# Patient Record
Sex: MALE | Race: WHITE | NOT HISPANIC OR LATINO | Employment: OTHER | URBAN - METROPOLITAN AREA
[De-identification: names, ages, dates, MRNs, and addresses within clinical notes are randomized per-mention and may not be internally consistent; named-entity substitution may affect disease eponyms.]

---

## 2023-03-24 DIAGNOSIS — E11.9 TYPE 2 DIABETES MELLITUS WITHOUT COMPLICATION, WITHOUT LONG-TERM CURRENT USE OF INSULIN (MULTI): Primary | ICD-10-CM

## 2023-03-24 RX ORDER — METFORMIN HYDROCHLORIDE 500 MG/1
TABLET, EXTENDED RELEASE ORAL
Qty: 360 TABLET | Refills: 3 | Status: SHIPPED | OUTPATIENT
Start: 2023-03-24 | End: 2023-12-18

## 2023-03-29 DIAGNOSIS — N18.31 STAGE 3A CHRONIC KIDNEY DISEASE (MULTI): Primary | ICD-10-CM

## 2023-04-05 DIAGNOSIS — E11.69 TYPE 2 DIABETES MELLITUS WITH OTHER SPECIFIED COMPLICATION, WITHOUT LONG-TERM CURRENT USE OF INSULIN (MULTI): Primary | ICD-10-CM

## 2023-04-06 RX ORDER — DULAGLUTIDE 0.75 MG/.5ML
INJECTION, SOLUTION SUBCUTANEOUS
Qty: 6 ML | Refills: 0 | Status: SHIPPED | OUTPATIENT
Start: 2023-04-06 | End: 2023-04-14 | Stop reason: ALTCHOICE

## 2023-04-10 DIAGNOSIS — N17.9 AKI (ACUTE KIDNEY INJURY) (CMS-HCC): Primary | ICD-10-CM

## 2023-04-10 DIAGNOSIS — E11.69 TYPE 2 DIABETES MELLITUS WITH OTHER SPECIFIED COMPLICATION, WITHOUT LONG-TERM CURRENT USE OF INSULIN (MULTI): ICD-10-CM

## 2023-04-14 RX ORDER — DULAGLUTIDE 1.5 MG/.5ML
1.5 INJECTION, SOLUTION SUBCUTANEOUS
Qty: 4 EACH | Refills: 2 | Status: SHIPPED | OUTPATIENT
Start: 2023-04-14 | End: 2023-06-26 | Stop reason: ALTCHOICE

## 2023-05-02 DIAGNOSIS — E78.5 DYSLIPIDEMIA: Primary | ICD-10-CM

## 2023-05-02 RX ORDER — PRAVASTATIN SODIUM 40 MG/1
40 TABLET ORAL DAILY
Qty: 90 TABLET | Refills: 0 | Status: SHIPPED | OUTPATIENT
Start: 2023-05-02 | End: 2023-07-26

## 2023-05-02 RX ORDER — LISINOPRIL 40 MG/1
40 TABLET ORAL DAILY
Qty: 90 TABLET | Refills: 0 | Status: SHIPPED | OUTPATIENT
Start: 2023-05-02 | End: 2023-11-06

## 2023-06-06 ENCOUNTER — OFFICE VISIT (OUTPATIENT)
Dept: PRIMARY CARE | Facility: CLINIC | Age: 73
End: 2023-06-06
Payer: MEDICARE

## 2023-06-06 VITALS
SYSTOLIC BLOOD PRESSURE: 164 MMHG | HEART RATE: 59 BPM | WEIGHT: 248 LBS | DIASTOLIC BLOOD PRESSURE: 71 MMHG | BODY MASS INDEX: 36.62 KG/M2

## 2023-06-06 DIAGNOSIS — I10 BENIGN ESSENTIAL HYPERTENSION: ICD-10-CM

## 2023-06-06 DIAGNOSIS — G62.89 OTHER POLYNEUROPATHY: ICD-10-CM

## 2023-06-06 DIAGNOSIS — E66.01 OBESITY, MORBID (MULTI): ICD-10-CM

## 2023-06-06 DIAGNOSIS — E78.1 HYPERTRIGLYCERIDEMIA: ICD-10-CM

## 2023-06-06 DIAGNOSIS — N18.30 CKD STAGE 3 SECONDARY TO DIABETES (MULTI): ICD-10-CM

## 2023-06-06 DIAGNOSIS — J42 CHRONIC BRONCHITIS, UNSPECIFIED CHRONIC BRONCHITIS TYPE (MULTI): Primary | ICD-10-CM

## 2023-06-06 DIAGNOSIS — J44.9 COPD, MILD (MULTI): ICD-10-CM

## 2023-06-06 DIAGNOSIS — E11.22 CKD STAGE 3 SECONDARY TO DIABETES (MULTI): ICD-10-CM

## 2023-06-06 DIAGNOSIS — E11.9 TYPE 2 DIABETES MELLITUS WITHOUT COMPLICATION, WITHOUT LONG-TERM CURRENT USE OF INSULIN (MULTI): ICD-10-CM

## 2023-06-06 PROBLEM — N28.1 RENAL CYST: Status: ACTIVE | Noted: 2023-06-06

## 2023-06-06 PROBLEM — H74.8X3 STIFF MIDDLE EAR TRANSMISSION OF BOTH EARS, TYPE A-S: Status: ACTIVE | Noted: 2023-06-06

## 2023-06-06 PROBLEM — K76.0 FATTY LIVER: Status: ACTIVE | Noted: 2023-06-06

## 2023-06-06 PROBLEM — G62.9 PERIPHERAL NEUROPATHY: Status: ACTIVE | Noted: 2023-06-06

## 2023-06-06 PROBLEM — N40.0 ENLARGED PROSTATE WITHOUT LOWER URINARY TRACT SYMPTOMS (LUTS): Status: ACTIVE | Noted: 2023-06-06

## 2023-06-06 PROBLEM — H40.013 OPEN ANGLE WITH BORDERLINE FINDINGS, LOW RISK, BILATERAL: Status: ACTIVE | Noted: 2023-06-06

## 2023-06-06 PROBLEM — N52.9 ED (ERECTILE DYSFUNCTION): Status: ACTIVE | Noted: 2023-06-06

## 2023-06-06 PROBLEM — E66.9 OBESITY: Status: ACTIVE | Noted: 2023-06-06

## 2023-06-06 PROBLEM — J30.9 ALLERGIC RHINITIS: Status: ACTIVE | Noted: 2023-06-06

## 2023-06-06 PROBLEM — I25.10 CORONARY ARTERY DISEASE: Status: ACTIVE | Noted: 2023-06-06

## 2023-06-06 PROBLEM — K21.9 GERD (GASTROESOPHAGEAL REFLUX DISEASE): Status: ACTIVE | Noted: 2023-06-06

## 2023-06-06 PROBLEM — E78.49 FAMILIAL COMBINED HYPERLIPIDEMIA: Status: ACTIVE | Noted: 2023-06-06

## 2023-06-06 PROBLEM — N35.919 URETHRAL MEATAL STENOSIS: Status: ACTIVE | Noted: 2023-06-06

## 2023-06-06 PROCEDURE — 1160F RVW MEDS BY RX/DR IN RCRD: CPT | Performed by: INTERNAL MEDICINE

## 2023-06-06 PROCEDURE — 3066F NEPHROPATHY DOC TX: CPT | Performed by: INTERNAL MEDICINE

## 2023-06-06 PROCEDURE — 3078F DIAST BP <80 MM HG: CPT | Performed by: INTERNAL MEDICINE

## 2023-06-06 PROCEDURE — 3077F SYST BP >= 140 MM HG: CPT | Performed by: INTERNAL MEDICINE

## 2023-06-06 PROCEDURE — 1159F MED LIST DOCD IN RCRD: CPT | Performed by: INTERNAL MEDICINE

## 2023-06-06 PROCEDURE — 1036F TOBACCO NON-USER: CPT | Performed by: INTERNAL MEDICINE

## 2023-06-06 PROCEDURE — 4010F ACE/ARB THERAPY RXD/TAKEN: CPT | Performed by: INTERNAL MEDICINE

## 2023-06-06 PROCEDURE — 99214 OFFICE O/P EST MOD 30 MIN: CPT | Performed by: INTERNAL MEDICINE

## 2023-06-06 RX ORDER — EZETIMIBE 10 MG/1
1 TABLET ORAL DAILY
COMMUNITY
Start: 2019-11-07 | End: 2023-07-26

## 2023-06-06 RX ORDER — MULTIVITAMIN/IRON/FOLIC ACID 18MG-0.4MG
1 TABLET ORAL DAILY
COMMUNITY

## 2023-06-06 RX ORDER — ALBUTEROL SULFATE 90 UG/1
1 AEROSOL, METERED RESPIRATORY (INHALATION) EVERY 4 HOURS PRN
COMMUNITY
Start: 2021-09-13 | End: 2023-08-23 | Stop reason: ALTCHOICE

## 2023-06-06 RX ORDER — AMLODIPINE BESYLATE 2.5 MG/1
1 TABLET ORAL DAILY
COMMUNITY
Start: 2022-07-20 | End: 2023-08-23 | Stop reason: ALTCHOICE

## 2023-06-06 RX ORDER — ASPIRIN 81 MG/1
1 TABLET ORAL DAILY
COMMUNITY
Start: 2019-05-03 | End: 2023-10-06 | Stop reason: ALTCHOICE

## 2023-06-06 RX ORDER — DULOXETIN HYDROCHLORIDE 60 MG/1
60 CAPSULE, DELAYED RELEASE ORAL
COMMUNITY
End: 2024-01-12

## 2023-06-06 RX ORDER — GLIMEPIRIDE 2 MG/1
1 TABLET ORAL 2 TIMES DAILY
COMMUNITY
Start: 2016-11-15 | End: 2023-07-26

## 2023-06-06 RX ORDER — TADALAFIL 5 MG/1
5 TABLET ORAL
COMMUNITY
End: 2023-08-23 | Stop reason: ALTCHOICE

## 2023-06-06 RX ORDER — FENOFIBRATE 160 MG/1
1 TABLET ORAL DAILY
COMMUNITY
Start: 2020-03-24 | End: 2023-12-04 | Stop reason: SDUPTHER

## 2023-06-06 RX ORDER — FLUTICASONE PROPIONATE 50 MCG
1 SPRAY, SUSPENSION (ML) NASAL DAILY
COMMUNITY
Start: 2021-09-13 | End: 2024-01-08 | Stop reason: WASHOUT

## 2023-06-06 RX ORDER — ATENOLOL 50 MG/1
1 TABLET ORAL DAILY
COMMUNITY
Start: 2017-08-09 | End: 2023-08-29

## 2023-06-06 RX ORDER — FLUTICASONE FUROATE AND VILANTEROL TRIFENATATE 100; 25 UG/1; UG/1
1 POWDER RESPIRATORY (INHALATION) DAILY
COMMUNITY
Start: 2022-02-04 | End: 2023-09-27

## 2023-06-06 NOTE — ASSESSMENT & PLAN NOTE
Possibly related to the diabetes however will rule out carpal tunnel syndrome with EMG.  We will call with results of testing

## 2023-06-06 NOTE — ASSESSMENT & PLAN NOTE
Previously well controlled.  Check A1c.  Adjust medications pending results.  Advised weight reduction and low carbohydrate diet.  Follow-up 4 months for reevaluation

## 2023-06-06 NOTE — ASSESSMENT & PLAN NOTE
Elevated today but previously well controlled.  We will recheck next visit.  Advised low-salt diet and regular exercise as well as weight reduction

## 2023-06-06 NOTE — PROGRESS NOTES
Subjective   Patient ID: Derrek Messina is a 73 y.o. male who presents for Follow-up.    HPI     Patient is a 72-year-old male with past medical history of obstructive sleep apnea diabetes mellitus type 2 hypertension dyslipidemia and asymptomatic coronary artery disease who presents for follow up    Patient with obstructive sleep apnea following with ENT.    Diabetes mellitus type 2. A1c better controlled. He is compliant with medications and takes his metformin and Trulicity as well as glimepiride. He denies increased thirst or increased urination.   No numbness or tingling of the extremities. A1c last < 6.5    Hypertension currently elevated.  His previous blood pressures have been better controlled.  He states has been taking his medications as prescribed.  No headaches changes in vision orthopnea    Dyslipidemia with elevated triglycerides. Due for LDL recheck and reevaluation of the triglyceride levels.    Asym CAD. Following with cards.     COPD. Not smoking. Occasional SOB on exertion but overall stable. No recent exacerbation He denies any wheezing. Most recent PFTs showing mild to moderate obstruction patient is currently taking Breo and albuterol.     CKD 3 with proteinuria. Pt on lisinopril 40mg.     B/L renal cysts with hx of nephrolithiasis. Following with nephro.  Recent ultrasound of the kidneys shows stability of the cysts    Patient is complaining of bilateral numbness and tingling of the fingers.  He states he was previously diagnosed with diabetic neuropathy.  It is not affecting his feet.  He is never had an EMG    Review of Systems  Constitutional: No fever or chills  Cardiovascular: no chest pain, no palpitations and no syncope.   Respiratory: no cough, no shortness of breath during exertion and no shortness of breath at rest.   Gastrointestinal: no abdominal pain, no nausea and no vomiting.  Neuro: No Headache, no dizziness    Objective   /71   Pulse 59   Wt 112 kg (248 lb)   BMI  36.62 kg/m²     Physical Exam  Constitutional: Alert and in no acute distress. Well developed, well nourished  Head and Face: Head and face: Normal.    Cardiovascular: Heart rate and rhythm were normal, normal S1 and S2. No peripheral edema.   Pulmonary: No respiratory distress. Clear bilateral breath sounds.  Musculoskeletal: Gait and station: Normal. Muscle strength/tone: Normal.   Skin: Normal skin color and pigmentation, normal skin turgor, and no rash.    Psychiatric: Judgment and insight: Intact. Mood and affect: Normal.        Lab Results   Component Value Date    WBC 7.1 04/19/2022    HGB 13.1 (L) 04/19/2022    HCT 42.1 04/19/2022     04/19/2022    CHOL 153 04/19/2022    TRIG 404 (H) 04/19/2022    HDL 39.4 (A) 04/19/2022    LDLDIRECT 44 09/08/2020    ALT 24 04/19/2022    AST 21 04/19/2022     04/19/2022    K 4.9 04/19/2022     04/19/2022    CREATININE 1.28 04/19/2022    BUN 19 04/19/2022    CO2 25 04/19/2022    TSH 1.72 06/10/2021    PSA 0.46 05/03/2019    HGBA1C 6.5 (A) 09/06/2022       US renal complete  Narrative: Interpreted By:  ENMA QUINONEZ MD  MRN: [id number]  Patient Name: JD SINHA     STUDY:  US RENAL BILAT;  5/1/2023 1:33 pm     INDICATION:  RHODA.     COMPARISON:  October 30, 2015 CT scan abdomen and pelvis, December 28, 2021 renal  ultrasound     ACCESSION NUMBER(S):  [id number]     ORDERING CLINICIAN:  BEVERLEY BOWLING     TECHNIQUE:  Multiple images of the kidneys were obtained  .     FINDINGS:  RIGHT KIDNEY:  The right kidney measures 11.3 cm in length. The renal cortical  echogenicity and thickness are within normal limits. No evident  hydronephrosis. Redemonstrated cysts measuring up to 7 cm mid  inferior aspect. Hyperechoic foci mid inferior aspect measuring 10 mm  and adjacent mid 6 mm hyperechoic focus potentially nonobstructing  renal calculi.     LEFT KIDNEY:  The left kidney measures 12 cm in length. The renal cortical  echogenicity and thickness are within normal  limits. Again seen is a  cortical cyst measuring up to 5 cm compared with 4 cm. No evident  calculi, or hydronephrosis.     Redemonstrated hyperechoic liver most likely fatty infiltration,  poorly evaluated.     BLADDER:  Not assessed     Impression: Bilateral cortical cyst appears similar.     Potential nonobstructing calculi right kidney.     No separate acute detected abnormality.     Hyperechoic liver favoring fatty infiltration.            Assessment/Plan   Problem List Items Addressed This Visit          Nervous    Peripheral neuropathy     Possibly related to the diabetes however will rule out carpal tunnel syndrome with EMG.  We will call with results of testing         Relevant Orders    EMG & nerve conduction       Respiratory    Chronic bronchitis, unspecified chronic bronchitis type (CMS/HCC) - Primary     Continue Breo daily and continue albuterol as needed.  No recent exacerbations         Relevant Orders    Renal function panel    Urinalysis with Reflex Microscopic    Lipid Panel    Hemoglobin A1C    Albumin , Urine Random    CBC    COPD, mild (CMS/HCC)       Circulatory    Benign essential hypertension     Elevated today but previously well controlled.  We will recheck next visit.  Advised low-salt diet and regular exercise as well as weight reduction            Endocrine/Metabolic    CKD stage 3 secondary to diabetes (CMS/HCC)     We will continue to monitor.  Check renal function as well as urine albumin.  Patient does not wish to start an SGLT2.  Advise follow-up with nephrology         Relevant Orders    Renal function panel    Urinalysis with Reflex Microscopic    Lipid Panel    Hemoglobin A1C    Albumin , Urine Random    CBC    Obesity    Diabetes mellitus (CMS/HCC)     Previously well controlled.  Check A1c.  Adjust medications pending results.  Advised weight reduction and low carbohydrate diet.  Follow-up 4 months for reevaluation         Relevant Orders    Renal function panel    Urinalysis  with Reflex Microscopic    Lipid Panel    Hemoglobin A1C    Albumin , Urine Random    CBC       Other    Hypertriglyceridemia     Needs better control the setting of asymptomatic coronary artery disease.  Patient follows with cardiology.  Continue omega-3's.  Continue fenofibrate and Zetia.  Continue statin.  Advise low cholesterol diet

## 2023-06-06 NOTE — ASSESSMENT & PLAN NOTE
We will continue to monitor.  Check renal function as well as urine albumin.  Patient does not wish to start an SGLT2.  Advise follow-up with nephrology

## 2023-06-06 NOTE — ASSESSMENT & PLAN NOTE
Needs better control the setting of asymptomatic coronary artery disease.  Patient follows with cardiology.  Continue omega-3's.  Continue fenofibrate and Zetia.  Continue statin.  Advise low cholesterol diet

## 2023-06-15 DIAGNOSIS — G62.89 OTHER POLYNEUROPATHY: ICD-10-CM

## 2023-06-16 ENCOUNTER — TELEPHONE (OUTPATIENT)
Dept: PRIMARY CARE | Facility: CLINIC | Age: 73
End: 2023-06-16

## 2023-06-20 LAB
ALBUMIN (G/DL) IN SER/PLAS: 4.3 G/DL (ref 3.4–5)
ALBUMIN (MG/L) IN URINE: 535.8 MG/L
ALBUMIN/CREATININE (UG/MG) IN URINE: 461.9 UG/MG CRT (ref 0–30)
ANION GAP IN SER/PLAS: 16 MMOL/L (ref 10–20)
APPEARANCE, URINE: CLEAR
BACTERIA, URINE: ABNORMAL /HPF
BILIRUBIN, URINE: NEGATIVE
BLOOD, URINE: NEGATIVE
CALCIUM (MG/DL) IN SER/PLAS: 9.7 MG/DL (ref 8.6–10.6)
CARBON DIOXIDE, TOTAL (MMOL/L) IN SER/PLAS: 24 MMOL/L (ref 21–32)
CHLORIDE (MMOL/L) IN SER/PLAS: 104 MMOL/L (ref 98–107)
CHOLESTEROL (MG/DL) IN SER/PLAS: 135 MG/DL (ref 0–199)
CHOLESTEROL IN HDL (MG/DL) IN SER/PLAS: 35.1 MG/DL
CHOLESTEROL/HDL RATIO: 3.8
COLOR, URINE: YELLOW
CREATININE (MG/DL) IN SER/PLAS: 2.1 MG/DL (ref 0.5–1.3)
CREATININE (MG/DL) IN URINE: 116 MG/DL (ref 20–370)
ERYTHROCYTE DISTRIBUTION WIDTH (RATIO) BY AUTOMATED COUNT: 14.1 % (ref 11.5–14.5)
ERYTHROCYTE MEAN CORPUSCULAR HEMOGLOBIN CONCENTRATION (G/DL) BY AUTOMATED: 31.5 G/DL (ref 32–36)
ERYTHROCYTE MEAN CORPUSCULAR VOLUME (FL) BY AUTOMATED COUNT: 93 FL (ref 80–100)
ERYTHROCYTES (10*6/UL) IN BLOOD BY AUTOMATED COUNT: 5.21 X10E12/L (ref 4.5–5.9)
ESTIMATED AVERAGE GLUCOSE FOR HBA1C: 192 MG/DL
GFR MALE: 33 ML/MIN/1.73M2
GLUCOSE (MG/DL) IN SER/PLAS: 125 MG/DL (ref 74–99)
GLUCOSE, URINE: NEGATIVE MG/DL
HEMATOCRIT (%) IN BLOOD BY AUTOMATED COUNT: 48.2 % (ref 41–52)
HEMOGLOBIN (G/DL) IN BLOOD: 15.2 G/DL (ref 13.5–17.5)
HEMOGLOBIN A1C/HEMOGLOBIN TOTAL IN BLOOD: 8.3 %
KETONES, URINE: NEGATIVE MG/DL
LDL: ABNORMAL MG/DL (ref 0–99)
LEUKOCYTE ESTERASE, URINE: NEGATIVE
LEUKOCYTES (10*3/UL) IN BLOOD BY AUTOMATED COUNT: 7.6 X10E9/L (ref 4.4–11.3)
NITRITE, URINE: NEGATIVE
NRBC (PER 100 WBCS) BY AUTOMATED COUNT: 0 /100 WBC (ref 0–0)
PH, URINE: 5 (ref 5–8)
PHOSPHATE (MG/DL) IN SER/PLAS: 3.5 MG/DL (ref 2.5–4.9)
PLATELETS (10*3/UL) IN BLOOD AUTOMATED COUNT: 203 X10E9/L (ref 150–450)
POTASSIUM (MMOL/L) IN SER/PLAS: 5.7 MMOL/L (ref 3.5–5.3)
PROTEIN, URINE: ABNORMAL MG/DL
RBC, URINE: 3 /HPF (ref 0–5)
SODIUM (MMOL/L) IN SER/PLAS: 138 MMOL/L (ref 136–145)
SPECIFIC GRAVITY, URINE: 1.01 (ref 1–1.03)
SQUAMOUS EPITHELIAL CELLS, URINE: <1 /HPF
TRIGLYCERIDE (MG/DL) IN SER/PLAS: 532 MG/DL (ref 0–149)
UREA NITROGEN (MG/DL) IN SER/PLAS: 29 MG/DL (ref 6–23)
UROBILINOGEN, URINE: <2 MG/DL (ref 0–1.9)
VLDL: ABNORMAL MG/DL (ref 0–40)
WBC, URINE: 3 /HPF (ref 0–5)

## 2023-06-20 PROCEDURE — 80061 LIPID PANEL: CPT

## 2023-06-20 PROCEDURE — 85027 COMPLETE CBC AUTOMATED: CPT

## 2023-06-20 PROCEDURE — 81001 URINALYSIS AUTO W/SCOPE: CPT

## 2023-06-20 PROCEDURE — 82570 ASSAY OF URINE CREATININE: CPT

## 2023-06-20 PROCEDURE — 82043 UR ALBUMIN QUANTITATIVE: CPT

## 2023-06-20 PROCEDURE — 80069 RENAL FUNCTION PANEL: CPT

## 2023-06-20 PROCEDURE — 83036 HEMOGLOBIN GLYCOSYLATED A1C: CPT

## 2023-06-26 DIAGNOSIS — E11.69 TYPE 2 DIABETES MELLITUS WITH OTHER SPECIFIED COMPLICATION, WITHOUT LONG-TERM CURRENT USE OF INSULIN (MULTI): ICD-10-CM

## 2023-06-26 DIAGNOSIS — E11.9 TYPE 2 DIABETES MELLITUS WITHOUT COMPLICATION, WITHOUT LONG-TERM CURRENT USE OF INSULIN (MULTI): ICD-10-CM

## 2023-07-06 ENCOUNTER — TELEPHONE (OUTPATIENT)
Dept: PRIMARY CARE | Facility: CLINIC | Age: 73
End: 2023-07-06
Payer: MEDICARE

## 2023-07-06 DIAGNOSIS — E11.9 TYPE 2 DIABETES MELLITUS WITHOUT COMPLICATION, WITHOUT LONG-TERM CURRENT USE OF INSULIN (MULTI): Primary | ICD-10-CM

## 2023-07-08 DIAGNOSIS — E78.5 DYSLIPIDEMIA: ICD-10-CM

## 2023-07-08 DIAGNOSIS — E87.5 HYPERKALEMIA: Primary | ICD-10-CM

## 2023-07-10 RX ORDER — LISINOPRIL 40 MG/1
40 TABLET ORAL DAILY
Qty: 90 TABLET | Refills: 0 | OUTPATIENT
Start: 2023-07-10

## 2023-07-26 DIAGNOSIS — E78.5 DYSLIPIDEMIA: ICD-10-CM

## 2023-07-26 DIAGNOSIS — E11.9 TYPE 2 DIABETES MELLITUS WITHOUT COMPLICATION, WITHOUT LONG-TERM CURRENT USE OF INSULIN (MULTI): Primary | ICD-10-CM

## 2023-07-26 RX ORDER — PRAVASTATIN SODIUM 40 MG/1
40 TABLET ORAL DAILY
Qty: 90 TABLET | Refills: 3 | Status: SHIPPED | OUTPATIENT
Start: 2023-07-26

## 2023-07-26 RX ORDER — GLIMEPIRIDE 2 MG/1
2 TABLET ORAL 2 TIMES DAILY
Qty: 200 TABLET | Refills: 2 | Status: SHIPPED | OUTPATIENT
Start: 2023-07-26

## 2023-07-26 RX ORDER — EZETIMIBE 10 MG/1
10 TABLET ORAL DAILY
Qty: 90 TABLET | Refills: 3 | Status: SHIPPED | OUTPATIENT
Start: 2023-07-26 | End: 2024-04-17

## 2023-08-22 ENCOUNTER — CLINICAL SUPPORT (OUTPATIENT)
Dept: PRIMARY CARE | Facility: CLINIC | Age: 73
End: 2023-08-22
Payer: MEDICARE

## 2023-08-22 DIAGNOSIS — E78.5 DYSLIPIDEMIA: ICD-10-CM

## 2023-08-22 DIAGNOSIS — E87.5 HYPERKALEMIA: ICD-10-CM

## 2023-08-22 DIAGNOSIS — E11.22 CKD STAGE 3 SECONDARY TO DIABETES (MULTI): Primary | ICD-10-CM

## 2023-08-22 DIAGNOSIS — N18.30 CKD STAGE 3 SECONDARY TO DIABETES (MULTI): Primary | ICD-10-CM

## 2023-08-22 LAB
ALBUMIN (G/DL) IN SER/PLAS: 4.2 G/DL (ref 3.4–5)
ANION GAP IN SER/PLAS: 17 MMOL/L (ref 10–20)
CALCIUM (MG/DL) IN SER/PLAS: 9.7 MG/DL (ref 8.6–10.6)
CARBON DIOXIDE, TOTAL (MMOL/L) IN SER/PLAS: 21 MMOL/L (ref 21–32)
CHLORIDE (MMOL/L) IN SER/PLAS: 107 MMOL/L (ref 98–107)
CREATININE (MG/DL) IN SER/PLAS: 2.16 MG/DL (ref 0.5–1.3)
GFR MALE: 31 ML/MIN/1.73M2
GLUCOSE (MG/DL) IN SER/PLAS: 212 MG/DL (ref 74–99)
PHOSPHATE (MG/DL) IN SER/PLAS: 2.9 MG/DL (ref 2.5–4.9)
POTASSIUM (MMOL/L) IN SER/PLAS: 5.8 MMOL/L (ref 3.5–5.3)
SODIUM (MMOL/L) IN SER/PLAS: 139 MMOL/L (ref 136–145)
UREA NITROGEN (MG/DL) IN SER/PLAS: 37 MG/DL (ref 6–23)

## 2023-08-22 PROCEDURE — 80069 RENAL FUNCTION PANEL: CPT

## 2023-08-26 PROBLEM — R11.10 REGURGITATION OF FOOD: Status: ACTIVE | Noted: 2023-08-26

## 2023-08-26 PROBLEM — R06.09 DYSPNEA ON EXERTION: Status: ACTIVE | Noted: 2023-08-26

## 2023-08-26 PROBLEM — Z86.79 HISTORY OF HYPERTENSION: Status: ACTIVE | Noted: 2023-08-26

## 2023-08-26 PROBLEM — H25.813 COMBINED FORM OF SENILE CATARACT OF BOTH EYES: Status: ACTIVE | Noted: 2023-08-26

## 2023-08-26 PROBLEM — R20.2 PARESTHESIA: Status: ACTIVE | Noted: 2023-08-26

## 2023-08-26 PROBLEM — Z86.2 HISTORY OF IMMUNE DISORDER: Status: ACTIVE | Noted: 2023-08-26

## 2023-08-26 PROBLEM — L30.4 INTERTRIGO: Status: ACTIVE | Noted: 2023-08-26

## 2023-08-26 PROBLEM — R19.8 ALTERED BOWEL FUNCTION: Status: ACTIVE | Noted: 2023-08-26

## 2023-08-26 PROBLEM — R68.82 REDUCED LIBIDO: Status: ACTIVE | Noted: 2023-08-26

## 2023-08-26 PROBLEM — R53.81 PHYSICAL DECONDITIONING: Status: ACTIVE | Noted: 2023-08-26

## 2023-08-26 PROBLEM — M54.9 BACK PAIN: Status: ACTIVE | Noted: 2023-08-26

## 2023-08-26 PROBLEM — E11.9 ABNORMAL METABOLIC STATE DUE TO DIABETES MELLITUS (MULTI): Status: ACTIVE | Noted: 2023-08-26

## 2023-08-26 PROBLEM — H40.1390 PIGMENTARY GLAUCOMA: Status: ACTIVE | Noted: 2023-08-26

## 2023-08-26 PROBLEM — H90.3 SENSORINEURAL HEARING LOSS, BILATERAL: Status: ACTIVE | Noted: 2017-08-18

## 2023-08-26 PROBLEM — N17.9 ACUTE KIDNEY FAILURE (CMS-HCC): Status: ACTIVE | Noted: 2023-05-01

## 2023-08-26 PROBLEM — L73.9 FOLLICULITIS: Status: ACTIVE | Noted: 2023-08-26

## 2023-08-26 PROBLEM — L98.9 SKIN LESION: Status: ACTIVE | Noted: 2023-08-26

## 2023-08-26 PROBLEM — R13.10 DYSPHAGIA: Status: ACTIVE | Noted: 2023-08-26

## 2023-08-26 PROBLEM — R05.9 COUGH: Status: ACTIVE | Noted: 2023-08-26

## 2023-08-26 PROBLEM — H52.7 UNSPECIFIED DISORDER OF REFRACTION: Status: ACTIVE | Noted: 2023-08-26

## 2023-08-26 PROBLEM — H93.13 TINNITUS OF BOTH EARS: Status: ACTIVE | Noted: 2017-09-17

## 2023-08-26 PROBLEM — L29.9 PRURITUS: Status: ACTIVE | Noted: 2023-08-26

## 2023-08-26 PROBLEM — M43.10 SPONDYLOLISTHESIS: Status: ACTIVE | Noted: 2023-08-26

## 2023-08-29 DIAGNOSIS — I10 BENIGN ESSENTIAL HYPERTENSION: ICD-10-CM

## 2023-08-29 RX ORDER — ATENOLOL 50 MG/1
50 TABLET ORAL DAILY
Qty: 100 TABLET | Refills: 0 | Status: SHIPPED | OUTPATIENT
Start: 2023-08-29 | End: 2023-12-04

## 2023-09-26 DIAGNOSIS — J44.9 COPD, MILD (MULTI): ICD-10-CM

## 2023-09-27 ENCOUNTER — PATIENT MESSAGE (OUTPATIENT)
Dept: PRIMARY CARE | Facility: CLINIC | Age: 73
End: 2023-09-27
Payer: MEDICARE

## 2023-09-27 DIAGNOSIS — J44.9 COPD, MILD (MULTI): Primary | ICD-10-CM

## 2023-09-27 RX ORDER — FLUTICASONE FUROATE AND VILANTEROL TRIFENATATE 100; 25 UG/1; UG/1
POWDER RESPIRATORY (INHALATION)
Qty: 180 EACH | Refills: 0 | Status: SHIPPED | OUTPATIENT
Start: 2023-09-27 | End: 2023-10-06 | Stop reason: SDUPTHER

## 2023-10-05 ENCOUNTER — OFFICE VISIT (OUTPATIENT)
Dept: PRIMARY CARE | Facility: CLINIC | Age: 73
End: 2023-10-05
Payer: MEDICARE

## 2023-10-05 VITALS
DIASTOLIC BLOOD PRESSURE: 68 MMHG | BODY MASS INDEX: 36.48 KG/M2 | SYSTOLIC BLOOD PRESSURE: 122 MMHG | HEART RATE: 73 BPM | WEIGHT: 247 LBS

## 2023-10-05 DIAGNOSIS — Z23 NEEDS FLU SHOT: ICD-10-CM

## 2023-10-05 DIAGNOSIS — Z13.6 ENCOUNTER FOR ABDOMINAL AORTIC ANEURYSM (AAA) SCREENING: ICD-10-CM

## 2023-10-05 DIAGNOSIS — E11.22 CKD STAGE 3 SECONDARY TO DIABETES (MULTI): ICD-10-CM

## 2023-10-05 DIAGNOSIS — R05.3 CHRONIC COUGH: ICD-10-CM

## 2023-10-05 DIAGNOSIS — E11.9 TYPE 2 DIABETES MELLITUS WITHOUT COMPLICATION, WITHOUT LONG-TERM CURRENT USE OF INSULIN (MULTI): ICD-10-CM

## 2023-10-05 DIAGNOSIS — N18.4 CHRONIC RENAL DISEASE, STAGE IV (MULTI): Primary | ICD-10-CM

## 2023-10-05 DIAGNOSIS — I10 BENIGN ESSENTIAL HYPERTENSION: ICD-10-CM

## 2023-10-05 DIAGNOSIS — R93.1 AGATSTON CORONARY ARTERY CALCIUM SCORE GREATER THAN 400: ICD-10-CM

## 2023-10-05 DIAGNOSIS — N18.30 CKD STAGE 3 SECONDARY TO DIABETES (MULTI): ICD-10-CM

## 2023-10-05 PROBLEM — J30.9 ALLERGIC RHINITIS: Status: RESOLVED | Noted: 2023-06-06 | Resolved: 2023-10-05

## 2023-10-05 PROBLEM — H74.8X3 STIFF MIDDLE EAR TRANSMISSION OF BOTH EARS, TYPE A-S: Status: RESOLVED | Noted: 2023-06-06 | Resolved: 2023-10-05

## 2023-10-05 PROBLEM — E78.1 HYPERTRIGLYCERIDEMIA: Status: RESOLVED | Noted: 2023-06-06 | Resolved: 2023-10-05

## 2023-10-05 PROBLEM — Z86.79 HISTORY OF HYPERTENSION: Status: RESOLVED | Noted: 2023-08-26 | Resolved: 2023-10-05

## 2023-10-05 PROBLEM — R06.02 EXERTIONAL SHORTNESS OF BREATH: Status: ACTIVE | Noted: 2023-08-26

## 2023-10-05 PROBLEM — N20.0 KIDNEY STONE: Status: ACTIVE | Noted: 2023-06-06

## 2023-10-05 PROBLEM — R19.4 CHANGE IN BOWEL HABITS: Status: ACTIVE | Noted: 2023-08-26

## 2023-10-05 LAB
ALBUMIN SERPL BCP-MCNC: 4.3 G/DL (ref 3.4–5)
ANION GAP SERPL CALC-SCNC: 16 MMOL/L (ref 10–20)
BUN SERPL-MCNC: 37 MG/DL (ref 6–23)
CALCIUM SERPL-MCNC: 9.7 MG/DL (ref 8.6–10.6)
CHLORIDE SERPL-SCNC: 109 MMOL/L (ref 98–107)
CO2 SERPL-SCNC: 21 MMOL/L (ref 21–32)
CREAT SERPL-MCNC: 2.09 MG/DL (ref 0.5–1.3)
EST. AVERAGE GLUCOSE BLD GHB EST-MCNC: 183 MG/DL
GFR SERPL CREATININE-BSD FRML MDRD: 33 ML/MIN/1.73M*2
GLUCOSE SERPL-MCNC: 197 MG/DL (ref 74–99)
HBA1C MFR BLD: 8 %
PHOSPHATE SERPL-MCNC: 3 MG/DL (ref 2.5–4.9)
POTASSIUM SERPL-SCNC: 5.8 MMOL/L (ref 3.5–5.3)
SODIUM SERPL-SCNC: 140 MMOL/L (ref 136–145)

## 2023-10-05 PROCEDURE — 83036 HEMOGLOBIN GLYCOSYLATED A1C: CPT

## 2023-10-05 PROCEDURE — 4010F ACE/ARB THERAPY RXD/TAKEN: CPT | Performed by: INTERNAL MEDICINE

## 2023-10-05 PROCEDURE — 80069 RENAL FUNCTION PANEL: CPT

## 2023-10-05 PROCEDURE — 1159F MED LIST DOCD IN RCRD: CPT | Performed by: INTERNAL MEDICINE

## 2023-10-05 PROCEDURE — 3078F DIAST BP <80 MM HG: CPT | Performed by: INTERNAL MEDICINE

## 2023-10-05 PROCEDURE — G0008 ADMIN INFLUENZA VIRUS VAC: HCPCS | Performed by: INTERNAL MEDICINE

## 2023-10-05 PROCEDURE — 99214 OFFICE O/P EST MOD 30 MIN: CPT | Performed by: INTERNAL MEDICINE

## 2023-10-05 PROCEDURE — 90662 IIV NO PRSV INCREASED AG IM: CPT | Performed by: INTERNAL MEDICINE

## 2023-10-05 PROCEDURE — 3066F NEPHROPATHY DOC TX: CPT | Performed by: INTERNAL MEDICINE

## 2023-10-05 PROCEDURE — 1160F RVW MEDS BY RX/DR IN RCRD: CPT | Performed by: INTERNAL MEDICINE

## 2023-10-05 PROCEDURE — 36415 COLL VENOUS BLD VENIPUNCTURE: CPT

## 2023-10-05 PROCEDURE — 3052F HG A1C>EQUAL 8.0%<EQUAL 9.0%: CPT | Performed by: INTERNAL MEDICINE

## 2023-10-05 PROCEDURE — 1036F TOBACCO NON-USER: CPT | Performed by: INTERNAL MEDICINE

## 2023-10-05 PROCEDURE — 3074F SYST BP LT 130 MM HG: CPT | Performed by: INTERNAL MEDICINE

## 2023-10-05 RX ORDER — MONTELUKAST SODIUM 10 MG/1
10 TABLET ORAL NIGHTLY
Qty: 30 TABLET | Refills: 0 | Status: SHIPPED | OUTPATIENT
Start: 2023-10-05 | End: 2023-11-06

## 2023-10-05 NOTE — ASSESSMENT & PLAN NOTE
Avoid ibuprofen.  Would benefit from medication such as an SGLT2 however there appears to be a cost issue related to the medication and he has Medicare gap.  Will refer to the pharmacist to discuss whether this medication can be affordable.  In the meantime continue ACE inhibitor 40 mg.

## 2023-10-05 NOTE — PROGRESS NOTES
Subjective   Patient ID: Derrek Messina is a 73 y.o. male who presents for Follow-up.    HPI     Patient is a 72-year-old male with past medical history of obstructive sleep apnea diabetes mellitus type 2 hypertension dyslipidemia and asymptomatic coronary artery disease who presents for follow up    Patient with obstructive sleep apnea following with ENT.    Diabetes mellitus type 2. [id number] last visit . He is compliant with medications and takes his metformin and Trulicity as well as glimepiride. He denies increased thirst or increased urination.   No numbness or tingling of the extremities.     Hypertension.  Currently well controlled on current medications denies headache changes in vision orthopnea.  Reports compliance with his medications.    Dyslipidemia with elevated triglycerides. Due for LDL recheck and reevaluation of the triglyceride levels.    Asym CAD. Following with cards.     COPD. Not smoking. Occasional SOB on exertion but overall stable. No recent exacerbation He denies any wheezing. Most recent PFTs showing mild to moderate obstruction patient is currently taking Breo and albuterol.     CKD 3 with proteinuria. Pt on lisinopril 40mg.     B/L renal cysts with hx of nephrolithiasis. Following with nephro.  Recent ultrasound of the kidneys shows stability of the cysts      Review of Systems  Constitutional: No fever or chills  Cardiovascular: no chest pain, no palpitations and no syncope.   Respiratory: no cough, no shortness of breath during exertion and no shortness of breath at rest.   Gastrointestinal: no abdominal pain, no nausea and no vomiting.  Neuro: No Headache, no dizziness    Objective   /68   Pulse 73   Wt 112 kg (247 lb)   BMI 36.48 kg/m²     Physical Exam  Constitutional: Alert and in no acute distress. Well developed, well nourished  Head and Face: Head and face: Normal.    Cardiovascular: Heart rate and rhythm were normal, normal S1 and S2. No peripheral edema.    Pulmonary: No respiratory distress. Clear bilateral breath sounds.  Musculoskeletal: Gait and station: Normal. Muscle strength/tone: Normal.   Skin: Normal skin color and pigmentation, normal skin turgor, and no rash.    Psychiatric: Judgment and insight: Intact. Mood and affect: Normal.        Lab Results   Component Value Date    WBC 7.6 06/20/2023    HGB 15.2 06/20/2023    HCT 48.2 06/20/2023     06/20/2023    CHOL 135 06/20/2023    TRIG 532 (H) 06/20/2023    HDL 35.1 (A) 06/20/2023    LDLDIRECT 47 (L) 02/25/2023    ALT 21 02/25/2023    AST 20 02/25/2023     08/22/2023    K 5.8 (H) 08/22/2023     08/22/2023    CREATININE 2.16 (H) 08/22/2023    BUN 37 (H) 08/22/2023    CO2 21 08/22/2023    TSH 1.72 06/10/2021    PSA 0.7 02/25/2023    HGBA1C 8.3 (A) 06/20/2023    ALBUR 259 (H) 02/25/2023       US renal complete  Narrative: Interpreted By:  ENMA QUINONEZ MD  MRN: [id number]  Patient Name: JD SINHA     STUDY:  US RENAL BILAT;  5/1/2023 1:33 pm     INDICATION:  RHODA.     COMPARISON:  October 30, 2015 CT scan abdomen and pelvis, December 28, 2021 renal  ultrasound     ACCESSION NUMBER(S):  [id number]     ORDERING CLINICIAN:  BEVERLEY BOWLING     TECHNIQUE:  Multiple images of the kidneys were obtained  .     FINDINGS:  RIGHT KIDNEY:  The right kidney measures 11.3 cm in length. The renal cortical  echogenicity and thickness are within normal limits. No evident  hydronephrosis. Redemonstrated cysts measuring up to 7 cm mid  inferior aspect. Hyperechoic foci mid inferior aspect measuring 10 mm  and adjacent mid 6 mm hyperechoic focus potentially nonobstructing  renal calculi.     LEFT KIDNEY:  The left kidney measures 12 cm in length. The renal cortical  echogenicity and thickness are within normal limits. Again seen is a  cortical cyst measuring up to 5 cm compared with 4 cm. No evident  calculi, or hydronephrosis.     Redemonstrated hyperechoic liver most likely fatty infiltration,  poorly  evaluated.     BLADDER:  Not assessed     Impression: Bilateral cortical cyst appears similar.     Potential nonobstructing calculi right kidney.     No separate acute detected abnormality.     Hyperechoic liver favoring fatty infiltration.            Assessment/Plan   Problem List Items Addressed This Visit       Chronic renal disease, stage IV (CMS/HCC) - Primary

## 2023-10-05 NOTE — ASSESSMENT & PLAN NOTE
Last visit A1c was elevated at 8.8.  Due for recheck.  Continue Trulicity and other medications.  Can consider increasing the Trulicity to 4.5 if no improvement in symptoms.  Advised low carbohydrate diet and weight reduction.

## 2023-10-06 ENCOUNTER — TELEMEDICINE (OUTPATIENT)
Dept: PHARMACY | Facility: HOSPITAL | Age: 73
End: 2023-10-06
Payer: MEDICARE

## 2023-10-06 ENCOUNTER — PHARMACY VISIT (OUTPATIENT)
Dept: PHARMACY | Facility: CLINIC | Age: 73
End: 2023-10-06
Payer: COMMERCIAL

## 2023-10-06 DIAGNOSIS — E11.22 CKD STAGE 3 SECONDARY TO DIABETES (MULTI): ICD-10-CM

## 2023-10-06 DIAGNOSIS — N18.30 CKD STAGE 3 SECONDARY TO DIABETES (MULTI): ICD-10-CM

## 2023-10-06 DIAGNOSIS — N18.4 CHRONIC RENAL DISEASE, STAGE IV (MULTI): ICD-10-CM

## 2023-10-06 DIAGNOSIS — E11.9 TYPE 2 DIABETES MELLITUS WITHOUT COMPLICATION, WITHOUT LONG-TERM CURRENT USE OF INSULIN (MULTI): Primary | ICD-10-CM

## 2023-10-06 DIAGNOSIS — J44.9 COPD, MILD (MULTI): ICD-10-CM

## 2023-10-06 RX ORDER — DULAGLUTIDE 4.5 MG/.5ML
4.5 INJECTION, SOLUTION SUBCUTANEOUS
Qty: 6 ML | Refills: 1 | Status: SHIPPED | OUTPATIENT
Start: 2023-10-06 | End: 2024-04-09 | Stop reason: ALTCHOICE

## 2023-10-06 RX ORDER — FLUTICASONE FUROATE AND VILANTEROL 100; 25 UG/1; UG/1
1 POWDER RESPIRATORY (INHALATION) DAILY
Qty: 180 EACH | Refills: 1 | Status: SHIPPED | OUTPATIENT
Start: 2023-10-06 | End: 2024-01-08 | Stop reason: ALTCHOICE

## 2023-10-06 ASSESSMENT — ENCOUNTER SYMPTOMS: POLYPHAGIA: 1

## 2023-10-06 NOTE — ASSESSMENT & PLAN NOTE
Stable, little shortness of breath  Received influenza vaccine at office on 10/5/23  Received PPSV23 and PCV13 in the past  Continue: Breo 100-25 mcg/actuation inhaler.

## 2023-10-06 NOTE — PATIENT INSTRUCTIONS
Thank you for speaking with me today, Javier. We will get documents together to submit an application for the  patient assistance program to bring your out-of-pocket costs for Trulicity, Breo, and Jardiance down to $0. With the increased dose of Trulicity and addition of Jardiance, hopefully we can go down on glimepiride and eventually discontinue it. Feel free to call me at [phone] with any questions or concerns. You can leave a message with your question or issue as it is my direct number and I will get back to you ASAP.

## 2023-10-06 NOTE — PROGRESS NOTES
Subjective   Patient ID: Derrek Messina is a 73 y.o. male who presents for Diabetes.    Referring Provider: Jeremías Encinas,      Diabetes  He presents for his initial diabetic visit. He has type 2 diabetes mellitus. There are no hypoglycemic associated symptoms. Associated symptoms include polyphagia.     Breakfast: Multigrain bread or multiwheat English muffin or brown rice a 2-3 times a week; oranges  Dinner: pasta, red meat occasionally, chicken, peas, carrots  Drinks: Water, Diet Pepsi, 2% milk    Javier was using a glucometer to check his blood sugars, but is in the process of changing to a Dexcom. He has not checked his blood sugar in a few weeks. Fasting blood sugar was 138 mg/dL a few weeks ago.     Patient Assistance Screening (VAF)    Patient verbally reports monthly or yearly income which is less than 400% federal poverty level    Application for program has been submitted for the following medications: Jardiance 25 mg, Trulicity 4.5 mg, Breo 100-25 mcg/dose inhaler    Patient has been informed that program team will be reaching out to them to discuss necessary documentation, instructed to answer phone/return voicemail.     Patient aware this process may take up to 6 weeks.     If approved medication must be filled through [facility] pharmacy and may be picked up or mailed to patient.      Objective     There were no vitals taken for this visit.     Labs  Lab Results   Component Value Date    BILITOT 0.3 02/25/2023    CALCIUM 9.7 10/05/2023    CO2 21 10/05/2023     (H) 10/05/2023    CREATININE 2.09 (H) 10/05/2023    GLUCOSE 197 (H) 10/05/2023    ALKPHOS 45 02/25/2023    K 5.8 (H) 10/05/2023    PROT 7.6 02/25/2023     10/05/2023    AST 20 02/25/2023    ALT 21 02/25/2023    BUN 37 (H) 10/05/2023    ANIONGAP 16 10/05/2023    PHOS 3.0 10/05/2023    ALBUMIN 4.3 10/05/2023    GFRMALE 31 (A) 08/22/2023     Lab Results   Component Value Date    TRIG 532 (H) 06/20/2023    CHOL 135 06/20/2023     LDLCALC  02/25/2023     Unable to report calculated LDL due to increased triglycerides. Direct    HDL 35.1 (A) 06/20/2023     Lab Results   Component Value Date    HGBA1C 8.0 (H) 10/05/2023       Current Outpatient Medications on File Prior to Visit   Medication Sig Dispense Refill    atenolol (Tenormin) 50 mg tablet Take 1 tablet (50 mg) by mouth once daily. 100 tablet 0    Breo Ellipta 100-25 mcg/dose inhaler USE 1 INHALATION BY MOUTH  ONCE DAILY AT THE SAME TIME EACH DAY . MAX ONCE IN 24  HOURS. RINSE MOUTH WITH  WATER AND SPIT AFTER USE. 180 each 0    dulaglutide 3 mg/0.5 mL pen injector Inject 3 mg under the skin 1 (one) time per week. 6 mL 1    DULoxetine (Cymbalta) 60 mg DR capsule Take 1 capsule (60 mg) by mouth once daily.      ezetimibe (Zetia) 10 mg tablet TAKE 1 TABLET BY MOUTH  DAILY 90 tablet 3    fenofibrate (Triglide) 160 mg tablet Take 1 tablet (160 mg) by mouth once daily.      fluticasone (Flonase) 50 mcg/actuation nasal spray Administer 1 spray into each nostril once daily.      glimepiride (Amaryl) 2 mg tablet TAKE 1 TABLET BY MOUTH  TWICE DAILY 200 tablet 2    lisinopril 40 mg tablet Take 1 tablet (40 mg) by mouth once daily. 90 tablet 0    metFORMIN XR (Glucophage-XR) 500 mg 24 hr tablet TAKE 2 TABLETS BY MOUTH  TWICE DAILY 360 tablet 3    montelukast (Singulair) 10 mg tablet Take 1 tablet (10 mg) by mouth once daily at bedtime. 30 tablet 0    multivitamin-iron-folic acid (Centrum Complete)  mg-mcg tablet tablet Take 1 tablet by mouth once daily.      pravastatin (Pravachol) 40 mg tablet TAKE 1 TABLET BY MOUTH ONCE  DAILY 90 tablet 3    [DISCONTINUED] aspirin 81 mg EC tablet Take 1 tablet (81 mg) by mouth once daily.       No current facility-administered medications on file prior to visit.        Assessment/Plan   Problem List Items Addressed This Visit             ICD-10-CM    CKD stage 3 secondary to diabetes (CMS/HCC) E11.22, N18.30    COPD, mild (CMS/HCC) J44.9     Stable, little  shortness of breath  Received influenza vaccine at office on 10/5/23  Received PPSV23 and PCV13 in the past  Continue: Breo 100-25 mcg/actuation inhaler.          Diabetes mellitus (CMS/[facility]) - Primary E11.9     Patient's diabetes needs improvement with most recent A1c of 8% (Goal < 7%) on 10/5/23.   Initiate: Jardiance 25 mg once daily if/when  Patient Assistance is approved.  Continue: Trulicity 3mg weekly injection (2 doses remaining)  Increase Trulicity to 4.5 mg once weekly if/when  Patient Assistance is approved.  Continue: metformin  mg 2 tablets twice daily  Continue: glimepiride 2 mg twice daily  Hopefully we can decrease and/or glimepiride in the future  Compliance at present is estimated to be fair.   Education Provided to Patient: Jardiance MOA and potential adverse effects.   Follow-up: After  PAP is determined  PCP Follow-Up: 1/8/24         Chronic renal disease, stage IV (CMS/[facility]) N18.4       Linh Carolina, PharmD    Continue all meds under the continuation of care with the referring provider and clinical pharmacy team.    Verbal consent to manage patient's drug therapy was obtained from the patient. They were informed they may decline to participate or withdraw from participation in pharmacy services at any time.

## 2023-10-06 NOTE — ASSESSMENT & PLAN NOTE
Patient's diabetes needs improvement with most recent A1c of 8% (Goal < 7%) on 10/5/23.   Initiate: Jardiance 25 mg once daily if/when  Patient Assistance is approved.  Continue: Trulicity 3mg weekly injection (2 doses remaining)  Increase Trulicity to 4.5 mg once weekly if/when  Patient Assistance is approved.  Continue: metformin  mg 2 tablets twice daily  Continue: glimepiride 2 mg twice daily  Hopefully we can decrease and/or glimepiride in the future  Compliance at present is estimated to be fair.   Education Provided to Patient: Jardiance MOA and potential adverse effects.   Follow-up: After  PAP is determined  PCP Follow-Up: 1/8/24

## 2023-10-07 DIAGNOSIS — R05.3 CHRONIC COUGH: ICD-10-CM

## 2023-10-09 RX ORDER — MONTELUKAST SODIUM 10 MG/1
10 TABLET ORAL NIGHTLY
Qty: 30 TABLET | Refills: 11 | OUTPATIENT
Start: 2023-10-09

## 2023-10-16 ENCOUNTER — PHARMACY VISIT (OUTPATIENT)
Dept: PHARMACY | Facility: CLINIC | Age: 73
End: 2023-10-16
Payer: COMMERCIAL

## 2023-10-16 ENCOUNTER — TELEPHONE (OUTPATIENT)
Dept: PHARMACY | Facility: HOSPITAL | Age: 73
End: 2023-10-16
Payer: MEDICARE

## 2023-10-16 PROCEDURE — RXMED WILLOW AMBULATORY MEDICATION CHARGE

## 2023-10-16 NOTE — TELEPHONE ENCOUNTER
Patient was approved for  Patient Assistance Program today for Jardiance 25 mg, Trulicity 4.5 mg, and Breo Ellipta. [facility] should be reaching out today or tomorrow to coordinate delivery. Javier has enough medication to last through next week. Removed/discontinued Trulicity 3 mg from med list. We will follow up on 11/15/23 for diabetes phone call.

## 2023-10-27 ENCOUNTER — APPOINTMENT (OUTPATIENT)
Dept: OPHTHALMOLOGY | Facility: CLINIC | Age: 73
End: 2023-10-27
Payer: MEDICARE

## 2023-10-30 ENCOUNTER — CLINICAL SUPPORT (OUTPATIENT)
Dept: VASCULAR MEDICINE | Facility: CLINIC | Age: 73
End: 2023-10-30
Payer: MEDICARE

## 2023-10-30 DIAGNOSIS — Z13.6 ENCOUNTER FOR ABDOMINAL AORTIC ANEURYSM (AAA) SCREENING: ICD-10-CM

## 2023-10-30 PROCEDURE — 76706 US ABDL AORTA SCREEN AAA: CPT

## 2023-10-30 PROCEDURE — 76706 US ABDL AORTA SCREEN AAA: CPT | Performed by: INTERNAL MEDICINE

## 2023-11-02 DIAGNOSIS — R05.3 CHRONIC COUGH: ICD-10-CM

## 2023-11-02 DIAGNOSIS — E78.5 DYSLIPIDEMIA: ICD-10-CM

## 2023-11-06 RX ORDER — LISINOPRIL 40 MG/1
40 TABLET ORAL DAILY
Qty: 100 TABLET | Refills: 0 | Status: SHIPPED | OUTPATIENT
Start: 2023-11-06 | End: 2024-01-15

## 2023-11-06 RX ORDER — MONTELUKAST SODIUM 10 MG/1
10 TABLET ORAL NIGHTLY
Qty: 100 TABLET | Refills: 0 | Status: SHIPPED | OUTPATIENT
Start: 2023-11-06 | End: 2024-01-15

## 2023-11-15 ENCOUNTER — TELEMEDICINE (OUTPATIENT)
Dept: PHARMACY | Facility: HOSPITAL | Age: 73
End: 2023-11-15
Payer: MEDICARE

## 2023-11-15 DIAGNOSIS — E11.22 CKD STAGE 3 SECONDARY TO DIABETES (MULTI): ICD-10-CM

## 2023-11-15 DIAGNOSIS — N18.4 CHRONIC RENAL DISEASE, STAGE IV (MULTI): ICD-10-CM

## 2023-11-15 DIAGNOSIS — E11.9 TYPE 2 DIABETES MELLITUS WITHOUT COMPLICATION, WITHOUT LONG-TERM CURRENT USE OF INSULIN (MULTI): ICD-10-CM

## 2023-11-15 DIAGNOSIS — N18.30 CKD STAGE 3 SECONDARY TO DIABETES (MULTI): ICD-10-CM

## 2023-11-15 DIAGNOSIS — J44.9 COPD, MILD (MULTI): ICD-10-CM

## 2023-11-15 ASSESSMENT — ENCOUNTER SYMPTOMS: POLYPHAGIA: 1

## 2023-11-15 NOTE — ASSESSMENT & PLAN NOTE
Stable, little shortness of breath even after exertion (raking yard).  Continue: Breo 100-25 mcg/actuation inhaler.

## 2023-11-15 NOTE — ASSESSMENT & PLAN NOTE
Patient's diabetes needs improvement with most recent A1c of 8% (Goal < 7%) on 10/5/23.   Continue: Jardiance 25 mg once daily  Continue: Trulicity 4.5 mg weekly injection  Continue: metformin  mg 2 tablets twice daily  Decrease: glimepiride 2 mg from twice daily to once daily (taking in the morning)  Compliance at present is estimated to be fair. Encouraged patient to use treadmill at home.  Education Provided to Patient: Hypoglycemia s/sx and management  Follow-up: 12/13/23 at 11 am  PCP Follow-Up: 1/8/24

## 2023-11-15 NOTE — PROGRESS NOTES
Subjective   Patient ID: Derrek Messina is a 73 y.o. male who presents for Diabetes.    Referring Provider: Jeremías Encinas DO     Diabetes  He presents for his follow-up diabetic visit. He has type 2 diabetes mellitus. There are no hypoglycemic associated symptoms. Associated symptoms include polyphagia. Current diabetic treatment includes oral agent (triple therapy). He rarely participates in exercise.     Breakfast: Multigrain bread or multiwheat English muffin or brown rice a 2-3 times a week; oranges  Dinner: pasta, red meat occasionally, chicken, peas, carrots  Drinks: Water, Diet Pepsi, 2% milk, black coffee    Javier was using a glucometer to check his blood sugars, but is in the process of changing to a Dexcom (should have it running within a week). He has not checked his blood sugar often in the past few weeks. Fasting blood sugar was in the 130s a few weeks ago.    Objective     There were no vitals taken for this visit.     Labs  Lab Results   Component Value Date    BILITOT 0.3 02/25/2023    CALCIUM 9.7 10/05/2023    CO2 21 10/05/2023     (H) 10/05/2023    CREATININE 2.09 (H) 10/05/2023    GLUCOSE 197 (H) 10/05/2023    ALKPHOS 45 02/25/2023    K 5.8 (H) 10/05/2023    PROT 7.6 02/25/2023     10/05/2023    AST 20 02/25/2023    ALT 21 02/25/2023    BUN 37 (H) 10/05/2023    ANIONGAP 16 10/05/2023    PHOS 3.0 10/05/2023    ALBUMIN 4.3 10/05/2023    GFRMALE 31 (A) 08/22/2023     Lab Results   Component Value Date    TRIG 532 (H) 06/20/2023    CHOL 135 06/20/2023    LDLCALC  02/25/2023     Unable to report calculated LDL due to increased triglycerides. Direct    HDL 35.1 (A) 06/20/2023     Lab Results   Component Value Date    HGBA1C 8.0 (H) 10/05/2023       Current Outpatient Medications on File Prior to Visit   Medication Sig Dispense Refill    glimepiride (Amaryl) 2 mg tablet TAKE 1 TABLET BY MOUTH  TWICE DAILY 200 tablet 2    atenolol (Tenormin) 50 mg tablet Take 1 tablet (50 mg) by mouth once  daily. 100 tablet 0    dulaglutide (Trulicity) 4.5 mg/0.5 mL pen injector Inject 4.5 mg under the skin 1 (one) time per week. 6 mL 1    DULoxetine (Cymbalta) 60 mg DR capsule Take 1 capsule (60 mg) by mouth once daily.      empagliflozin (Jardiance) 25 mg Take 1 tablet (25 mg) by mouth once daily. 90 tablet 1    ezetimibe (Zetia) 10 mg tablet TAKE 1 TABLET BY MOUTH  DAILY 90 tablet 3    fenofibrate (Triglide) 160 mg tablet Take 1 tablet (160 mg) by mouth once daily.      fluticasone (Flonase) 50 mcg/actuation nasal spray Administer 1 spray into each nostril once daily.      fluticasone furoate-vilanteroL (Breo Ellipta) 100-25 mcg/dose inhaler Inhale 1 puff once daily. Rinse mouth with water after use 180 each 1    lisinopril 40 mg tablet Take 1 tablet (40 mg) by mouth once daily. 100 tablet 0    metFORMIN XR (Glucophage-XR) 500 mg 24 hr tablet TAKE 2 TABLETS BY MOUTH  TWICE DAILY 360 tablet 3    montelukast (Singulair) 10 mg tablet Take 1 tablet (10 mg) by mouth once daily at bedtime. 100 tablet 0    multivitamin-iron-folic acid (Centrum Complete)  mg-mcg tablet tablet Take 1 tablet by mouth once daily.      pravastatin (Pravachol) 40 mg tablet TAKE 1 TABLET BY MOUTH ONCE  DAILY 90 tablet 3     No current facility-administered medications on file prior to visit.        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  Continue: Breo 100-25 mcg/actuation inhaler.          Relevant Orders    Follow Up In [facility] - Pharmacy    Diabetes mellitus (CMS/HCC) E11.9     Patient's diabetes needs improvement with most recent A1c of 8% (Goal < 7%) on 10/5/23.   Continue: Jardiance 25 mg once daily  Continue: Trulicity 4.5 mg weekly injection  Continue: metformin  mg 2 tablets twice daily  Decrease:  glimepiride 2 mg from twice daily to once daily (taking in the morning)  Compliance at present is estimated to be fair. Encouraged patient to use treadmill at home.  Education Provided to Patient: Hypoglycemia s/sx and management  Follow-up: 12/13/23 at 11 am  PCP Follow-Up: 1/8/24         Relevant Orders    Follow Up In [facility] - Pharmacy    Chronic renal disease, stage IV (CMS/[facility]) N18.4     Linh Carolina, PharmD    Continue all meds under the continuation of care with the referring provider and clinical pharmacy team.    Verbal consent to manage patient's drug therapy was obtained from the patient. They were informed they may decline to participate or withdraw from participation in pharmacy services at any time.

## 2023-11-27 ENCOUNTER — PHARMACY VISIT (OUTPATIENT)
Dept: PHARMACY | Facility: CLINIC | Age: 73
End: 2023-11-27
Payer: COMMERCIAL

## 2023-11-27 PROCEDURE — RXMED WILLOW AMBULATORY MEDICATION CHARGE

## 2023-12-01 DIAGNOSIS — I10 BENIGN ESSENTIAL HYPERTENSION: ICD-10-CM

## 2023-12-04 DIAGNOSIS — E78.49 FAMILIAL COMBINED HYPERLIPIDEMIA: Primary | ICD-10-CM

## 2023-12-04 RX ORDER — FENOFIBRATE 160 MG/1
160 TABLET ORAL DAILY
Qty: 90 TABLET | Refills: 3 | Status: SHIPPED | OUTPATIENT
Start: 2023-12-04 | End: 2024-12-03

## 2023-12-04 RX ORDER — ATENOLOL 50 MG/1
50 TABLET ORAL DAILY
Qty: 100 TABLET | Refills: 0 | Status: SHIPPED | OUTPATIENT
Start: 2023-12-04 | End: 2024-02-13

## 2023-12-04 NOTE — TELEPHONE ENCOUNTER
Aware that GFR hovering just above 30.  If consistently dips below 30 will likely need to stop fenofibrate due to contraindication.  Benefits at this point probably outweigh risks due to his significant triglyceride elevation and inability or unwillingness to take fish oil at the moment.  
LOV note 9/2023 reviewed. Annual visit scheduled. Script cued and forwarded to Dr. Willams to send  
143.9

## 2023-12-13 ENCOUNTER — TELEMEDICINE (OUTPATIENT)
Dept: PHARMACY | Facility: HOSPITAL | Age: 73
End: 2023-12-13
Payer: MEDICARE

## 2023-12-13 DIAGNOSIS — E11.9 TYPE 2 DIABETES MELLITUS WITHOUT COMPLICATION, WITHOUT LONG-TERM CURRENT USE OF INSULIN (MULTI): Primary | ICD-10-CM

## 2023-12-13 DIAGNOSIS — J44.9 COPD, MILD (MULTI): ICD-10-CM

## 2023-12-13 ASSESSMENT — ENCOUNTER SYMPTOMS: POLYPHAGIA: 1

## 2023-12-13 NOTE — PROGRESS NOTES
Subjective   Patient ID: Derrek Messina is a 73 y.o. male who presents for Diabetes.    Referring Provider: Jeremías Encinas DO     Diabetes  He presents for his follow-up diabetic visit. He has type 2 diabetes mellitus. There are no hypoglycemic associated symptoms. Associated symptoms include polyphagia. Current diabetic treatment includes oral agent (triple therapy). He rarely participates in exercise.     Breakfast: Multigrain bread or multiwheat English muffin or brown rice a 2-3 times a week; oranges  Dinner: pasta, red meat occasionally, chicken, peas, carrots  Drinks: Water, Diet Pepsi, 2% milk, black coffee    Physical activity includes yardwork. He has been on the treadmill once since last visit and plans to get back into it.    Javier is using a glucometer to check his blood sugars occasionally. Fasting blood sugars have been 135-145 mg/dL. He has been busy and not set up the Dexcom yet.     Objective     There were no vitals taken for this visit.     Labs  Lab Results   Component Value Date    BILITOT 0.3 02/25/2023    CALCIUM 9.7 10/05/2023    CO2 21 10/05/2023     (H) 10/05/2023    CREATININE 2.09 (H) 10/05/2023    GLUCOSE 197 (H) 10/05/2023    ALKPHOS 45 02/25/2023    K 5.8 (H) 10/05/2023    PROT 7.6 02/25/2023     10/05/2023    AST 20 02/25/2023    ALT 21 02/25/2023    BUN 37 (H) 10/05/2023    ANIONGAP 16 10/05/2023    PHOS 3.0 10/05/2023    ALBUMIN 4.3 10/05/2023    GFRMALE 31 (A) 08/22/2023     Lab Results   Component Value Date    TRIG 532 (H) 06/20/2023    CHOL 135 06/20/2023    LDLCALC  02/25/2023     Unable to report calculated LDL due to increased triglycerides. Direct    HDL 35.1 (A) 06/20/2023     Lab Results   Component Value Date    HGBA1C 8.0 (H) 10/05/2023       Current Outpatient Medications on File Prior to Visit   Medication Sig Dispense Refill    atenolol (Tenormin) 50 mg tablet Take 1 tablet (50 mg) by mouth once daily. 100 tablet 0    dulaglutide (Trulicity) 4.5 mg/0.5 mL  pen injector Inject 4.5 mg under the skin 1 (one) time per week. 6 mL 1    DULoxetine (Cymbalta) 60 mg DR capsule Take 1 capsule (60 mg) by mouth once daily.      empagliflozin (Jardiance) 25 mg Take 1 tablet (25 mg) by mouth once daily. 90 tablet 1    ezetimibe (Zetia) 10 mg tablet TAKE 1 TABLET BY MOUTH  DAILY 90 tablet 3    fenofibrate (Triglide) 160 mg tablet Take 1 tablet (160 mg) by mouth once daily. 90 tablet 3    fluticasone (Flonase) 50 mcg/actuation nasal spray Administer 1 spray into each nostril once daily.      fluticasone furoate-vilanteroL (Breo Ellipta) 100-25 mcg/dose inhaler Inhale 1 puff once daily. Rinse mouth with water after use 180 each 1    glimepiride (Amaryl) 2 mg tablet TAKE 1 TABLET BY MOUTH  TWICE DAILY 200 tablet 2    lisinopril 40 mg tablet Take 1 tablet (40 mg) by mouth once daily. 100 tablet 0    metFORMIN XR (Glucophage-XR) 500 mg 24 hr tablet TAKE 2 TABLETS BY MOUTH  TWICE DAILY 360 tablet 3    montelukast (Singulair) 10 mg tablet Take 1 tablet (10 mg) by mouth once daily at bedtime. 100 tablet 0    multivitamin-iron-folic acid (Centrum Complete)  mg-mcg tablet tablet Take 1 tablet by mouth once daily.      pravastatin (Pravachol) 40 mg tablet TAKE 1 TABLET BY MOUTH ONCE  DAILY 90 tablet 3     No current facility-administered medications on file prior to visit.        Assessment/Plan   Problem List Items Addressed This Visit             ICD-10-CM    COPD, mild (CMS/[facility]) J44.9    Diabetes mellitus (CMS/HCC) E11.9     Patient's diabetes needs improvement with most recent A1c of 8% (Goal < 7%) on 10/5/23. A1C due at next PCP visit.  Continue: Jardiance 25 mg once daily  Continue: Trulicity 4.5 mg weekly injection  Continue: metformin  mg 2 tablets twice daily  Continue: glimepiride 2 mg once daily in the morning  Compliance at present is estimated to be fair.   Follow-up: 1/24/24 at 11 am  PCP Follow-Up: 1/8/24          Linh Carolina, PharmD    Continue all meds under the  continuation of care with the referring provider and clinical pharmacy team.    Verbal consent to manage patient's drug therapy was obtained from the patient. They were informed they may decline to participate or withdraw from participation in pharmacy services at any time.

## 2023-12-13 NOTE — ASSESSMENT & PLAN NOTE
Patient's diabetes needs improvement with most recent A1c of 8% (Goal < 7%) on 10/5/23. A1C due at next PCP visit.  Continue: Jardiance 25 mg once daily  Continue: Trulicity 4.5 mg weekly injection  Continue: metformin  mg 2 tablets twice daily  Continue: glimepiride 2 mg once daily in the morning  Compliance at present is estimated to be fair.   Follow-up: 1/24/24 at 11 am  PCP Follow-Up: 1/8/24

## 2023-12-17 DIAGNOSIS — E11.9 TYPE 2 DIABETES MELLITUS WITHOUT COMPLICATION, WITHOUT LONG-TERM CURRENT USE OF INSULIN (MULTI): ICD-10-CM

## 2023-12-18 RX ORDER — METFORMIN HYDROCHLORIDE 500 MG/1
1000 TABLET, EXTENDED RELEASE ORAL 2 TIMES DAILY
Qty: 400 TABLET | Refills: 0 | Status: SHIPPED | OUTPATIENT
Start: 2023-12-18 | End: 2024-05-14

## 2024-01-03 PROCEDURE — RXMED WILLOW AMBULATORY MEDICATION CHARGE

## 2024-01-08 ENCOUNTER — OFFICE VISIT (OUTPATIENT)
Dept: PRIMARY CARE | Facility: CLINIC | Age: 74
End: 2024-01-08
Payer: MEDICARE

## 2024-01-08 VITALS
HEART RATE: 71 BPM | BODY MASS INDEX: 35.74 KG/M2 | DIASTOLIC BLOOD PRESSURE: 53 MMHG | SYSTOLIC BLOOD PRESSURE: 98 MMHG | OXYGEN SATURATION: 98 % | WEIGHT: 242 LBS

## 2024-01-08 DIAGNOSIS — E11.22 CKD STAGE 3 SECONDARY TO DIABETES (MULTI): ICD-10-CM

## 2024-01-08 DIAGNOSIS — R05.3 CHRONIC COUGH: ICD-10-CM

## 2024-01-08 DIAGNOSIS — Z13.6 ENCOUNTER FOR ABDOMINAL AORTIC ANEURYSM (AAA) SCREENING: ICD-10-CM

## 2024-01-08 DIAGNOSIS — R93.1 AGATSTON CORONARY ARTERY CALCIUM SCORE GREATER THAN 400: ICD-10-CM

## 2024-01-08 DIAGNOSIS — J42 CHRONIC BRONCHITIS, UNSPECIFIED CHRONIC BRONCHITIS TYPE (MULTI): ICD-10-CM

## 2024-01-08 DIAGNOSIS — N18.4 CHRONIC RENAL DISEASE, STAGE IV (MULTI): ICD-10-CM

## 2024-01-08 DIAGNOSIS — J44.9 CHRONIC OBSTRUCTIVE PULMONARY DISEASE, UNSPECIFIED COPD TYPE (MULTI): ICD-10-CM

## 2024-01-08 DIAGNOSIS — Z23 NEEDS FLU SHOT: ICD-10-CM

## 2024-01-08 DIAGNOSIS — I10 BENIGN ESSENTIAL HYPERTENSION: ICD-10-CM

## 2024-01-08 DIAGNOSIS — N18.30 CKD STAGE 3 SECONDARY TO DIABETES (MULTI): ICD-10-CM

## 2024-01-08 DIAGNOSIS — E66.01 MORBID (SEVERE) OBESITY DUE TO EXCESS CALORIES (MULTI): ICD-10-CM

## 2024-01-08 DIAGNOSIS — E11.9 TYPE 2 DIABETES MELLITUS WITHOUT COMPLICATION, WITHOUT LONG-TERM CURRENT USE OF INSULIN (MULTI): ICD-10-CM

## 2024-01-08 DIAGNOSIS — J44.9 COPD, MILD (MULTI): Primary | ICD-10-CM

## 2024-01-08 PROBLEM — R11.10 REGURGITATION OF FOOD: Status: RESOLVED | Noted: 2023-08-26 | Resolved: 2024-01-08

## 2024-01-08 PROBLEM — L30.4 INTERTRIGO: Status: RESOLVED | Noted: 2023-08-26 | Resolved: 2024-01-08

## 2024-01-08 PROBLEM — R13.10 DYSPHAGIA: Status: RESOLVED | Noted: 2023-08-26 | Resolved: 2024-01-08

## 2024-01-08 PROBLEM — R06.02 EXERTIONAL SHORTNESS OF BREATH: Status: RESOLVED | Noted: 2023-08-26 | Resolved: 2024-01-08

## 2024-01-08 PROBLEM — N35.919 URETHRAL MEATAL STENOSIS: Status: RESOLVED | Noted: 2023-06-06 | Resolved: 2024-01-08

## 2024-01-08 PROBLEM — R53.81 PHYSICAL DECONDITIONING: Status: RESOLVED | Noted: 2023-08-26 | Resolved: 2024-01-08

## 2024-01-08 PROBLEM — L29.9 PRURITUS: Status: RESOLVED | Noted: 2023-08-26 | Resolved: 2024-01-08

## 2024-01-08 PROBLEM — Z86.2 HISTORY OF IMMUNE DISORDER: Status: RESOLVED | Noted: 2023-08-26 | Resolved: 2024-01-08

## 2024-01-08 PROBLEM — L73.9 FOLLICULITIS: Status: RESOLVED | Noted: 2023-08-26 | Resolved: 2024-01-08

## 2024-01-08 PROBLEM — N17.9 ACUTE KIDNEY FAILURE (CMS-HCC): Status: RESOLVED | Noted: 2023-05-01 | Resolved: 2024-01-08

## 2024-01-08 PROBLEM — L98.9 SKIN LESION: Status: RESOLVED | Noted: 2023-08-26 | Resolved: 2024-01-08

## 2024-01-08 PROBLEM — H93.13 TINNITUS OF BOTH EARS: Status: RESOLVED | Noted: 2017-09-17 | Resolved: 2024-01-08

## 2024-01-08 PROBLEM — R19.4 CHANGE IN BOWEL HABITS: Status: RESOLVED | Noted: 2023-08-26 | Resolved: 2024-01-08

## 2024-01-08 LAB
CREAT UR-MCNC: 78.4 MG/DL (ref 20–370)
EST. AVERAGE GLUCOSE BLD GHB EST-MCNC: 183 MG/DL
HBA1C MFR BLD: 8 %
MICROALBUMIN UR-MCNC: 168.4 MG/L
MICROALBUMIN/CREAT UR: 214.8 UG/MG CREAT

## 2024-01-08 PROCEDURE — 83036 HEMOGLOBIN GLYCOSYLATED A1C: CPT

## 2024-01-08 PROCEDURE — 80069 RENAL FUNCTION PANEL: CPT

## 2024-01-08 PROCEDURE — 36415 COLL VENOUS BLD VENIPUNCTURE: CPT

## 2024-01-08 PROCEDURE — 1159F MED LIST DOCD IN RCRD: CPT | Performed by: INTERNAL MEDICINE

## 2024-01-08 PROCEDURE — 1036F TOBACCO NON-USER: CPT | Performed by: INTERNAL MEDICINE

## 2024-01-08 PROCEDURE — RXMED WILLOW AMBULATORY MEDICATION CHARGE

## 2024-01-08 PROCEDURE — 3066F NEPHROPATHY DOC TX: CPT | Performed by: INTERNAL MEDICINE

## 2024-01-08 PROCEDURE — 4010F ACE/ARB THERAPY RXD/TAKEN: CPT | Performed by: INTERNAL MEDICINE

## 2024-01-08 PROCEDURE — 99214 OFFICE O/P EST MOD 30 MIN: CPT | Performed by: INTERNAL MEDICINE

## 2024-01-08 PROCEDURE — 82570 ASSAY OF URINE CREATININE: CPT

## 2024-01-08 PROCEDURE — 82043 UR ALBUMIN QUANTITATIVE: CPT

## 2024-01-08 PROCEDURE — 3074F SYST BP LT 130 MM HG: CPT | Performed by: INTERNAL MEDICINE

## 2024-01-08 PROCEDURE — 3078F DIAST BP <80 MM HG: CPT | Performed by: INTERNAL MEDICINE

## 2024-01-08 PROCEDURE — 1160F RVW MEDS BY RX/DR IN RCRD: CPT | Performed by: INTERNAL MEDICINE

## 2024-01-08 RX ORDER — FLUTICASONE FUROATE, UMECLIDINIUM BROMIDE AND VILANTEROL TRIFENATATE 200; 62.5; 25 UG/1; UG/1; UG/1
1 POWDER RESPIRATORY (INHALATION)
Qty: 60 EACH | Refills: 1 | Status: SHIPPED | OUTPATIENT
Start: 2024-01-08 | End: 2024-01-08 | Stop reason: SDUPTHER

## 2024-01-08 RX ORDER — FLUTICASONE FUROATE, UMECLIDINIUM BROMIDE AND VILANTEROL TRIFENATATE 200; 62.5; 25 UG/1; UG/1; UG/1
1 POWDER RESPIRATORY (INHALATION)
Qty: 60 EACH | Refills: 2 | Status: SHIPPED | OUTPATIENT
Start: 2024-01-08 | End: 2024-04-03 | Stop reason: SDUPTHER

## 2024-01-08 NOTE — ASSESSMENT & PLAN NOTE
Patient's diabetes needs improvement with most recent A1c of 8% (Goal < 7%) on 10/5/23.  Continue: Jardiance 25 mg once daily  Continue: Trulicity 4.5 mg weekly injection  Continue: metformin  mg 2 tablets twice daily  Continue: glimepiride 2 mg once daily in the morning  Compliance at present is estimated to be fair.     May need to stop metformin should his GFR dropped below 30.  Adjust medications pending results.  Pneumonia vaccine up-to-date.  Advise annual eye exams.  Check urine albumin.  Continue high-dose ACE inhibitor.  Continue SGLT2 although from a diabetic standpoint less likely to provide some benefit

## 2024-01-08 NOTE — PROGRESS NOTES
Subjective   Patient ID: Derrek Messina is a 73 y.o. male who presents for Follow-up.    HPI     Patient is a 72-year-old male with past medical history of obstructive sleep apnea diabetes mellitus type 2 hypertension dyslipidemia and asymptomatic coronary artery disease who presents for follow up    Patient with obstructive sleep apnea following with ENT.    Diabetes mellitus type 2. [id number] last visit . He is compliant with medications and takes his metformin and Trulicity as well as glimepiride. He denies increased thirst or increased urination.   No numbness or tingling of the extremities.   Patient on metformin and may need to stop the medication should his GFR dropped below 30    Hypertension.  Currently well controlled on current medications denies headache changes in vision orthopnea.  Reports compliance with his medications.    Dyslipidemia with elevated triglycerides. Due for LDL recheck and reevaluation of the triglyceride levels.    Asym CAD. Following with cards.     COPD. Not smoking. Occasional SOB on exertion but overall stable. No recent exacerbation He denies any wheezing. Most recent PFTs showing mild to moderate obstruction patient is currently taking Breo and albuterol.     CKD 3 with proteinuria. Pt on lisinopril 40mg.     B/L renal cysts with hx of nephrolithiasis. Following with nephro.  Recent ultrasound of the kidneys shows stability of the cysts      Review of Systems  Constitutional: No fever or chills  Cardiovascular: no chest pain, no palpitations and no syncope.   Respiratory: no cough, no shortness of breath during exertion and no shortness of breath at rest.   Gastrointestinal: no abdominal pain, no nausea and no vomiting.  Neuro: No Headache, no dizziness    Objective   BP 98/53   Pulse 71   Wt 110 kg (242 lb)   SpO2 98%   BMI 35.74 kg/m²     Physical Exam  Constitutional: Alert and in no acute distress. Well developed, well nourished  Head and Face: Head and face:  Normal.    Cardiovascular: Heart rate and rhythm were normal, normal S1 and S2. No peripheral edema.   Pulmonary: No respiratory distress. Clear bilateral breath sounds.  Musculoskeletal: Gait and station: Normal. Muscle strength/tone: Normal.   Skin: Normal skin color and pigmentation, normal skin turgor, and no rash.    Psychiatric: Judgment and insight: Intact. Mood and affect: Normal.        Lab Results   Component Value Date    WBC 7.6 06/20/2023    HGB 15.2 06/20/2023    HCT 48.2 06/20/2023     06/20/2023    CHOL 135 06/20/2023    TRIG 532 (H) 06/20/2023    HDL 35.1 (A) 06/20/2023    LDLDIRECT 47 (L) 02/25/2023    ALT 21 02/25/2023    AST 20 02/25/2023     10/05/2023    K 5.8 (H) 10/05/2023     (H) 10/05/2023    CREATININE 2.09 (H) 10/05/2023    BUN 37 (H) 10/05/2023    CO2 21 10/05/2023    TSH 1.72 06/10/2021    PSA 0.7 02/25/2023    HGBA1C 8.0 (H) 10/05/2023    ALBUR 259 (H) 02/25/2023       Vascular US abdominal aorta anuerysm AAA screening             [place] [address]             Phone [phone]       Vascular Lab Report     VASC US ABDOMINAL AORTA ANEURYSM AAA SCREENING    Patient Name:      JD SINHA   Reading Physician: [id number] Lisa Stauffer MD  Study Date:        10/30/2023          Ordering Provider: [id number] BEVERLEY BOWLING  MRN/PID:           [id number]            Fellow:  Accession#:        [id number]        Technologist:      Chelsea Hankins RVT  Date of Birth/Age: 1950 / 73 years Technologist 2:  Gender:            M                   Encounter#:        [id number]  Admission Status:  Outpatient          Location           [facility]                                         Performed:       Diagnosis/ICD: Encounter for screening for cardiovascular disorders (AAA)-Z13.6  CPT Codes:     51864 Ultrasound, abdominal aorta, real time with image                  documentation, screening study for (AAA)       CONCLUSIONS:  Aorta/Common Iliac Arteries/IVC: The abdominal aorta demonstrates no evidence of aneurysm. Atheroscleroic plaque versus chronic dissection is noted at the distal aorta.     Imaging & Doppler Findings:     AORTA    AP    Lateral    PSV  Distal 1.80 cm 1.80 cm 142.0 cm/s       [id number] Lisa Stauffer MD  Electronically signed by [id number] Lisa Stauffer MD on 10/31/2023 at 8:48:09 AM       ** Final **            Assessment/Plan   Problem List Items Addressed This Visit       Chronic bronchitis, unspecified chronic bronchitis type (CMS/HCC)    Relevant Orders    Complete Pulmonary Function Test Pre/Post Bronchodialator (Spirometry Pre/Post/DLCO/Lung Volumes)    Follow Up In [facility] - PCP - Medicare Annual    CKD stage 3 secondary to diabetes (CMS/[facility])     Continue ACE inhibitor and SGLT2.  Avoid ibuprofen check renal function         Relevant Orders    Referral to Nephrology    Follow Up In [facility] - PCP - Medicare Annual    Morbid (severe) obesity due to excess calories (CMS/[facility])     Encouraged weight reduction and regular exercise.         Relevant Orders    Follow Up In [facility] - PCP - Medicare Annual    Agatston coronary artery calcium score greater than 400     Continue following with cardiology for primary prevention.  Continue pravastatin Zetia and fenofibrate         Relevant Orders    Follow Up In [facility] - PCP - Medicare Annual    Benign essential hypertension     Controlled on current medications.  No medication adjustments         Relevant Orders    Follow Up In [facility] - PCP - Medicare Annual    COPD, mild (CMS/[facility]) - Primary     Switch Breo to Trelegy if covered.  Check pulmonary function test         Relevant Orders    Follow Up In [facility] - PCP - Medicare Annual    Diabetes mellitus (CMS/[facility])     Patient's diabetes needs improvement with most recent A1c of 8% (Goal <  7%) on 10/5/23.  Continue: Jardiance 25 mg once daily  Continue: Trulicity 4.5 mg weekly injection  Continue: metformin  mg 2 tablets twice daily  Continue: glimepiride 2 mg once daily in the morning  Compliance at present is estimated to be fair.     May need to stop metformin should his GFR dropped below 30.  Adjust medications pending results.  Pneumonia vaccine up-to-date.  Advise annual eye exams.  Check urine albumin.  Continue high-dose ACE inhibitor.  Continue SGLT2 although from a diabetic standpoint less likely to provide some benefit         Relevant Orders    Renal function panel    Hemoglobin A1C    Albumin , Urine Random    Follow Up In [facility] - PCP - Medicare Annual    Cough    Relevant Orders    Follow Up In [facility] - PCP - Medicare Annual    RESOLVED: Chronic renal disease, stage IV (CMS/HCC)    Relevant Orders    Follow Up In [facility] - PCP - Medicare Annual     Other Visit Diagnoses       Needs flu shot        Relevant Orders    Follow Up In [facility] - PCP - Medicare Annual    Encounter for abdominal aortic aneurysm (AAA) screening        Relevant Orders    Follow Up In [facility] - PCP - Medicare Annual    Chronic obstructive pulmonary disease, unspecified COPD type (CMS/HCC)        Relevant Orders    Follow Up In [facility] - PCP - Medicare Annual

## 2024-01-08 NOTE — ASSESSMENT & PLAN NOTE
Continue following with cardiology for primary prevention.  Continue pravastatin Zetia and fenofibrate

## 2024-01-09 ENCOUNTER — PHARMACY VISIT (OUTPATIENT)
Dept: PHARMACY | Facility: CLINIC | Age: 74
End: 2024-01-09
Payer: COMMERCIAL

## 2024-01-09 LAB
ALBUMIN SERPL BCP-MCNC: 4.4 G/DL (ref 3.4–5)
ANION GAP SERPL CALC-SCNC: 17 MMOL/L (ref 10–20)
BUN SERPL-MCNC: 42 MG/DL (ref 6–23)
CALCIUM SERPL-MCNC: 9.7 MG/DL (ref 8.6–10.6)
CHLORIDE SERPL-SCNC: 102 MMOL/L (ref 98–107)
CO2 SERPL-SCNC: 24 MMOL/L (ref 21–32)
CREAT SERPL-MCNC: 2.19 MG/DL (ref 0.5–1.3)
EGFRCR SERPLBLD CKD-EPI 2021: 31 ML/MIN/1.73M*2
GLUCOSE SERPL-MCNC: 230 MG/DL (ref 74–99)
PHOSPHATE SERPL-MCNC: 3.5 MG/DL (ref 2.5–4.9)
POTASSIUM SERPL-SCNC: 5.1 MMOL/L (ref 3.5–5.3)
SODIUM SERPL-SCNC: 138 MMOL/L (ref 136–145)

## 2024-01-09 PROCEDURE — RXMED WILLOW AMBULATORY MEDICATION CHARGE

## 2024-01-11 ENCOUNTER — PHARMACY VISIT (OUTPATIENT)
Dept: PHARMACY | Facility: CLINIC | Age: 74
End: 2024-01-11
Payer: COMMERCIAL

## 2024-01-11 DIAGNOSIS — G62.89 OTHER POLYNEUROPATHY: ICD-10-CM

## 2024-01-12 RX ORDER — DULOXETIN HYDROCHLORIDE 60 MG/1
60 CAPSULE, DELAYED RELEASE ORAL DAILY
Qty: 100 CAPSULE | Refills: 1 | Status: SHIPPED | OUTPATIENT
Start: 2024-01-12

## 2024-01-14 DIAGNOSIS — E78.5 DYSLIPIDEMIA: ICD-10-CM

## 2024-01-14 DIAGNOSIS — R05.3 CHRONIC COUGH: ICD-10-CM

## 2024-01-15 RX ORDER — MONTELUKAST SODIUM 10 MG/1
10 TABLET ORAL NIGHTLY
Qty: 100 TABLET | Refills: 3 | Status: SHIPPED | OUTPATIENT
Start: 2024-01-15

## 2024-01-15 RX ORDER — LISINOPRIL 40 MG/1
40 TABLET ORAL DAILY
Qty: 100 TABLET | Refills: 3 | Status: SHIPPED | OUTPATIENT
Start: 2024-01-15

## 2024-01-24 ENCOUNTER — TELEMEDICINE (OUTPATIENT)
Dept: PHARMACY | Facility: HOSPITAL | Age: 74
End: 2024-01-24
Payer: COMMERCIAL

## 2024-01-24 DIAGNOSIS — E11.9 TYPE 2 DIABETES MELLITUS WITHOUT COMPLICATION, WITHOUT LONG-TERM CURRENT USE OF INSULIN (MULTI): ICD-10-CM

## 2024-01-24 DIAGNOSIS — J44.9 COPD, MILD (MULTI): ICD-10-CM

## 2024-01-24 NOTE — ASSESSMENT & PLAN NOTE
Patient's diabetes needs improvement with most recent A1c of 8% (Goal < 7%) on 1/8/24.   Decrease metformin  mg 2 tablets twice daily to 1 tablet twice daily due to renal function.  Increase glimepiride 2 mg from once daily back up to twice daily.  Compliance at present is estimated to be fair.   Follow-up: 2/13/24 at 10:30 am  PCP Follow-Up: 4/11/24

## 2024-01-24 NOTE — PROGRESS NOTES
Subjective   Patient ID: Derrek Messina is a 73 y.o. male who presents for Diabetes.    Referring Provider: Jeremías Encinas DO     Diabetes  He presents for his follow-up diabetic visit. He has type 2 diabetes mellitus. There are no hypoglycemic associated symptoms. Current diabetic treatment includes oral agent (triple therapy). He is compliant with treatment all of the time. He is following a generally healthy diet. He participates in exercise three times a week. An ACE inhibitor/angiotensin II receptor blocker is being taken. Eye exam is current.     Breakfast: Multigrain bread or multiwheat English muffin or brown rice a 2-3 times a week; oranges  Dinner: pasta, red meat occasionally, chicken, peas, carrots  Drinks: Water, Diet Pepsi, 2% milk, black coffee    Physical activity includes yardwork. He has been on the treadmill 15-20 minutes at least 3 times a week.    Javier is using a glucometer to check his blood sugars occasionally. Has not checked blood sugars lately. He has been busy and not set up the Dexcom yet.     Current diabetes medications:  Jardiance 25 mg daily  Trulicity 4.5 mg weekly injection  Metformin  mg 2 tablets twice daily  Glimepiride 2 mg once daily in the morning    Objective     There were no vitals taken for this visit.     Labs  Lab Results   Component Value Date    BILITOT 0.3 02/25/2023    CALCIUM 9.7 01/08/2024    CO2 24 01/08/2024     01/08/2024    CREATININE 2.19 (H) 01/08/2024    GLUCOSE 230 (H) 01/08/2024    ALKPHOS 45 02/25/2023    K 5.1 01/08/2024    PROT 7.6 02/25/2023     01/08/2024    AST 20 02/25/2023    ALT 21 02/25/2023    BUN 42 (H) 01/08/2024    ANIONGAP 17 01/08/2024    PHOS 3.5 01/08/2024    ALBUMIN 4.4 01/08/2024    GFRMALE 31 (A) 08/22/2023     Lab Results   Component Value Date    TRIG 532 (H) 06/20/2023    CHOL 135 06/20/2023    LDLCALC  02/25/2023     Unable to report calculated LDL due to increased triglycerides. Direct    HDL 35.1 (A) 06/20/2023      Lab Results   Component Value Date    HGBA1C 8.0 (H) 01/08/2024       Current Outpatient Medications on File Prior to Visit   Medication Sig Dispense Refill    atenolol (Tenormin) 50 mg tablet Take 1 tablet (50 mg) by mouth once daily. 100 tablet 0    dulaglutide (Trulicity) 4.5 mg/0.5 mL pen injector Inject 4.5 mg under the skin 1 (one) time per week. 6 mL 1    DULoxetine (Cymbalta) 60 mg DR capsule Take 1 capsule (60 mg) by mouth once daily. 100 capsule 1    empagliflozin (Jardiance) 25 mg Take 1 tablet (25 mg) by mouth once daily. 90 tablet 1    ezetimibe (Zetia) 10 mg tablet TAKE 1 TABLET BY MOUTH  DAILY 90 tablet 3    fenofibrate (Triglide) 160 mg tablet Take 1 tablet (160 mg) by mouth once daily. 90 tablet 3    fluticasone-umeclidin-vilanter (Trelegy Ellipta) 200-62.5-25 mcg blister with device Inhale 1 puff once daily in the morning. Take before meals. 60 each 2    glimepiride (Amaryl) 2 mg tablet TAKE 1 TABLET BY MOUTH  TWICE DAILY 200 tablet 2    lisinopril 40 mg tablet Take 1 tablet (40 mg) by mouth once daily. 100 tablet 3    metFORMIN  mg 24 hr tablet Take 2 tablets (1,000 mg) by mouth 2 times a day. 400 tablet 0    montelukast (Singulair) 10 mg tablet Take 1 tablet (10 mg) by mouth once daily at bedtime. 100 tablet 3    multivitamin-iron-folic acid (Centrum Complete)  mg-mcg tablet tablet Take 1 tablet by mouth once daily.      pravastatin (Pravachol) 40 mg tablet TAKE 1 TABLET BY MOUTH ONCE  DAILY 90 tablet 3     No current facility-administered medications on file prior to visit.        Assessment/Plan   Problem List Items Addressed This Visit             ICD-10-CM    COPD, mild (CMS/[facility]) J44.9     Stable, little shortness of breath even after exertion (raking yard).  No change in symptoms since changing from Breo to Trelegy Ellipta         Relevant Orders    Follow Up In [facility] - Pharmacy    Diabetes mellitus (CMS/HCC) E11.9     Patient's diabetes needs improvement with  most recent A1c of 8% (Goal < 7%) on 1/8/24.   Decrease metformin  mg 2 tablets twice daily to 1 tablet twice daily due to renal function.  Increase glimepiride 2 mg from once daily back up to twice daily.  Compliance at present is estimated to be fair.   Follow-up: 2/13/24 at 10:30 am  PCP Follow-Up: 4/11/24         Relevant Orders    Follow Up In [facility] - Pharmacy     Linh Carolina PharmD    Continue all meds under the continuation of care with the referring provider and clinical pharmacy team.    Verbal consent to manage patient's drug therapy was obtained from the patient. They were informed they may decline to participate or withdraw from participation in pharmacy services at any time.

## 2024-01-24 NOTE — ASSESSMENT & PLAN NOTE
Stable, little shortness of breath even after exertion (raking yard).  No change in symptoms since changing from Breo to Trelegy Ellipta

## 2024-02-03 PROCEDURE — RXMED WILLOW AMBULATORY MEDICATION CHARGE

## 2024-02-07 ENCOUNTER — PHARMACY VISIT (OUTPATIENT)
Dept: PHARMACY | Facility: CLINIC | Age: 74
End: 2024-02-07
Payer: COMMERCIAL

## 2024-02-09 DIAGNOSIS — I10 BENIGN ESSENTIAL HYPERTENSION: ICD-10-CM

## 2024-02-13 ENCOUNTER — TELEMEDICINE (OUTPATIENT)
Dept: PHARMACY | Facility: HOSPITAL | Age: 74
End: 2024-02-13
Payer: COMMERCIAL

## 2024-02-13 DIAGNOSIS — E11.9 TYPE 2 DIABETES MELLITUS WITHOUT COMPLICATION, WITHOUT LONG-TERM CURRENT USE OF INSULIN (MULTI): ICD-10-CM

## 2024-02-13 DIAGNOSIS — J44.9 COPD, MILD (MULTI): ICD-10-CM

## 2024-02-13 RX ORDER — ATENOLOL 50 MG/1
50 TABLET ORAL DAILY
Qty: 100 TABLET | Refills: 0 | Status: SHIPPED | OUTPATIENT
Start: 2024-02-13 | End: 2024-04-22

## 2024-02-13 NOTE — ASSESSMENT & PLAN NOTE
Patient's diabetes needs improvement with most recent A1c of 8% (Goal < 7%) on 1/8/24.   No blood glucose readings available to assess effect of medication changes made last visit.   Compliance at present is estimated to be fair.   Follow-up: 3/12/24 at 10:30 am  PCP Follow-Up: 2/28/24

## 2024-02-13 NOTE — Clinical Note
Patient is transitioning care to you from Dr. Encinas. I would like to continue meeting with the patient virtually if it's okay with you.

## 2024-02-13 NOTE — PROGRESS NOTES
Subjective   Patient ID: Derrek Messina is a 73 y.o. male who presents for Diabetes and COPD.    Referring Provider: Jeremías Encinas DO/Jonathan Johnson MD     Diabetes  He presents for his follow-up diabetic visit. He has type 2 diabetes mellitus. There are no hypoglycemic associated symptoms. Current diabetic treatment includes oral agent (triple therapy). He is compliant with treatment all of the time. He is following a generally healthy diet. He participates in exercise three times a week. An ACE inhibitor/angiotensin II receptor blocker is being taken. Eye exam is current.     Breakfast: Multigrain bread or multiwheat English muffin or brown rice a 2-3 times a week; oranges  Dinner: pasta, red meat occasionally, chicken, peas, carrots  Drinks: Water, Diet Pepsi, 2% milk, black coffee    Physical activity includes yardwork. He has been on the treadmill 15-20 minutes at least 3 times a week.    Javier is using a glucometer to check his blood sugars occasionally. Has not checked blood sugars lately. Working on getting the Dexcom set up.    Current diabetes medications:  Jardiance 25 mg daily  Trulicity 4.5 mg weekly injection  Metformin  mg 1 tablet twice daily  Glimepiride 2 mg twice daily    Objective     There were no vitals taken for this visit.     Labs  Lab Results   Component Value Date    BILITOT 0.3 02/25/2023    CALCIUM 9.7 01/08/2024    CO2 24 01/08/2024     01/08/2024    CREATININE 2.19 (H) 01/08/2024    GLUCOSE 230 (H) 01/08/2024    ALKPHOS 45 02/25/2023    K 5.1 01/08/2024    PROT 7.6 02/25/2023     01/08/2024    AST 20 02/25/2023    ALT 21 02/25/2023    BUN 42 (H) 01/08/2024    ANIONGAP 17 01/08/2024    PHOS 3.5 01/08/2024    ALBUMIN 4.4 01/08/2024    GFRMALE 31 (A) 08/22/2023     Lab Results   Component Value Date    TRIG 532 (H) 06/20/2023    CHOL 135 06/20/2023    LDLCALC  02/25/2023     Unable to report calculated LDL due to increased triglycerides. Direct    HDL 35.1 (A)  06/20/2023     Lab Results   Component Value Date    HGBA1C 8.0 (H) 01/08/2024       Current Outpatient Medications on File Prior to Visit   Medication Sig Dispense Refill    metFORMIN  mg 24 hr tablet Take 2 tablets (1,000 mg) by mouth 2 times a day. (Patient taking differently: Take 1 tablet (500 mg) by mouth 2 times a day.) 400 tablet 0    atenolol (Tenormin) 50 mg tablet Take 1 tablet (50 mg) by mouth once daily. 100 tablet 0    dulaglutide (Trulicity) 4.5 mg/0.5 mL pen injector Inject 4.5 mg under the skin 1 (one) time per week. 6 mL 1    DULoxetine (Cymbalta) 60 mg DR capsule Take 1 capsule (60 mg) by mouth once daily. 100 capsule 1    empagliflozin (Jardiance) 25 mg Take 1 tablet (25 mg) by mouth once daily. 90 tablet 1    ezetimibe (Zetia) 10 mg tablet TAKE 1 TABLET BY MOUTH  DAILY 90 tablet 3    fenofibrate (Triglide) 160 mg tablet Take 1 tablet (160 mg) by mouth once daily. 90 tablet 3    fluticasone-umeclidin-vilanter (Trelegy Ellipta) 200-62.5-25 mcg blister with device Inhale 1 puff once daily in the morning. Take before meals. 60 each 2    glimepiride (Amaryl) 2 mg tablet TAKE 1 TABLET BY MOUTH  TWICE DAILY 200 tablet 2    lisinopril 40 mg tablet Take 1 tablet (40 mg) by mouth once daily. 100 tablet 3    montelukast (Singulair) 10 mg tablet Take 1 tablet (10 mg) by mouth once daily at bedtime. 100 tablet 3    multivitamin-iron-folic acid (Centrum Complete)  mg-mcg tablet tablet Take 1 tablet by mouth once daily.      pravastatin (Pravachol) 40 mg tablet TAKE 1 TABLET BY MOUTH ONCE  DAILY 90 tablet 3    [DISCONTINUED] atenolol (Tenormin) 50 mg tablet Take 1 tablet (50 mg) by mouth once daily. 100 tablet 0     No current facility-administered medications on file prior to visit.        Assessment/Plan   Problem List Items Addressed This Visit             ICD-10-CM    COPD, mild (CMS/HCC) J44.9     Stable. A little hoarse since starting Trelegy, but not bothersome.         Relevant Orders     Follow Up In [facility] - Pharmacy    Diabetes mellitus (CMS/[facility]) E11.9     Patient's diabetes needs improvement with most recent A1c of 8% (Goal < 7%) on 1/8/24.   No blood glucose readings available to assess effect of medication changes made last visit.   Compliance at present is estimated to be fair.   Follow-up: 3/12/24 at 10:30 am  PCP Follow-Up: 2/28/24         Relevant Orders    Follow Up In [facility] - Pharmacy     Linh Carolina PharmD    Continue all meds under the continuation of care with the referring provider and clinical pharmacy team.    Verbal consent to manage patient's drug therapy was obtained from the patient. They were informed they may decline to participate or withdraw from participation in pharmacy services at any time.

## 2024-02-14 ENCOUNTER — APPOINTMENT (OUTPATIENT)
Dept: RESPIRATORY THERAPY | Facility: HOSPITAL | Age: 74
End: 2024-02-14

## 2024-02-22 ENCOUNTER — OFFICE VISIT (OUTPATIENT)
Dept: OPHTHALMOLOGY | Facility: CLINIC | Age: 74
End: 2024-02-22
Payer: MEDICARE

## 2024-02-22 DIAGNOSIS — H40.013 OPEN ANGLE WITH BORDERLINE FINDINGS, LOW RISK, BILATERAL: Primary | ICD-10-CM

## 2024-02-22 DIAGNOSIS — E11.9 TYPE 2 DIABETES MELLITUS WITHOUT COMPLICATION, WITHOUT LONG-TERM CURRENT USE OF INSULIN (MULTI): ICD-10-CM

## 2024-02-22 DIAGNOSIS — H52.7 UNSPECIFIED DISORDER OF REFRACTION: ICD-10-CM

## 2024-02-22 DIAGNOSIS — H25.813 COMBINED FORM OF SENILE CATARACT OF BOTH EYES: ICD-10-CM

## 2024-02-22 PROCEDURE — 1126F AMNT PAIN NOTED NONE PRSNT: CPT | Performed by: OPHTHALMOLOGY

## 2024-02-22 PROCEDURE — 3052F HG A1C>EQUAL 8.0%<EQUAL 9.0%: CPT | Performed by: OPHTHALMOLOGY

## 2024-02-22 PROCEDURE — 92015 DETERMINE REFRACTIVE STATE: CPT | Performed by: OPHTHALMOLOGY

## 2024-02-22 PROCEDURE — 1036F TOBACCO NON-USER: CPT | Performed by: OPHTHALMOLOGY

## 2024-02-22 PROCEDURE — 3060F POS MICROALBUMINURIA REV: CPT | Performed by: OPHTHALMOLOGY

## 2024-02-22 PROCEDURE — 1160F RVW MEDS BY RX/DR IN RCRD: CPT | Performed by: OPHTHALMOLOGY

## 2024-02-22 PROCEDURE — 1159F MED LIST DOCD IN RCRD: CPT | Performed by: OPHTHALMOLOGY

## 2024-02-22 PROCEDURE — 1123F ACP DISCUSS/DSCN MKR DOCD: CPT | Performed by: OPHTHALMOLOGY

## 2024-02-22 PROCEDURE — 99214 OFFICE O/P EST MOD 30 MIN: CPT | Performed by: OPHTHALMOLOGY

## 2024-02-22 PROCEDURE — 92133 CPTRZD OPH DX IMG PST SGM ON: CPT | Performed by: OPHTHALMOLOGY

## 2024-02-22 PROCEDURE — 4010F ACE/ARB THERAPY RXD/TAKEN: CPT | Performed by: OPHTHALMOLOGY

## 2024-02-22 ASSESSMENT — ENCOUNTER SYMPTOMS
MUSCULOSKELETAL NEGATIVE: 0
GASTROINTESTINAL NEGATIVE: 0
HEMATOLOGIC/LYMPHATIC NEGATIVE: 0
CARDIOVASCULAR NEGATIVE: 0
OCCASIONAL FEELINGS OF UNSTEADINESS: 0
RESPIRATORY NEGATIVE: 0
DEPRESSION: 0
PSYCHIATRIC NEGATIVE: 0
ALLERGIC/IMMUNOLOGIC NEGATIVE: 0
NEUROLOGICAL NEGATIVE: 0
LOSS OF SENSATION IN FEET: 0
EYES NEGATIVE: 0
CONSTITUTIONAL NEGATIVE: 0
ENDOCRINE NEGATIVE: 0

## 2024-02-22 ASSESSMENT — TONOMETRY
OD_IOP_MMHG: 13
OS_IOP_MMHG: 14
IOP_METHOD: GOLDMANN APPLANATION

## 2024-02-22 ASSESSMENT — VISUAL ACUITY
CORRECTION_TYPE: GLASSES
OD_PH_CC+: -1
METHOD: SNELLEN - LINEAR
OS_CC: 20/20
OS_CC+: -2
OD_PH_CC: 20/40
OD_CC: 20/100

## 2024-02-22 ASSESSMENT — REFRACTION_WEARINGRX
OD_CYLINDER: -2.00
OD_SPHERE: +1.50
OS_AXIS: 107
OS_SPHERE: +0.75
OD_AXIS: 85
OS_ADD: 3.00
OD_ADD: 3.00
OS_CYLINDER: -2.50
SPECS_TYPE: PAL

## 2024-02-22 ASSESSMENT — PATIENT HEALTH QUESTIONNAIRE - PHQ9
SUM OF ALL RESPONSES TO PHQ9 QUESTIONS 1 AND 2: 0
1. LITTLE INTEREST OR PLEASURE IN DOING THINGS: NOT AT ALL
2. FEELING DOWN, DEPRESSED OR HOPELESS: NOT AT ALL

## 2024-02-22 ASSESSMENT — REFRACTION_MANIFEST
OS_AXIS: 115
OS_SPHERE: +0.25
METHOD_AUTOREFRACTION: 1
OD_AXIS: 090
OD_CYLINDER: -3.00
OD_SPHERE: +0.75
OS_CYLINDER: -2.25

## 2024-02-22 ASSESSMENT — PAIN SCALES - GENERAL: PAINLEVEL: 0-NO PAIN

## 2024-02-22 ASSESSMENT — PACHYMETRY
OS_CT(UM): 669
OD_CT(UM): 666

## 2024-02-22 ASSESSMENT — CUP TO DISC RATIO
OS_RATIO: 0.5
OD_RATIO: 0.5

## 2024-02-22 ASSESSMENT — SLIT LAMP EXAM - LIDS
COMMENTS: NORMAL
COMMENTS: NORMAL

## 2024-02-22 ASSESSMENT — EXTERNAL EXAM - RIGHT EYE: OD_EXAM: NORMAL

## 2024-02-22 ASSESSMENT — EXTERNAL EXAM - LEFT EYE: OS_EXAM: NORMAL

## 2024-02-22 NOTE — ASSESSMENT & PLAN NOTE
Relatively stable OCT with time, good IOP and very thick corneas. Still suspect low risk overall, will continue with annual OCT nerve.

## 2024-02-22 NOTE — PATIENT INSTRUCTIONS
Thank you so much for choosing me to provide your care today!    If you were dilated your vision may remain blurry   or light sensitive for several hours.    The nature of eye and vision problems can require frequent follow up, please make every effort to adhere to any future appointments.    If you have any issues, questions, or concerns,   please do not hesitate to reach out.    If you receive a survey in regards to your care today, please mention any exceptional care my office staff and/or technicians provided.    You can reach our office at this number:  [phone]

## 2024-02-22 NOTE — LETTER
March 5, 2024    Jeremías Encinas DO  [address]  [address]  [facility] [address]    Patient: Derrek Messina   YOB: 1950   Date of Visit: 2/22/2024       Dear Dr. Jeremías Encinas, :    Thank you for referring Derrek Messina to me for evaluation. Here is my assessment and plan of care:    Assessment/Plan:  Derrek was seen today for annual exam.  Diagnoses and all orders for this visit:  Open angle with borderline findings, low risk, bilateral (Primary)  -     OCT, Optic Nerve - OU - Both Eyes  -     OCT, Optic Nerve - OU - Both Eyes; Future  Type 2 diabetes mellitus without complication, without long-term current use of insulin (CMS/[facility])  Combined form of senile cataract of both eyes  Unspecified disorder of refraction    Right eye:     Left eye:    [x] No diabetes mellitus (DM) retinopathy [x] No diabetes mellitus (DM) retinopathy    [] Mild non-proliferative retinopathy [] Mild non-proliferative retinopathy    [] Moderate non-proliferative retinopathy [] Moderate non-proliferative retinopathy    [] Severe non-proliferative retinopathy [] Severe non-proliferative retinopathy    [] Proliferative retinopathy   [] Proliferative retinopathy    [] Diabetic macular edema   [] Diabetic macular edema    Urged patient to continue to work on best blood sugar and blood pressure control  Advised patient to call if any new vision changes noted    Will plan to repeat evaluation in:   [] 3 months [] 6 months [] 9 months [x] 12 months [] Other    Below you can find relevant pieces of the exam. If you have questions, please do not hesitate to call me. I look forward to following Derrek along with you.         Sincerely,        Magdi Call MD        CC:   No Recipients         External Exam         Right Left    External Normal Normal              Slit Lamp Exam         Right Left    Lids/Lashes Normal Normal    Conjunctiva/Sclera White and quiet White and quiet    Cornea Clear Clear    Anterior  "Chamber Deep and quiet Deep and quiet    Iris Round and reactive Round and reactive    Lens 1+ Nuclear sclerosis, 1+ Posterior subcapsular cataract 1+ Nuclear sclerosis, 1+ Posterior subcapsular cataract              Fundus Exam         Right Left    Vitreous Normal Normal    Disc Normal Normal    C/D Ratio 0.5 0.5    Macula Normal Normal    Vessels Normal Normal    Periphery Normal Normal                   <div id=\"[email or web address]\"></div>     "

## 2024-02-22 NOTE — PROGRESS NOTES
Assessment/Plan   Problem List Items Addressed This Visit          Eye/Vision problems    Diabetes mellitus (CMS/HCC)     Advised no signs of diabetic changes on exam. Recommend to continue best sugar control and on need for annual checkup. Understands role of good BP control and weight loss if applicable. Discussed some components of selected studies like WESDR, DCCT, and UKPDS to describe the likely course of diabetes and effects on the eyes.           Open angle with borderline findings, low risk, bilateral - Primary     Relatively stable OCT with time, good IOP and very thick corneas. Still suspect low risk overall, will continue with annual OCT nerve.          Relevant Orders    OCT, Optic Nerve - OU - Both Eyes (Completed)    OCT, Optic Nerve - OU - Both Eyes    Unspecified disorder of refraction     Discussed glasses prescription from refraction. Will provide if patient interested in keeping for records or to fill as a new set of glasses.            Combined form of senile cataract of both eyes     Non significant cataract noted on exam. Will plan to continue to monitor with serial exam.               Provided reassurance regarding above diagnoses and care received in the office visit today. Discussed outcomes and options along with the importance of treatment compliance. Understands the importance of any follow up visits. Patient instructed to call/communicate with our office if any new issues, questions, or concerns.     Will plan to see back in 1 year full with OCT nerve or sooner PRN

## 2024-02-22 NOTE — LETTER
February 22, 2024    Jonathan Johnson MD  [address]  [facility] [[email or web address] [address]  [facility] [address]    Patient: Derrek Messina   YOB: 1950   Date of Visit: 2/22/2024       Dear Dr. Jonathan Johnson MD:    Thank you for referring Derrek Messina to me for evaluation. Here is my assessment and plan of care:    Assessment/Plan:  Derrek was seen today for annual exam.  Diagnoses and all orders for this visit:  Open angle with borderline findings, low risk, bilateral (Primary)  -     OCT, Optic Nerve - OU - Both Eyes  -     OCT, Optic Nerve - OU - Both Eyes; Future  Type 2 diabetes mellitus without complication, without long-term current use of insulin (CMS/[facility])  Combined form of senile cataract of both eyes  Unspecified disorder of refraction    Right eye:     Left eye:    [x] No diabetes mellitus (DM) retinopathy [x] No diabetes mellitus (DM) retinopathy    [] Mild non-proliferative retinopathy [] Mild non-proliferative retinopathy    [] Moderate non-proliferative retinopathy [] Moderate non-proliferative retinopathy    [] Severe non-proliferative retinopathy [] Severe non-proliferative retinopathy    [] Proliferative retinopathy   [] Proliferative retinopathy    [] Diabetic macular edema   [] Diabetic macular edema    Urged patient to continue to work on best blood sugar and blood pressure control  Advised patient to call if any new vision changes noted    Will plan to repeat evaluation in:   [] 3 months [] 6 months [] 9 months [x] 12 months [] Other    Below you can find relevant pieces of the exam. If you have questions, please do not hesitate to call me. I look forward to following Derrek along with you.         Sincerely,        Magdi Call MD        CC:   No Recipients         External Exam         Right Left    External Normal Normal              Slit Lamp Exam         Right Left    Lids/Lashes Normal Normal    Conjunctiva/Sclera White and quiet White and quiet     "Cornea Clear Clear    Anterior Chamber Deep and quiet Deep and quiet    Iris Round and reactive Round and reactive    Lens 1+ Nuclear sclerosis, 1+ Posterior subcapsular cataract 1+ Nuclear sclerosis, 1+ Posterior subcapsular cataract              Fundus Exam         Right Left    Vitreous Normal Normal    Disc Normal Normal    C/D Ratio 0.5 0.5    Macula Normal Normal    Vessels Normal Normal    Periphery Normal Normal                   <div id=\"MAIN_EXAM_REVIEWED\"></div>     "

## 2024-02-28 ENCOUNTER — APPOINTMENT (OUTPATIENT)
Dept: PRIMARY CARE | Facility: CLINIC | Age: 74
End: 2024-02-28
Payer: COMMERCIAL

## 2024-02-29 ENCOUNTER — TELEPHONE (OUTPATIENT)
Dept: OPHTHALMOLOGY | Facility: CLINIC | Age: 74
End: 2024-02-29
Payer: COMMERCIAL

## 2024-02-29 NOTE — TELEPHONE ENCOUNTER
Pt would like to know if you can send his exam results to Dr. Jeremías Encinas. He said he is no longer seeing the doctor he had you send original exam to.

## 2024-03-04 ENCOUNTER — HOSPITAL ENCOUNTER (OUTPATIENT)
Dept: RESPIRATORY THERAPY | Facility: HOSPITAL | Age: 74
Setting detail: THERAPIES SERIES
Discharge: HOME | End: 2024-03-04
Payer: COMMERCIAL

## 2024-03-04 DIAGNOSIS — J42 CHRONIC BRONCHITIS, UNSPECIFIED CHRONIC BRONCHITIS TYPE (MULTI): ICD-10-CM

## 2024-03-04 PROCEDURE — 94726 PLETHYSMOGRAPHY LUNG VOLUMES: CPT

## 2024-03-04 PROCEDURE — 94640 AIRWAY INHALATION TREATMENT: CPT

## 2024-03-04 PROCEDURE — 2500000002 HC RX 250 W HCPCS SELF ADMINISTERED DRUGS (ALT 637 FOR MEDICARE OP, ALT 636 FOR OP/ED)

## 2024-03-04 PROCEDURE — RXMED WILLOW AMBULATORY MEDICATION CHARGE

## 2024-03-04 RX ORDER — ALBUTEROL SULFATE 0.83 MG/ML
SOLUTION RESPIRATORY (INHALATION)
Status: COMPLETED
Start: 2024-03-04 | End: 2024-03-04

## 2024-03-04 RX ADMIN — ALBUTEROL SULFATE: 2.5 SOLUTION RESPIRATORY (INHALATION) at 11:30

## 2024-03-06 ENCOUNTER — PHARMACY VISIT (OUTPATIENT)
Dept: PHARMACY | Facility: CLINIC | Age: 74
End: 2024-03-06
Payer: COMMERCIAL

## 2024-03-12 ENCOUNTER — TELEMEDICINE (OUTPATIENT)
Dept: PHARMACY | Facility: HOSPITAL | Age: 74
End: 2024-03-12
Payer: COMMERCIAL

## 2024-03-12 DIAGNOSIS — E11.9 TYPE 2 DIABETES MELLITUS WITHOUT COMPLICATION, WITHOUT LONG-TERM CURRENT USE OF INSULIN (MULTI): ICD-10-CM

## 2024-03-12 NOTE — PROGRESS NOTES
Subjective   Patient ID: Derrek Messina is a 73 y.o. male who presents for Diabetes.    Referring Provider: Jeremías Encinas DO    Diabetes  He presents for his follow-up diabetic visit. He has type 2 diabetes mellitus. There are no hypoglycemic associated symptoms. Current diabetic treatment includes oral agent (triple therapy). He is compliant with treatment all of the time. He is following a generally healthy diet. He participates in exercise three times a week. An ACE inhibitor/angiotensin II receptor blocker is being taken. Eye exam is current.     Breakfast: Multigrain bread or multiwheat English muffin or brown rice a 2-3 times a week; oranges  Dinner: pasta, red meat occasionally, chicken, peas, carrots  Drinks: Water, Diet Pepsi, 2% milk, black coffee    Physical activity includes yardwork. He has been on the treadmill 15-20 minutes at least 3 times a week.    Dexcom working now.   Blood sugar while on the phone: 189-199 mg/dL  14-day Average 279  GMI 10%    Current diabetes medications:  Jardiance 25 mg daily  Trulicity 4.5 mg weekly injection  Metformin  mg 1 tablet twice daily  Glimepiride 2 mg twice daily    Objective     There were no vitals taken for this visit.     Labs  Lab Results   Component Value Date    BILITOT 0.3 02/25/2023    CALCIUM 9.7 01/08/2024    CO2 24 01/08/2024     01/08/2024    CREATININE 2.19 (H) 01/08/2024    GLUCOSE 230 (H) 01/08/2024    ALKPHOS 45 02/25/2023    K 5.1 01/08/2024    PROT 7.6 02/25/2023     01/08/2024    AST 20 02/25/2023    ALT 21 02/25/2023    BUN 42 (H) 01/08/2024    ANIONGAP 17 01/08/2024    PHOS 3.5 01/08/2024    ALBUMIN 4.4 01/08/2024    GFRMALE 31 (A) 08/22/2023     Lab Results   Component Value Date    TRIG 532 (H) 06/20/2023    CHOL 135 06/20/2023    LDLCALC  02/25/2023     Unable to report calculated LDL due to increased triglycerides. Direct    HDL 35.1 (A) 06/20/2023     Lab Results   Component Value Date    HGBA1C 8.0 (H) 01/08/2024        Current Outpatient Medications on File Prior to Visit   Medication Sig Dispense Refill    atenolol (Tenormin) 50 mg tablet Take 1 tablet (50 mg) by mouth once daily. 100 tablet 0    dulaglutide (Trulicity) 4.5 mg/0.5 mL pen injector Inject 4.5 mg under the skin 1 (one) time per week. 6 mL 1    DULoxetine (Cymbalta) 60 mg DR capsule Take 1 capsule (60 mg) by mouth once daily. 100 capsule 1    empagliflozin (Jardiance) 25 mg Take 1 tablet (25 mg) by mouth once daily. 90 tablet 1    ezetimibe (Zetia) 10 mg tablet TAKE 1 TABLET BY MOUTH  DAILY 90 tablet 3    fenofibrate (Triglide) 160 mg tablet Take 1 tablet (160 mg) by mouth once daily. 90 tablet 3    fluticasone-umeclidin-vilanter (Trelegy Ellipta) 200-62.5-25 mcg blister with device Inhale 1 puff once daily in the morning. Take before meals. 60 each 2    glimepiride (Amaryl) 2 mg tablet TAKE 1 TABLET BY MOUTH  TWICE DAILY 200 tablet 2    lisinopril 40 mg tablet Take 1 tablet (40 mg) by mouth once daily. 100 tablet 3    metFORMIN  mg 24 hr tablet Take 2 tablets (1,000 mg) by mouth 2 times a day. (Patient taking differently: Take 1 tablet (500 mg) by mouth 2 times a day.) 400 tablet 0    montelukast (Singulair) 10 mg tablet Take 1 tablet (10 mg) by mouth once daily at bedtime. 100 tablet 3    multivitamin-iron-folic acid (Centrum Complete)  mg-mcg tablet tablet Take 1 tablet by mouth once daily.      pravastatin (Pravachol) 40 mg tablet TAKE 1 TABLET BY MOUTH ONCE  DAILY 90 tablet 3     No current facility-administered medications on file prior to visit.        Assessment/Plan   Problem List Items Addressed This Visit             ICD-10-CM    Diabetes mellitus (CMS/HCC) E11.9     Patient's diabetes needs improvement with most recent A1c of 8% (Goal < 7%) on 1/8/24.   Change Trulicity 4.5 mg to Mounjaro 10 mg if/when insurance allows. Patient still has 7 doses of Trulicity remaining.  14-day average shows uncontrolled blood sugar.  Compliance at  present is estimated to be fair.   Follow-up: 4/9/24 at 10 am  PCP Follow-Up: 4/17/24          Linh Carolina PharmD    Continue all meds under the continuation of care with the referring provider and clinical pharmacy team.    Verbal consent to manage patient's drug therapy was obtained from the patient. They were informed they may decline to participate or withdraw from participation in pharmacy services at any time.

## 2024-03-12 NOTE — ASSESSMENT & PLAN NOTE
Patient's diabetes needs improvement with most recent A1c of 8% (Goal < 7%) on 1/8/24.   Change Trulicity 4.5 mg to Mounjaro 10 mg if/when insurance allows. Patient still has 7 doses of Trulicity remaining.  14-day average shows uncontrolled blood sugar.  Compliance at present is estimated to be fair.   Follow-up: 4/9/24 at 10 am  PCP Follow-Up: 4/17/24

## 2024-03-27 DIAGNOSIS — E11.9 TYPE 2 DIABETES MELLITUS WITHOUT COMPLICATION, WITHOUT LONG-TERM CURRENT USE OF INSULIN (MULTI): ICD-10-CM

## 2024-03-27 RX ORDER — DULAGLUTIDE 4.5 MG/.5ML
4.5 INJECTION, SOLUTION SUBCUTANEOUS
Qty: 6 ML | Refills: 1 | OUTPATIENT
Start: 2024-03-27

## 2024-04-03 DIAGNOSIS — J42 CHRONIC BRONCHITIS, UNSPECIFIED CHRONIC BRONCHITIS TYPE (MULTI): ICD-10-CM

## 2024-04-03 DIAGNOSIS — J44.9 CHRONIC OBSTRUCTIVE PULMONARY DISEASE, UNSPECIFIED COPD TYPE (MULTI): ICD-10-CM

## 2024-04-09 ENCOUNTER — TELEMEDICINE (OUTPATIENT)
Dept: PHARMACY | Facility: HOSPITAL | Age: 74
End: 2024-04-09
Payer: COMMERCIAL

## 2024-04-09 DIAGNOSIS — N18.4 CHRONIC RENAL DISEASE, STAGE IV (MULTI): ICD-10-CM

## 2024-04-09 DIAGNOSIS — E11.9 TYPE 2 DIABETES MELLITUS WITHOUT COMPLICATION, WITHOUT LONG-TERM CURRENT USE OF INSULIN (MULTI): ICD-10-CM

## 2024-04-09 PROCEDURE — RXMED WILLOW AMBULATORY MEDICATION CHARGE

## 2024-04-09 RX ORDER — TIRZEPATIDE 7.5 MG/.5ML
7.5 INJECTION, SOLUTION SUBCUTANEOUS
Qty: 2 ML | Refills: 0 | Status: SHIPPED | OUTPATIENT
Start: 2024-04-09 | End: 2024-04-30 | Stop reason: DRUGHIGH

## 2024-04-09 NOTE — ASSESSMENT & PLAN NOTE
Patient's diabetes needs improvement with most recent A1c of 8% (Goal < 7%) on 1/8/24. 14-day average and GMI show blood sugars continue to be uncontrolled, although slightly improved since last visit.  Change Trulicity 4.5 mg to Mounjaro 7.5 mg weekly. Will finish last 2 doses of Trulicity before starting Mounjaro (first dose 4/29/24).  Continue Jardiance 25 mg daily, metformin  mg twice daily, and glimepiride 2 mg twice daily.  Compliance at present is estimated to be fair.   Follow-up: 5/14/24 at 10 am  PCP Follow-Up: 4/11/24

## 2024-04-09 NOTE — PROGRESS NOTES
Subjective   Patient ID: Derrek Messina is a 74 y.o. male who presents for Diabetes.    Referring Provider: Jeremías Encinas DO    Diabetes  He presents for his follow-up diabetic visit. He has type 2 diabetes mellitus. There are no hypoglycemic associated symptoms. Current diabetic treatment includes oral agent (triple therapy). He is compliant with treatment all of the time. He is following a generally healthy diet. He participates in exercise three times a week. An ACE inhibitor/angiotensin II receptor blocker is being taken. Eye exam is current.     Breakfast: Multigrain bread or multiwheat English muffin or brown rice a 2-3 times a week; oranges  Dinner: pasta, red meat occasionally, chicken, peas, carrots, celery  Drinks: Water, Diet Pepsi occasionally, 2% milk, black coffee    Physical activity includes yardwork. He has been on the treadmill 15-20 minutes at least 3 times a week.    Blood sugars:  Usually below 200 upon waking.   14-day Average: 232 mg/dL  GMI 8.6%    Current diabetes medications:  Jardiance 25 mg daily  Trulicity 4.5 mg weekly injection on Mondays  Metformin  mg 1 tablet twice daily  Glimepiride 2 mg twice daily    Objective     There were no vitals taken for this visit.     Labs  Lab Results   Component Value Date    BILITOT 0.3 02/25/2023    CALCIUM 9.7 01/08/2024    CO2 24 01/08/2024     01/08/2024    CREATININE 2.19 (H) 01/08/2024    GLUCOSE 230 (H) 01/08/2024    ALKPHOS 45 02/25/2023    K 5.1 01/08/2024    PROT 7.6 02/25/2023     01/08/2024    AST 20 02/25/2023    ALT 21 02/25/2023    BUN 42 (H) 01/08/2024    ANIONGAP 17 01/08/2024    PHOS 3.5 01/08/2024    ALBUMIN 4.4 01/08/2024    GFRMALE 31 (A) 08/22/2023     Lab Results   Component Value Date    TRIG 532 (H) 06/20/2023    CHOL 135 06/20/2023    LDLCALC  02/25/2023     Unable to report calculated LDL due to increased triglycerides. Direct    HDL 35.1 (A) 06/20/2023     Lab Results   Component Value Date    BA1C  8.0 (H) 01/08/2024       Current Outpatient Medications on File Prior to Visit   Medication Sig Dispense Refill    atenolol (Tenormin) 50 mg tablet Take 1 tablet (50 mg) by mouth once daily. 100 tablet 0    DULoxetine (Cymbalta) 60 mg DR capsule Take 1 capsule (60 mg) by mouth once daily. 100 capsule 1    ezetimibe (Zetia) 10 mg tablet TAKE 1 TABLET BY MOUTH  DAILY 90 tablet 3    fenofibrate (Triglide) 160 mg tablet Take 1 tablet (160 mg) by mouth once daily. 90 tablet 3    fluticasone-umeclidin-vilanter (Trelegy Ellipta) 200-62.5-25 mcg blister with device Inhale 1 puff once daily in the morning. Take before meals. 60 each 2    glimepiride (Amaryl) 2 mg tablet TAKE 1 TABLET BY MOUTH  TWICE DAILY 200 tablet 2    lisinopril 40 mg tablet Take 1 tablet (40 mg) by mouth once daily. 100 tablet 3    metFORMIN  mg 24 hr tablet Take 2 tablets (1,000 mg) by mouth 2 times a day. (Patient taking differently: Take 1 tablet (500 mg) by mouth 2 times a day.) 400 tablet 0    montelukast (Singulair) 10 mg tablet Take 1 tablet (10 mg) by mouth once daily at bedtime. 100 tablet 3    multivitamin-iron-folic acid (Centrum Complete)  mg-mcg tablet tablet Take 1 tablet by mouth once daily.      pravastatin (Pravachol) 40 mg tablet TAKE 1 TABLET BY MOUTH ONCE  DAILY 90 tablet 3    [DISCONTINUED] dulaglutide (Trulicity) 4.5 mg/0.5 mL pen injector Inject 4.5 mg under the skin 1 (one) time per week. 6 mL 1    [DISCONTINUED] empagliflozin (Jardiance) 25 mg Take 1 tablet (25 mg) by mouth once daily. 90 tablet 1     No current facility-administered medications on file prior to visit.        Assessment/Plan   Problem List Items Addressed This Visit             ICD-10-CM    Diabetes mellitus (CMS/HCC) E11.9     Patient's diabetes needs improvement with most recent A1c of 8% (Goal < 7%) on 1/8/24. 14-day average and GMI show blood sugars continue to be uncontrolled, although slightly improved since last visit.  Change Trulicity 4.5 mg  to Mounjaro 7.5 mg weekly. Will finish last 2 doses of Trulicity before starting Mounjaro (first dose 4/29/24).  Continue Jardiance 25 mg daily, metformin  mg twice daily, and glimepiride 2 mg twice daily.  Compliance at present is estimated to be fair.   Follow-up: 5/14/24 at 10 am  PCP Follow-Up: 4/11/24         Relevant Medications    empagliflozin (Jardiance) 25 mg    tirzepatide (Mounjaro) 7.5 mg/0.5 mL pen injector    Other Relevant Orders    Follow Up In [facility] - Pharmacy     Other Visit Diagnoses         Codes    Chronic renal disease, stage IV (CMS/[facility])     N18.4    Relevant Medications    empagliflozin (Jardiance) 25 mg          Iqra ChanD    Continue all meds under the continuation of care with the referring provider and clinical pharmacy team.    Verbal consent to manage patient's drug therapy was obtained from the patient. They were informed they may decline to participate or withdraw from participation in pharmacy services at any time.

## 2024-04-10 ENCOUNTER — PHARMACY VISIT (OUTPATIENT)
Dept: PHARMACY | Facility: CLINIC | Age: 74
End: 2024-04-10
Payer: COMMERCIAL

## 2024-04-11 ENCOUNTER — OFFICE VISIT (OUTPATIENT)
Dept: PRIMARY CARE | Facility: CLINIC | Age: 74
End: 2024-04-11
Payer: COMMERCIAL

## 2024-04-11 ENCOUNTER — PHARMACY VISIT (OUTPATIENT)
Dept: PHARMACY | Facility: CLINIC | Age: 74
End: 2024-04-11

## 2024-04-11 ENCOUNTER — LAB (OUTPATIENT)
Dept: LAB | Facility: LAB | Age: 74
End: 2024-04-11
Payer: COMMERCIAL

## 2024-04-11 VITALS
DIASTOLIC BLOOD PRESSURE: 75 MMHG | HEART RATE: 66 BPM | BODY MASS INDEX: 34.07 KG/M2 | SYSTOLIC BLOOD PRESSURE: 127 MMHG | OXYGEN SATURATION: 98 % | HEIGHT: 70 IN | WEIGHT: 238 LBS

## 2024-04-11 DIAGNOSIS — Z13.6 ENCOUNTER FOR ABDOMINAL AORTIC ANEURYSM (AAA) SCREENING: ICD-10-CM

## 2024-04-11 DIAGNOSIS — J44.9 CHRONIC OBSTRUCTIVE PULMONARY DISEASE, UNSPECIFIED COPD TYPE (MULTI): ICD-10-CM

## 2024-04-11 DIAGNOSIS — N18.30 CKD STAGE 3 SECONDARY TO DIABETES (MULTI): ICD-10-CM

## 2024-04-11 DIAGNOSIS — N18.4 CHRONIC RENAL DISEASE, STAGE IV (MULTI): ICD-10-CM

## 2024-04-11 DIAGNOSIS — Z00.00 ROUTINE GENERAL MEDICAL EXAMINATION AT HEALTH CARE FACILITY: ICD-10-CM

## 2024-04-11 DIAGNOSIS — Z23 NEEDS FLU SHOT: ICD-10-CM

## 2024-04-11 DIAGNOSIS — E11.22 CKD STAGE 3 SECONDARY TO DIABETES (MULTI): ICD-10-CM

## 2024-04-11 DIAGNOSIS — R05.3 CHRONIC COUGH: ICD-10-CM

## 2024-04-11 DIAGNOSIS — J44.9 COPD, MILD (MULTI): ICD-10-CM

## 2024-04-11 DIAGNOSIS — I10 BENIGN ESSENTIAL HYPERTENSION: ICD-10-CM

## 2024-04-11 DIAGNOSIS — E78.49 FAMILIAL COMBINED HYPERLIPIDEMIA: ICD-10-CM

## 2024-04-11 DIAGNOSIS — Z23 NEED FOR PROPHYLACTIC VACCINATION AGAINST DIPHTHERIA AND TETANUS: ICD-10-CM

## 2024-04-11 DIAGNOSIS — Z00.00 ROUTINE GENERAL MEDICAL EXAMINATION AT HEALTH CARE FACILITY: Primary | ICD-10-CM

## 2024-04-11 DIAGNOSIS — E66.09 CLASS 1 OBESITY DUE TO EXCESS CALORIES WITH BODY MASS INDEX (BMI) OF 34.0 TO 34.9 IN ADULT, UNSPECIFIED WHETHER SERIOUS COMORBIDITY PRESENT: ICD-10-CM

## 2024-04-11 DIAGNOSIS — J42 CHRONIC BRONCHITIS, UNSPECIFIED CHRONIC BRONCHITIS TYPE (MULTI): ICD-10-CM

## 2024-04-11 DIAGNOSIS — E11.9 TYPE 2 DIABETES MELLITUS WITHOUT COMPLICATION, WITHOUT LONG-TERM CURRENT USE OF INSULIN (MULTI): ICD-10-CM

## 2024-04-11 DIAGNOSIS — E66.01 MORBID (SEVERE) OBESITY DUE TO EXCESS CALORIES (MULTI): ICD-10-CM

## 2024-04-11 DIAGNOSIS — Z12.5 SCREENING FOR PROSTATE CANCER: ICD-10-CM

## 2024-04-11 DIAGNOSIS — R93.1 AGATSTON CORONARY ARTERY CALCIUM SCORE GREATER THAN 400: ICD-10-CM

## 2024-04-11 LAB
ALBUMIN SERPL BCP-MCNC: 4.2 G/DL (ref 3.4–5)
ANION GAP SERPL CALC-SCNC: 18 MMOL/L (ref 10–20)
BUN SERPL-MCNC: 44 MG/DL (ref 6–23)
CALCIUM SERPL-MCNC: 10.1 MG/DL (ref 8.6–10.6)
CHLORIDE SERPL-SCNC: 104 MMOL/L (ref 98–107)
CHOLEST SERPL-MCNC: 181 MG/DL (ref 0–199)
CHOLESTEROL/HDL RATIO: 5.3
CO2 SERPL-SCNC: 22 MMOL/L (ref 21–32)
CREAT SERPL-MCNC: 2.43 MG/DL (ref 0.5–1.3)
EGFRCR SERPLBLD CKD-EPI 2021: 27 ML/MIN/1.73M*2
GLUCOSE SERPL-MCNC: 177 MG/DL (ref 74–99)
HDLC SERPL-MCNC: 34.2 MG/DL
LDLC SERPL CALC-MCNC: ABNORMAL MG/DL
NON HDL CHOLESTEROL: 147 MG/DL (ref 0–149)
PHOSPHATE SERPL-MCNC: 4.4 MG/DL (ref 2.5–4.9)
POTASSIUM SERPL-SCNC: 5.7 MMOL/L (ref 3.5–5.3)
PSA SERPL-MCNC: 0.75 NG/ML
SODIUM SERPL-SCNC: 138 MMOL/L (ref 136–145)
TRIGL SERPL-MCNC: 762 MG/DL (ref 0–149)
VLDL: ABNORMAL

## 2024-04-11 PROCEDURE — 80061 LIPID PANEL: CPT

## 2024-04-11 PROCEDURE — 36415 COLL VENOUS BLD VENIPUNCTURE: CPT

## 2024-04-11 PROCEDURE — 4010F ACE/ARB THERAPY RXD/TAKEN: CPT | Performed by: INTERNAL MEDICINE

## 2024-04-11 PROCEDURE — 3052F HG A1C>EQUAL 8.0%<EQUAL 9.0%: CPT | Performed by: INTERNAL MEDICINE

## 2024-04-11 PROCEDURE — 1170F FXNL STATUS ASSESSED: CPT | Performed by: INTERNAL MEDICINE

## 2024-04-11 PROCEDURE — G0439 PPPS, SUBSEQ VISIT: HCPCS | Performed by: INTERNAL MEDICINE

## 2024-04-11 PROCEDURE — 99397 PER PM REEVAL EST PAT 65+ YR: CPT | Performed by: INTERNAL MEDICINE

## 2024-04-11 PROCEDURE — 1123F ACP DISCUSS/DSCN MKR DOCD: CPT | Performed by: INTERNAL MEDICINE

## 2024-04-11 PROCEDURE — 90471 IMMUNIZATION ADMIN: CPT | Performed by: INTERNAL MEDICINE

## 2024-04-11 PROCEDURE — 80069 RENAL FUNCTION PANEL: CPT

## 2024-04-11 PROCEDURE — G0103 PSA SCREENING: HCPCS

## 2024-04-11 PROCEDURE — 1159F MED LIST DOCD IN RCRD: CPT | Performed by: INTERNAL MEDICINE

## 2024-04-11 PROCEDURE — 90715 TDAP VACCINE 7 YRS/> IM: CPT | Performed by: INTERNAL MEDICINE

## 2024-04-11 PROCEDURE — 82570 ASSAY OF URINE CREATININE: CPT

## 2024-04-11 PROCEDURE — 3008F BODY MASS INDEX DOCD: CPT | Performed by: INTERNAL MEDICINE

## 2024-04-11 PROCEDURE — 3078F DIAST BP <80 MM HG: CPT | Performed by: INTERNAL MEDICINE

## 2024-04-11 PROCEDURE — 1160F RVW MEDS BY RX/DR IN RCRD: CPT | Performed by: INTERNAL MEDICINE

## 2024-04-11 PROCEDURE — 82043 UR ALBUMIN QUANTITATIVE: CPT

## 2024-04-11 PROCEDURE — 1036F TOBACCO NON-USER: CPT | Performed by: INTERNAL MEDICINE

## 2024-04-11 PROCEDURE — 3074F SYST BP LT 130 MM HG: CPT | Performed by: INTERNAL MEDICINE

## 2024-04-11 PROCEDURE — 3060F POS MICROALBUMINURIA REV: CPT | Performed by: INTERNAL MEDICINE

## 2024-04-11 ASSESSMENT — ACTIVITIES OF DAILY LIVING (ADL)
DRESSING: INDEPENDENT
DOING_HOUSEWORK: INDEPENDENT
BATHING: INDEPENDENT
GROCERY_SHOPPING: INDEPENDENT
MANAGING_FINANCES: INDEPENDENT
TAKING_MEDICATION: INDEPENDENT

## 2024-04-11 ASSESSMENT — PATIENT HEALTH QUESTIONNAIRE - PHQ9
SUM OF ALL RESPONSES TO PHQ9 QUESTIONS 1 AND 2: 0
2. FEELING DOWN, DEPRESSED OR HOPELESS: NOT AT ALL
1. LITTLE INTEREST OR PLEASURE IN DOING THINGS: NOT AT ALL

## 2024-04-11 NOTE — ASSESSMENT & PLAN NOTE
Patient to start Mounjaro 7.5.  Will need further titration.  Advise low carbohydrate Mediterranean diet

## 2024-04-11 NOTE — ASSESSMENT & PLAN NOTE
Patient recently switched to Mounjaro.  Will need to titrate up further.  Encourage low carbohydrate diet as well as Mediterranean diet.  Exercise as tolerated

## 2024-04-11 NOTE — ASSESSMENT & PLAN NOTE
Continue fenofibrate Zetia and statin.  Check LDL.  Advised Mediterranean diet and weight reduction

## 2024-04-11 NOTE — PROGRESS NOTES
Subjective   Patient ID: Derrek Messina is a 74 y.o. male who presents for Medicare Annual Wellness Visit Subsequent.    Patient Care Team:  Jeremías Encinas DO as PCP - [employer] PCP       HPI     Patient is a 74-year-old male with past medical history of obstructive sleep apnea diabetes mellitus type 2 hypertension dyslipidemia and asymptomatic coronary artery disease chronic kidney disease stage III who presents for Medicare wellness     Patient with obstructive sleep apnea following with ENT.  Compliant with treatment     Diabetes mellitus type 2. [id number] last visit . He is compliant with medications and takes his metformin and Trulicity as well as glimepiride. He denies increased thirst or increased urination.   No numbness or tingling of the extremities.  He is to check kidney function to ensure not below 30 while on metformin.  Last A1c elevated.  He is following with pharmacist for medication titration.       Hypertension.  Currently well controlled on current medications denies headache changes in vision orthopnea.  Reports compliance with his medications.     Dyslipidemia with elevated triglycerides. Due for LDL recheck and reevaluation of the triglyceride levels.     Asym CAD. Following with cards.      COPD. Not smoking. Occasional SOB on exertion but overall stable. No recent exacerbation He denies any wheezing. Most recent PFTs showing mild to moderate obstruction patient is currently taking Breo and albuterol.      CKD 3 with proteinuria. Pt on lisinopril 40mg and SGLT2     B/L renal cysts with hx of nephrolithiasis. Following with nephro.  Recent ultrasound of the kidneys shows stability of the cysts        Review of Systems  Constitutional: No fever or chills, No Night Sweats  Eyes: No Blurry Vision or Eye sight problems  ENT: No Nasal Discharge, Hoarseness, sore throat  Cardiovascular: no chest pain, no palpitations and no syncope.   Respiratory: no cough, no shortness of breath  "during exertion and no shortness of breath at rest.   Gastrointestinal: no abdominal pain, no nausea and no vomiting.   : No issues with urinary stream, burning with urination, no blood in urine or stools  Skin: No Skin rashes or Lesions  Neuro: No Headache, no dizziness or Numbness or tingling  Psych: No Anxiety, depression or sleeping problems  Heme: No Easy bleeding or brusing.     Objective   /75   Pulse 66   Ht 1.778 m (5' 10\")   Wt 108 kg (238 lb)   SpO2 98%   BMI 34.15 kg/m²     Physical Exam  Constitutional: Alert and in no acute distress. Well developed, well nourished.   Head and Face: Head and face: Normal.    Eyes: Normal external exam. Pupils were equal in size, round, reactive to light (PERRL) with normal accommodation and extraocular movements intact (EOMI).   Ears, Nose, Mouth, and Throat: External inspection of ears and nose: Normal.  Hearing: Normal.  Nasal mucosa, septum, and turbinates: Normal.  Lips, teeth, and gums: Normal.  Oropharynx: Normal.   Neck: No neck mass was observed. Supple. Thyroid not enlarged and there were no palpable thyroid nodules.   Cardiovascular: Heart rate and rhythm were normal, normal S1 and S2. Pedal pulses: Normal. No peripheral edema.   Pulmonary: No respiratory distress. Clear bilateral breath sounds.   Abdomen: Soft nontender; no abdominal mass palpated. Normal bowel sounds. No organomegaly.   : Deferred  Musculoskeletal: No joint swelling seen, normal movements of all extremities. Range of motion: Normal.  Muscle strength/tone: Normal.    Skin: Normal skin color and pigmentation, normal skin turgor, and no rash.   Neurologic: Deep tendon reflexes were 2+ and symmetric.   Psychiatric: Judgment and insight: Intact. Mood and affect: Normal.  Lymphatic: No cervical lymphadenopathy. Palpation of lymph nodes in axillae: Normal.  Palpation of lymph nodes in groin: Normal.    Lab Results   Component Value Date    WBC 7.6 06/20/2023    HGB 15.2 06/20/2023    " HCT 48.2 06/20/2023     06/20/2023    CHOL 135 06/20/2023    TRIG 532 (H) 06/20/2023    HDL 35.1 (A) 06/20/2023    LDLDIRECT 47 (L) 02/25/2023    ALT 21 02/25/2023    AST 20 02/25/2023     01/08/2024    K 5.1 01/08/2024     01/08/2024    CREATININE 2.19 (H) 01/08/2024    BUN 42 (H) 01/08/2024    CO2 24 01/08/2024    TSH 1.72 06/10/2021    PSA 0.7 02/25/2023    HGBA1C 8.0 (H) 01/08/2024    ALBUR 259 (H) 02/25/2023       Complete Pulmonary Function Test Pre/Post Bronchodialator (Spirometry Pre/Post/DLCO/Lung Volumes)  Pulmonary function test    Findings:    Reduced FEV1 to FVC ratio noted.  FEV1 is 2.25 L which is 73% of predicted   normal.    The shape of the flow-volume loop suggest obstruction.    Total lung capacity is 5.92 L which is 83% of predicted normal.  RV/TLC   ratio is elevated at 44%.    Normal DLCO noted.  DLCO is 23.38 ml/min/mmHg which is 92% of predicted    Impression    Moderate obstructive ventilatory defect with preserved diffusion capacity.    James Tipton MD  Pulmonary/ Medicine  [facility]      Assessment/Plan   Problem List Items Addressed This Visit             ICD-10-CM    Chronic bronchitis, unspecified chronic bronchitis type (CMS/HCC) J42     Controlled on current medications.  No medication adjustments         CKD stage 3 secondary to diabetes (CMS/HCC) E11.22, N18.30    Relevant Orders    Tdap vaccine, age 7 years and older    Renal function panel    Albumin , Urine Random    Lipid Panel    Follow Up In [facility] - PCP - Established    Morbid (severe) obesity due to excess calories (CMS/HCC) E66.01     Patient to start Mounjaro 7.5.  Will need further titration.  Advise low carbohydrate Mediterranean diet         Agatston coronary artery calcium score greater than 400 R93.1     Continue with primary prevention.  Continue statin as well as blood pressure and diabetic management.  Needs better diabetic control         Benign essential  hypertension I10     Controlled on current medications.  No medication adjustments         COPD, mild (CMS/HCC) J44.9    Diabetes mellitus (CMS/HCC) E11.9     Patient recently switched to Mounjaro.  Will need to titrate up further.  Encourage low carbohydrate diet as well as Mediterranean diet.  Exercise as tolerated         Relevant Orders    Tdap vaccine, age 7 years and older    Renal function panel    Albumin , Urine Random    Lipid Panel    Follow Up In [facility] - PCP - Established    Familial combined hyperlipidemia E78.49     Continue fenofibrate Zetia and statin.  Check LDL.  Advised Mediterranean diet and weight reduction         Cough R05.9     Other Visit Diagnoses         Codes    Routine general medical examination at health care facility    -  Primary Z00.00    Relevant Orders    Tdap vaccine, age 7 years and older    Renal function panel    Albumin , Urine Random    Lipid Panel    Chronic renal disease, stage IV (CMS/HCC)     N18.4    Needs flu shot     Z23    Encounter for abdominal aortic aneurysm (AAA) screening     Z13.6    Chronic obstructive pulmonary disease, unspecified COPD type (CMS/HCC)     J44.9    Need for prophylactic vaccination against diphtheria and tetanus     Z23    Relevant Orders    Tdap vaccine, age 7 years and older    Class 1 obesity due to excess calories with body mass index (BMI) of 34.0 to 34.9 in adult, unspecified whether serious comorbidity present     E66.09, Z68.34    Screening for prostate cancer     Z12.5    Relevant Orders    Prostate Specific Antigen, Screen          No change in cognition.  Able to perform his activities of daily living without difficulty.    Dear Derrek Messina     It was my pleasure to take care of you today in the office. Below are the things we discussed today:    1. Immunizations: Yearly Flu shot is recommended.          a: COVID: Up-to-date         b: Tetanus: Ordered today         c: Shingrix: Up-to-date         d:  Pneumovax: Up-to-date         e: Prevnar: Up-to-date    2. Blood Work: Ordered  3. Seen your dentist twice a year  4. Yearly Eye exam is recommended    5. BMI: 34.2  6: Diet recommendations:   Eat Clean, Try to have as many home cooked meals as possible  Avoid processed foods which contain excess calories, sugar, and sodium.    7. Exercise recommendations:   150 minutes a week to maintain your weight     If you have to loose weight, you need a better diet and exercise plan.     8. Please get your Living will / Advance directive completed if you do not have one already. Please make sure our office has a copy of the latest one.     9. Colonoscopy: Uptodate  10. PSA: Ordered    11.     Follow up in one year for a Physical. Please call the office before your Physical to see if you need blood work completed prior to your physical.     Please call me if any questions arise from now until your next visit. I will call you after I am done seeing patients. A Doctor is always available by phone when the office is closed. Please feel free to call for help with any problem that you feel shouldn't wait until the office re-opens.     Jeremías Encinas, DO

## 2024-04-11 NOTE — ASSESSMENT & PLAN NOTE
Continue with primary prevention.  Continue statin as well as blood pressure and diabetic management.  Needs better diabetic control

## 2024-04-12 DIAGNOSIS — N18.4 CKD (CHRONIC KIDNEY DISEASE) STAGE 4, GFR 15-29 ML/MIN (MULTI): Primary | ICD-10-CM

## 2024-04-12 LAB
CREAT UR-MCNC: 60.1 MG/DL (ref 20–370)
MICROALBUMIN UR-MCNC: 157.7 MG/L
MICROALBUMIN/CREAT UR: 262.4 UG/MG CREAT

## 2024-04-12 PROCEDURE — RXMED WILLOW AMBULATORY MEDICATION CHARGE

## 2024-04-12 RX ORDER — FLUTICASONE FUROATE, UMECLIDINIUM BROMIDE AND VILANTEROL TRIFENATATE 200; 62.5; 25 UG/1; UG/1; UG/1
1 POWDER RESPIRATORY (INHALATION)
Qty: 180 EACH | Refills: 1 | Status: SHIPPED | OUTPATIENT
Start: 2024-04-12

## 2024-04-15 ENCOUNTER — PHARMACY VISIT (OUTPATIENT)
Dept: PHARMACY | Facility: CLINIC | Age: 74
End: 2024-04-15
Payer: COMMERCIAL

## 2024-04-17 DIAGNOSIS — E11.9 TYPE 2 DIABETES MELLITUS WITHOUT COMPLICATION, WITHOUT LONG-TERM CURRENT USE OF INSULIN (MULTI): ICD-10-CM

## 2024-04-17 RX ORDER — EZETIMIBE 10 MG/1
10 TABLET ORAL DAILY
Qty: 100 TABLET | Refills: 0 | Status: SHIPPED | OUTPATIENT
Start: 2024-04-17

## 2024-04-19 DIAGNOSIS — I10 BENIGN ESSENTIAL HYPERTENSION: ICD-10-CM

## 2024-04-22 ENCOUNTER — CLINICAL SUPPORT (OUTPATIENT)
Dept: AUDIOLOGY | Facility: CLINIC | Age: 74
End: 2024-04-22
Payer: COMMERCIAL

## 2024-04-22 DIAGNOSIS — H90.3 SENSORINEURAL HEARING LOSS (SNHL) OF BOTH EARS: Primary | ICD-10-CM

## 2024-04-22 PROCEDURE — 92557 COMPREHENSIVE HEARING TEST: CPT | Performed by: AUDIOLOGIST

## 2024-04-22 PROCEDURE — 92550 TYMPANOMETRY & REFLEX THRESH: CPT | Performed by: AUDIOLOGIST

## 2024-04-22 RX ORDER — ATENOLOL 50 MG/1
50 TABLET ORAL DAILY
Qty: 100 TABLET | Refills: 0 | Status: SHIPPED | OUTPATIENT
Start: 2024-04-22

## 2024-04-22 ASSESSMENT — ENCOUNTER SYMPTOMS: OCCASIONAL FEELINGS OF UNSTEADINESS: 0

## 2024-04-22 ASSESSMENT — PAIN SCALES - GENERAL: PAINLEVEL_OUTOF10: 0 - NO PAIN

## 2024-04-22 ASSESSMENT — PAIN - FUNCTIONAL ASSESSMENT: PAIN_FUNCTIONAL_ASSESSMENT: 0-10

## 2024-04-22 NOTE — PROGRESS NOTES
AUDIOLOGY ADULT AUDIOMETRIC EVALUATION    Name:  Dererk Messina  :  1950  Age:  74 y.o.  Date of Evaluation:  2024    Reason for visit: Mr. Messina is seen in the clinic today for an audiologic evaluation.    HISTORY  Patient reports some difficulty hearing; however, he does well overall. He also reports periodic tinnitus and periodic aural pressure in both ears. Patient denies otalgia, otorrhea, dizziness/balance disturbance, a history of noise exposure, a history of otologic surgery, and a family history of hearing loss. Case history was obtained from the patient.    Prior audiogram dated 03/15/2023 revealed borderline normal hearing sensitivity/mild sensorineural hearing loss 250-2000 Hz sloping to mild to severe 9930-0399 Hz bilaterally. Word recognition ability was excellent bilaterally.     SCREENINGS  Steadi Fall Risk  One or more falls in the last year? No  Feels unsteady when walking? No  Worried about Falling? No    Domestic Violence Screening  Are you currently or have you recently been threatened or abused physically, emotionally, or sexually by anyone? No  Do you feel UNSAFE going back to the place you are living? No    Pain Assessment  Pain Assessment: 0-10  Pain Score: 0 - No pain    EVALUATION  See scanned audiogram: “Media” > “Audiology Report” > Document ID [id number].    RESULTS  Otoscopic Evaluation  Right Ear: clear ear canal with visualization of the tympanic membrane  Left Ear: clear ear canal with visualization of the tympanic membrane    Immittance Measures  Tympanometry:  Right Ear: Type A, normal tympanic membrane mobility with normal middle ear pressure  Left Ear: Type A, normal tympanic membrane mobility with normal middle ear pressure    Acoustic Reflexes:  Ipsilateral Right Ear: present and normal from 500 Hz to 4000 Hz  Ipsilateral Left Ear: present and normal from 500 Hz to 4000 Hz  Contralateral Right Ear: did not evaluate  Contralateral Left Ear: did not  evaluate    Distortion Product Otoacoustic Emissions (DPOAEs)  Right Ear: present from 1000 Hz to 4000 Hz, absent from 5000 Hz to 8000 Hz  Left Ear: present from 1000 Hz to 3000 Hz, absent from 4000 Hz to 8000 Hz    Audiometry  Test Technique and Reliability:   Standard audiometry via supra-aural headphones. Pulsed tone stimulus. Reliability is good.    Pure tone air and bone conduction audiometry:  Right Ear: normal hearing sensitivity through 2000 Hz with a mild to severe sensorineural hearing loss in the higher frequencies   Left Ear: normal hearing sensitivity through 2000 Hz with a mild to severe sensorineural hearing loss in the higher frequencies     Speech Audiometry:  Speech Reception Threshold (SRT) Right Ear: 20 dB HL  Speech Reception Threshold (SRT) Left Ear: 20 dB HL  Word Recognition Score (WRS) Right Ear: Excellent, 100% at 60 dB HL  Word Recognition Score (WRS) Left Ear: Excellent, 100% at 60 dB HL     IMPRESSIONS  Audiometric evaluation revealed high frequency sensorineural hearing loss bilaterally. Tympanometry indicates normal tympanic membrane mobility with normal middle ear pressure bilaterally. Results are essentially stable compared with 03/15/2023 evaluation. The presence of acoustic reflexes within normal intensity limits is consistent with normal middle ear and brainstem function, and suggests that auditory sensitivity is not significantly impaired. Present Distortion Product Otoacoustic Emissions (DPOAEs) suggest normal/near normal cochlear outer hair cell function and are consistent with no greater than a mild hearing loss at those frequencies. Absent DPOAEs are consistent with abnormal cochlear outer hair cell function and some degree of hearing loss at those frequencies.    RECOMMENDATIONS  - Annual audiologic evaluation, sooner if an acute change is noted.  - Consider hearing aids. Contact insurance to determine if there is an applicable benefit and where it can be used. Schedule a  hearing aid evaluation appointment should he wish to discuss. Patient uninterested today.  - Follow-up with medical care team as planned.    PATIENT EDUCATION  Discussed results, impressions and recommendations with the patient. Questions were addressed and the patient was encouraged to contact our office should concerns arise.    Time for this encounter: [id number]    Kory Oswald, CCC-A  Licensed Audiologist

## 2024-04-30 ENCOUNTER — TELEMEDICINE (OUTPATIENT)
Dept: PHARMACY | Facility: HOSPITAL | Age: 74
End: 2024-04-30
Payer: COMMERCIAL

## 2024-04-30 DIAGNOSIS — E11.9 TYPE 2 DIABETES MELLITUS WITHOUT COMPLICATION, WITHOUT LONG-TERM CURRENT USE OF INSULIN (MULTI): ICD-10-CM

## 2024-04-30 PROCEDURE — RXMED WILLOW AMBULATORY MEDICATION CHARGE

## 2024-04-30 RX ORDER — TIRZEPATIDE 10 MG/.5ML
10 INJECTION, SOLUTION SUBCUTANEOUS
Qty: 2 ML | Refills: 0 | Status: SHIPPED | OUTPATIENT
Start: 2024-04-30 | End: 2024-05-28 | Stop reason: DRUGHIGH

## 2024-04-30 NOTE — ASSESSMENT & PLAN NOTE
Patient's diabetes needs improvement with most recent A1c of 8% (Goal < 7%) on 1/8/24. 14-day average and GMI show blood sugars continue to be uncontrolled, although slightly improved since last visit. Patient reports weight of 233 lbs. No side effects noted with change from Trulicity to Mounjaro.  Increase: Mounjaro 7.5 mg to 10 mg weekly - first dose 5/13/24  Continue Jardiance 25 mg daily, metformin  mg twice daily, and glimepiride 2 mg twice daily.  Compliance at present is estimated to be fair.   Follow-up: 5/28/24 at 11 am  PCP Follow-Up: 7/11/24

## 2024-04-30 NOTE — PROGRESS NOTES
Subjective   Patient ID: Javier Messina is a 74 y.o. male who presents for Diabetes.    Referring Provider: Jeremías Encinas DO    Diabetes  He presents for his follow-up diabetic visit. He has type 2 diabetes mellitus. There are no hypoglycemic associated symptoms. Current diabetic treatment includes oral agent (triple therapy). He is compliant with treatment all of the time. He is following a generally healthy diet. He participates in exercise three times a week. An ACE inhibitor/angiotensin II receptor blocker is being taken. Eye exam is current.     Breakfast: Multigrain bread or multiwheat English muffin or brown rice a 2-3 times a week; oranges  Dinner: pasta, red meat occasionally, chicken, peas, carrots, celery  Drinks: Water, Diet Pepsi occasionally, 2% milk, black coffee    Physical activity includes yardwork. He has been on the treadmill 15-20 minutes at least 3 times a week.    Blood sugars:  Usually below 200 upon waking.   14-day Average: 225 mg/dL (4/9: 232 mg/dL)  GMI 8.4% (4/9: 8.6%)    Current diabetes medications:  Jardiance 25 mg daily  Mounjaro 7.5 mg weekly injection on Mondays  Metformin  mg 1 tablet twice daily  Glimepiride 2 mg twice daily    Objective     There were no vitals taken for this visit.     Labs  Lab Results   Component Value Date    BILITOT 0.3 02/25/2023    CALCIUM 10.1 04/11/2024    CO2 22 04/11/2024     04/11/2024    CREATININE 2.43 (H) 04/11/2024    GLUCOSE 177 (H) 04/11/2024    ALKPHOS 45 02/25/2023    K 5.7 (H) 04/11/2024    PROT 7.6 02/25/2023     04/11/2024    AST 20 02/25/2023    ALT 21 02/25/2023    BUN 44 (H) 04/11/2024    ANIONGAP 18 04/11/2024    PHOS 4.4 04/11/2024    ALBUMIN 4.2 04/11/2024    GFRMALE 31 (A) 08/22/2023     Lab Results   Component Value Date    TRIG 762 (H) 04/11/2024    CHOL 181 04/11/2024    LDLCALC  04/11/2024      Comment:      The calculation of LDL and VLDL are inaccurate when the Triglycerides are greater than 400 mg/dL or when  the patient is non-fasting. If LDL measurement is necessary contact the testing laboratory for an alternative LDL assay.                                  Near   Borderline      AGE      Desirable  Optimal    High     High     Very High     0-19 Y     0 - 109     ---    110-129   >/= 130     ----    20-24 Y     0 - 119     ---    120-159   >/= 160     ----      >24 Y     0 -  99   100-129  130-159   160-189     >/=190      HDL 34.2 04/11/2024     Lab Results   Component Value Date    HGBA1C 8.0 (H) 01/08/2024       Current Outpatient Medications on File Prior to Visit   Medication Sig Dispense Refill    atenolol (Tenormin) 50 mg tablet TAKE 1 TABLET BY MOUTH ONCE  DAILY 100 tablet 0    DULoxetine (Cymbalta) 60 mg DR capsule Take 1 capsule (60 mg) by mouth once daily. 100 capsule 1    empagliflozin (Jardiance) 25 mg Take 1 tablet (25 mg) by mouth once daily. 90 tablet 1    ezetimibe (Zetia) 10 mg tablet Take 1 tablet (10 mg) by mouth once daily. 100 tablet 0    fenofibrate (Triglide) 160 mg tablet Take 1 tablet (160 mg) by mouth once daily. 90 tablet 3    fluticasone-umeclidin-vilanter (Trelegy Ellipta) 200-62.5-25 mcg blister with device Inhale 1 puff once daily in the morning. Take before meals. 180 each 1    glimepiride (Amaryl) 2 mg tablet TAKE 1 TABLET BY MOUTH  TWICE DAILY 200 tablet 2    lisinopril 40 mg tablet Take 1 tablet (40 mg) by mouth once daily. 100 tablet 3    metFORMIN  mg 24 hr tablet Take 2 tablets (1,000 mg) by mouth 2 times a day. (Patient taking differently: Take 1 tablet (500 mg) by mouth 2 times a day.) 400 tablet 0    montelukast (Singulair) 10 mg tablet Take 1 tablet (10 mg) by mouth once daily at bedtime. 100 tablet 3    multivitamin-iron-folic acid (Centrum Complete)  mg-mcg tablet tablet Take 1 tablet by mouth once daily.      pravastatin (Pravachol) 40 mg tablet TAKE 1 TABLET BY MOUTH ONCE  DAILY 90 tablet 3    [DISCONTINUED] tirzepatide (Mounjaro) 7.5 mg/0.5 mL pen injector  Inject 7.5 mg under the skin every 7 days. 2 mL 0     No current facility-administered medications on file prior to visit.        Assessment/Plan   Problem List Items Addressed This Visit             ICD-10-CM    Diabetes mellitus (Multi) E11.9     Patient's diabetes needs improvement with most recent A1c of 8% (Goal < 7%) on 1/8/24. 14-day average and GMI show blood sugars continue to be uncontrolled, although slightly improved since last visit. Patient reports weight of 233 lbs. No side effects noted with change from Trulicity to Mounjaro.  Increase: Mounjaro 7.5 mg to 10 mg weekly - first dose 5/13/24  Continue Jardiance 25 mg daily, metformin  mg twice daily, and glimepiride 2 mg twice daily.  Compliance at present is estimated to be fair.   Follow-up: 5/28/24 at 11 am  PCP Follow-Up: 7/11/24         Relevant Medications    tirzepatide (Mounjaro) 10 mg/0.5 mL pen injector    Other Relevant Orders    Follow Up In [facility] - Pharmacy       Linh Carolina, PharmD    Continue all meds under the continuation of care with the referring provider and clinical pharmacy team.    Verbal consent to manage patient's drug therapy was obtained from the patient. They were informed they may decline to participate or withdraw from participation in pharmacy services at any time.

## 2024-05-06 ENCOUNTER — PHARMACY VISIT (OUTPATIENT)
Dept: PHARMACY | Facility: CLINIC | Age: 74
End: 2024-05-06
Payer: COMMERCIAL

## 2024-05-12 DIAGNOSIS — E11.9 TYPE 2 DIABETES MELLITUS WITHOUT COMPLICATION, WITHOUT LONG-TERM CURRENT USE OF INSULIN (MULTI): ICD-10-CM

## 2024-05-14 ENCOUNTER — APPOINTMENT (OUTPATIENT)
Dept: PHARMACY | Facility: HOSPITAL | Age: 74
End: 2024-05-14
Payer: COMMERCIAL

## 2024-05-14 RX ORDER — METFORMIN HYDROCHLORIDE 500 MG/1
1000 TABLET, EXTENDED RELEASE ORAL DAILY
Qty: 180 TABLET | Refills: 0 | Status: SHIPPED | OUTPATIENT
Start: 2024-05-14

## 2024-05-28 ENCOUNTER — TELEMEDICINE (OUTPATIENT)
Dept: PHARMACY | Facility: HOSPITAL | Age: 74
End: 2024-05-28
Payer: COMMERCIAL

## 2024-05-28 DIAGNOSIS — E11.9 TYPE 2 DIABETES MELLITUS WITHOUT COMPLICATION, WITHOUT LONG-TERM CURRENT USE OF INSULIN (MULTI): ICD-10-CM

## 2024-05-28 PROCEDURE — RXMED WILLOW AMBULATORY MEDICATION CHARGE

## 2024-05-28 RX ORDER — TIRZEPATIDE 10 MG/.5ML
10 INJECTION, SOLUTION SUBCUTANEOUS
Qty: 2 ML | Refills: 0 | Status: CANCELLED | OUTPATIENT
Start: 2024-05-28

## 2024-05-28 RX ORDER — TIRZEPATIDE 12.5 MG/.5ML
12.5 INJECTION, SOLUTION SUBCUTANEOUS
Qty: 2 ML | Refills: 0 | Status: SHIPPED | OUTPATIENT
Start: 2024-05-28

## 2024-05-28 NOTE — ASSESSMENT & PLAN NOTE
Patient's diabetes needs improvement with most recent A1c of 8% (Goal < 7%) on 1/8/24. 14-day average shows blood sugars continue to be uncontrolled, although decently improved since last visit. Still no side effects with Mounjaro. Last RFP in April showed eGFR of 27. Metformin is contraindicated when eGFR <30.  Increase: Mounjaro 10 mg to 12.5 mg weekly   Continue Jardiance 25 mg daily and glimepiride 2 mg twice daily.  Discontinue: metformin  mg 2 tablets daily.  Compliance at present is estimated to be good.   A1C, RFP due at next PCP visit.  Follow-up: 6/25/24 at 11 am  PCP Follow-Up: 7/11/24

## 2024-05-29 ENCOUNTER — PHARMACY VISIT (OUTPATIENT)
Dept: PHARMACY | Facility: CLINIC | Age: 74
End: 2024-05-29
Payer: COMMERCIAL

## 2024-06-19 DIAGNOSIS — E11.9 TYPE 2 DIABETES MELLITUS WITHOUT COMPLICATION, WITHOUT LONG-TERM CURRENT USE OF INSULIN (MULTI): ICD-10-CM

## 2024-06-19 RX ORDER — TIRZEPATIDE 12.5 MG/.5ML
12.5 INJECTION, SOLUTION SUBCUTANEOUS
Qty: 2 ML | Refills: 0 | OUTPATIENT
Start: 2024-06-19

## 2024-06-25 ENCOUNTER — APPOINTMENT (OUTPATIENT)
Dept: PHARMACY | Facility: HOSPITAL | Age: 74
End: 2024-06-25
Payer: COMMERCIAL

## 2024-06-25 DIAGNOSIS — E11.9 TYPE 2 DIABETES MELLITUS WITHOUT COMPLICATION, WITHOUT LONG-TERM CURRENT USE OF INSULIN (MULTI): ICD-10-CM

## 2024-06-25 PROCEDURE — RXMED WILLOW AMBULATORY MEDICATION CHARGE

## 2024-06-25 RX ORDER — TIRZEPATIDE 12.5 MG/.5ML
12.5 INJECTION, SOLUTION SUBCUTANEOUS
Qty: 2 ML | Refills: 1 | Status: SHIPPED | OUTPATIENT
Start: 2024-06-25

## 2024-06-25 NOTE — ASSESSMENT & PLAN NOTE
Patient's diabetes needs improvement with most recent A1c of 8% (Goal < 7%) on 1/8/24. 14-day average shows blood sugars have improved quite decently. Still no side effects with Mounjaro. Last RFP in April showed eGFR of 27. Metformin is contraindicated when eGFR <30, will continue to hold until/unless renal function improves.  Continue: Mounjaro .12.5 mg weekly   Continue Jardiance 25 mg daily and glimepiride 2 mg twice daily.  Discontinue/hold: metformin  mg 2 tablets daily.  A1C, RFP due at next PCP visit.

## 2024-06-25 NOTE — PROGRESS NOTES
Subjective   Patient ID: Javier Messina is a 74 y.o. male who presents for Diabetes.    Referring Provider: Jeremías Encinas DO  Follow-up visit: 7/11/24    Diabetes  He presents for his follow-up diabetic visit. He has type 2 diabetes mellitus. There are no hypoglycemic associated symptoms. Current diabetic treatment includes oral agent (triple therapy). He is compliant with treatment all of the time. He is following a generally healthy diet. He participates in exercise three times a week. An ACE inhibitor/angiotensin II receptor blocker is being taken. Eye exam is current.     Breakfast: Multigrain bread or multiwheat English muffin or brown rice a 2-3 times a week; oranges  Dinner: pasta, red meat occasionally, chicken, peas, carrots, celery  Drinks: Water, Diet Pepsi occasionally, 2% milk, black coffee    Physical activity includes yardwork. He has been on the treadmill 15-20 minutes at least 3 times a week.    Blood sugars:  Last 14-day average report: 189   Has not had Dexcom for a couple weeks due to lapse in refills.    Last weight: 233.4 lbs    Current diabetes medications:  Jardiance 25 mg daily  Mounjaro 12.5 mg weekly injection on Mondays  Glimepiride 2 mg twice daily    Objective     There were no vitals taken for this visit.     Labs  Lab Results   Component Value Date    BILITOT 0.3 02/25/2023    CALCIUM 10.1 04/11/2024    CO2 22 04/11/2024     04/11/2024    CREATININE 2.43 (H) 04/11/2024    GLUCOSE 177 (H) 04/11/2024    ALKPHOS 45 02/25/2023    K 5.7 (H) 04/11/2024    PROT 7.6 02/25/2023     04/11/2024    AST 20 02/25/2023    ALT 21 02/25/2023    BUN 44 (H) 04/11/2024    ANIONGAP 18 04/11/2024    PHOS 4.4 04/11/2024    ALBUMIN 4.2 04/11/2024    GFRMALE 31 (A) 08/22/2023     Lab Results   Component Value Date    TRIG 762 (H) 04/11/2024    CHOL 181 04/11/2024    LDLCALC  04/11/2024      Comment:      The calculation of LDL and VLDL are inaccurate when the Triglycerides are greater than 400  mg/dL or when the patient is non-fasting. If LDL measurement is necessary contact the testing laboratory for an alternative LDL assay.                                  Near   Borderline      AGE      Desirable  Optimal    High     High     Very High     0-19 Y     0 - 109     ---    110-129   >/= 130     ----    20-24 Y     0 - 119     ---    120-159   >/= 160     ----      >24 Y     0 -  99   100-129  130-159   160-189     >/=190      HDL 34.2 04/11/2024     Lab Results   Component Value Date    HGBA1C 8.0 (H) 01/08/2024       Current Outpatient Medications on File Prior to Visit   Medication Sig Dispense Refill    atenolol (Tenormin) 50 mg tablet TAKE 1 TABLET BY MOUTH ONCE  DAILY 100 tablet 0    DULoxetine (Cymbalta) 60 mg DR capsule Take 1 capsule (60 mg) by mouth once daily. 100 capsule 1    empagliflozin (Jardiance) 25 mg Take 1 tablet (25 mg) by mouth once daily. 90 tablet 1    ezetimibe (Zetia) 10 mg tablet Take 1 tablet (10 mg) by mouth once daily. 100 tablet 0    fenofibrate (Triglide) 160 mg tablet Take 1 tablet (160 mg) by mouth once daily. 90 tablet 3    fluticasone-umeclidin-vilanter (Trelegy Ellipta) 200-62.5-25 mcg blister with device Inhale 1 puff once daily in the morning. Take before meals. 180 each 1    glimepiride (Amaryl) 2 mg tablet TAKE 1 TABLET BY MOUTH  TWICE DAILY 200 tablet 2    lisinopril 40 mg tablet Take 1 tablet (40 mg) by mouth once daily. 100 tablet 3    metFORMIN  mg 24 hr tablet Take 2 tablets (1,000 mg) by mouth once daily. (Patient not taking: Reported on 6/25/2024) 180 tablet 0    montelukast (Singulair) 10 mg tablet Take 1 tablet (10 mg) by mouth once daily at bedtime. 100 tablet 3    multivitamin-iron-folic acid (Centrum Complete)  mg-mcg tablet tablet Take 1 tablet by mouth once daily.      pravastatin (Pravachol) 40 mg tablet TAKE 1 TABLET BY MOUTH ONCE  DAILY 90 tablet 3    [DISCONTINUED] tirzepatide (Mounjaro) 12.5 mg/0.5 mL pen injector Inject 12.5 mg under  the skin every 7 days. 2 mL 0     No current facility-administered medications on file prior to visit.        Assessment/Plan   Problem List Items Addressed This Visit             ICD-10-CM    Diabetes mellitus (Multi) E11.9     Patient's diabetes needs improvement with most recent A1c of 8% (Goal < 7%) on 1/8/24. 14-day average shows blood sugars have improved quite decently. Still no side effects with Mounjaro. Last RFP in April showed eGFR of 27. Metformin is contraindicated when eGFR <30, will continue to hold until/unless renal function improves.  Continue: Mounjaro .12.5 mg weekly   Continue Jardiance 25 mg daily and glimepiride 2 mg twice daily.  Discontinue/hold: metformin  mg 2 tablets daily.  A1C, RFP due at next PCP visit.         Relevant Medications    tirzepatide (Mounjaro) 12.5 mg/0.5 mL pen injector    Other Relevant Orders    Follow Up In [facility] - Pharmacy     Follow-up: 7/23/24 at 10:30 am    Linh Carolina PharmD    Continue all meds under the continuation of care with the referring provider and clinical pharmacy team.    Verbal consent to manage patient's drug therapy was obtained from the patient. They were informed they may decline to participate or withdraw from participation in pharmacy services at any time.

## 2024-06-27 ENCOUNTER — PHARMACY VISIT (OUTPATIENT)
Dept: PHARMACY | Facility: CLINIC | Age: 74
End: 2024-06-27
Payer: COMMERCIAL

## 2024-06-28 DIAGNOSIS — I10 BENIGN ESSENTIAL HYPERTENSION: ICD-10-CM

## 2024-07-01 RX ORDER — ATENOLOL 50 MG/1
50 TABLET ORAL DAILY
Qty: 100 TABLET | Refills: 2 | Status: SHIPPED | OUTPATIENT
Start: 2024-07-01

## 2024-07-03 DIAGNOSIS — E11.9 TYPE 2 DIABETES MELLITUS WITHOUT COMPLICATION, WITHOUT LONG-TERM CURRENT USE OF INSULIN (MULTI): ICD-10-CM

## 2024-07-03 RX ORDER — EZETIMIBE 10 MG/1
10 TABLET ORAL DAILY
Qty: 100 TABLET | Refills: 2 | Status: SHIPPED | OUTPATIENT
Start: 2024-07-03

## 2024-07-08 PROCEDURE — RXMED WILLOW AMBULATORY MEDICATION CHARGE

## 2024-07-11 ENCOUNTER — APPOINTMENT (OUTPATIENT)
Dept: PRIMARY CARE | Facility: CLINIC | Age: 74
End: 2024-07-11
Payer: COMMERCIAL

## 2024-07-12 ENCOUNTER — PHARMACY VISIT (OUTPATIENT)
Dept: PHARMACY | Facility: CLINIC | Age: 74
End: 2024-07-12
Payer: COMMERCIAL

## 2024-07-23 ENCOUNTER — APPOINTMENT (OUTPATIENT)
Dept: PHARMACY | Facility: HOSPITAL | Age: 74
End: 2024-07-23
Payer: COMMERCIAL

## 2024-08-03 DIAGNOSIS — E78.49 FAMILIAL COMBINED HYPERLIPIDEMIA: ICD-10-CM

## 2024-08-03 DIAGNOSIS — G62.89 OTHER POLYNEUROPATHY: ICD-10-CM

## 2024-08-04 RX ORDER — DULOXETIN HYDROCHLORIDE 60 MG/1
60 CAPSULE, DELAYED RELEASE ORAL DAILY
Qty: 100 CAPSULE | Refills: 1 | Status: SHIPPED | OUTPATIENT
Start: 2024-08-04

## 2024-08-05 RX ORDER — FENOFIBRATE 160 MG/1
160 TABLET ORAL DAILY
Qty: 100 TABLET | Refills: 2 | Status: SHIPPED | OUTPATIENT
Start: 2024-08-05

## 2024-08-05 NOTE — TELEPHONE ENCOUNTER
LOV note reviewed. Patient with recent prolonged hospital stay. Recent GFR of 50. Script cued and forwarded for signature

## 2024-08-16 ENCOUNTER — PATIENT OUTREACH (OUTPATIENT)
Dept: PRIMARY CARE | Facility: CLINIC | Age: 74
End: 2024-08-16
Payer: COMMERCIAL

## 2024-08-16 NOTE — PROGRESS NOTES
Discharge Facility:[facility]  Discharge Diagnosis:Necrotizing Fascitis   Admission Date:7/9/24  Discharge Date: 7/23/24-SNF    PCP Appointment Date:9/3/24  Specialist Appointment Date: N/A  Hospital Encounter and Summary Linked: Yes  See discharge assessment below for further details  Engagement  Call Start Time: 1008 (8/16/2024 10:08 AM)    Medications  Medications reviewed with patient/caregiver?: Yes (8/16/2024 10:08 AM)  Is the patient having any side effects they believe may be caused by any medication additions or changes?: No (8/16/2024 10:08 AM)  Does the patient have all medications ordered at discharge?: Yes (8/16/2024 10:08 AM)  Prescription Comments: START taking these medications  amoxicillin-clavulanate potassium 875-125 mg per tablet Commonly known as: AUGMENTIN Take 1 tablet by mouth two times a day for 11 days (8/16/2024 10:08 AM)  Is the patient taking all medications as directed (includes completed medication regime)?: Yes (8/16/2024 10:08 AM)  Care Management Interventions: Advised patient to call provider (8/16/2024 10:08 AM)  Medication Comments: SEE MED LIST (8/16/2024 10:08 AM)    Appointments  Does the patient have a primary care provider?: Yes (8/16/2024 10:08 AM)  Care Management Interventions: Verified appointment date/time/provider (9/3/24) (8/16/2024 10:08 AM)  Has the patient kept scheduled appointments due by today?: Yes (8/16/2024 10:08 AM)  Care Management Interventions: Advised patient to keep appointment; Educated on importance of keeping appointment (8/16/2024 10:08 AM)    Patient Teaching  Does the patient have access to their discharge instructions?: Yes (8/16/2024 10:08 AM)  Care Management Interventions: Reviewed instructions with patient (8/16/2024 10:08 AM)  What is the patient's perception of their health status since discharge?: Improving (8/16/2024 10:08 AM)  Is the patient/caregiver able to teach back the hierarchy of who to call/visit for symptoms/problems?  PCP, Specialist, Home Health nurse, Urgent Care, ED, 911: Yes (8/16/2024 10:08 AM)    Wrap Up  Wrap Up Additional Comments: This CM spoke with pt via phone. Pt reports doing well at home since discharge. New meds reviewed. Pt denies CP and SOB. Patient denies any further discharge questions/needs at this time. Emphasized that Follow up is needed after discharge to review the hospital recommendations, assess your response to your treatment.  Pt aware of my availability for non-emergent concerns. Contact info provided to patient (8/16/2024 10:08 AM)      Phoebe Mora LPN

## 2024-08-22 ENCOUNTER — OFFICE VISIT (OUTPATIENT)
Dept: PRIMARY CARE | Facility: CLINIC | Age: 74
End: 2024-08-22
Payer: COMMERCIAL

## 2024-08-22 ENCOUNTER — LAB (OUTPATIENT)
Dept: LAB | Facility: LAB | Age: 74
End: 2024-08-22
Payer: COMMERCIAL

## 2024-08-22 VITALS
BODY MASS INDEX: 29.99 KG/M2 | WEIGHT: 209 LBS | HEART RATE: 87 BPM | DIASTOLIC BLOOD PRESSURE: 51 MMHG | SYSTOLIC BLOOD PRESSURE: 145 MMHG | OXYGEN SATURATION: 98 %

## 2024-08-22 DIAGNOSIS — N18.30 CKD STAGE 3 SECONDARY TO DIABETES (MULTI): ICD-10-CM

## 2024-08-22 DIAGNOSIS — E11.22 CKD STAGE 3 SECONDARY TO DIABETES (MULTI): ICD-10-CM

## 2024-08-22 DIAGNOSIS — M72.6 NECROTIZING FASCIITIS (MULTI): ICD-10-CM

## 2024-08-22 DIAGNOSIS — B35.6 TINEA CRURIS: ICD-10-CM

## 2024-08-22 DIAGNOSIS — J44.9 COPD, MILD (MULTI): ICD-10-CM

## 2024-08-22 DIAGNOSIS — N18.30 CKD STAGE 3 SECONDARY TO DIABETES (MULTI): Primary | ICD-10-CM

## 2024-08-22 DIAGNOSIS — I10 BENIGN ESSENTIAL HYPERTENSION: ICD-10-CM

## 2024-08-22 DIAGNOSIS — E11.9 TYPE 2 DIABETES MELLITUS WITHOUT COMPLICATION, WITHOUT LONG-TERM CURRENT USE OF INSULIN (MULTI): ICD-10-CM

## 2024-08-22 DIAGNOSIS — E11.22 CKD STAGE 3 SECONDARY TO DIABETES (MULTI): Primary | ICD-10-CM

## 2024-08-22 LAB
EST. AVERAGE GLUCOSE BLD GHB EST-MCNC: 177 MG/DL
HBA1C MFR BLD: 7.8 %

## 2024-08-22 PROCEDURE — 3060F POS MICROALBUMINURIA REV: CPT | Performed by: INTERNAL MEDICINE

## 2024-08-22 PROCEDURE — 1123F ACP DISCUSS/DSCN MKR DOCD: CPT | Performed by: INTERNAL MEDICINE

## 2024-08-22 PROCEDURE — G2211 COMPLEX E/M VISIT ADD ON: HCPCS | Performed by: INTERNAL MEDICINE

## 2024-08-22 PROCEDURE — 3077F SYST BP >= 140 MM HG: CPT | Performed by: INTERNAL MEDICINE

## 2024-08-22 PROCEDURE — 80069 RENAL FUNCTION PANEL: CPT

## 2024-08-22 PROCEDURE — 83036 HEMOGLOBIN GLYCOSYLATED A1C: CPT

## 2024-08-22 PROCEDURE — 36415 COLL VENOUS BLD VENIPUNCTURE: CPT

## 2024-08-22 PROCEDURE — 1036F TOBACCO NON-USER: CPT | Performed by: INTERNAL MEDICINE

## 2024-08-22 PROCEDURE — 99214 OFFICE O/P EST MOD 30 MIN: CPT | Performed by: INTERNAL MEDICINE

## 2024-08-22 PROCEDURE — 1159F MED LIST DOCD IN RCRD: CPT | Performed by: INTERNAL MEDICINE

## 2024-08-22 PROCEDURE — 1160F RVW MEDS BY RX/DR IN RCRD: CPT | Performed by: INTERNAL MEDICINE

## 2024-08-22 PROCEDURE — 3052F HG A1C>EQUAL 8.0%<EQUAL 9.0%: CPT | Performed by: INTERNAL MEDICINE

## 2024-08-22 PROCEDURE — 3078F DIAST BP <80 MM HG: CPT | Performed by: INTERNAL MEDICINE

## 2024-08-22 RX ORDER — LOSARTAN POTASSIUM AND HYDROCHLOROTHIAZIDE 25; 100 MG/1; MG/1
1 TABLET ORAL DAILY
COMMUNITY
Start: 2024-08-16

## 2024-08-22 RX ORDER — KETOCONAZOLE 20 MG/G
CREAM TOPICAL 2 TIMES DAILY
Qty: 60 G | Refills: 2 | Status: SHIPPED | OUTPATIENT
Start: 2024-08-22

## 2024-08-22 NOTE — ASSESSMENT & PLAN NOTE
Uncontrolled at this time but did not take his medications.  Please take your medications as prescribed and return to clinic in 3 months for reevaluation

## 2024-08-22 NOTE — ASSESSMENT & PLAN NOTE
Agree with stopping the metformin.  Check renal function.  Mounjaro increased to 12.5.  May need further adjustments to medications.  He is on an SGLT2 but less likely to be of benefit due to CKD 3 may need additional diabetic medications.  Encouraged low carbohydrate diet.

## 2024-08-22 NOTE — PROGRESS NOTES
"Subjective   Patient ID: Derrek Messina \"Anabelle" is a 74 y.o. male who presents for Hospital Follow-up.          HPI     Patient is a 74-year-old male with past medical history of obstructive sleep apnea diabetes mellitus type 2 hypertension dyslipidemia and asymptomatic coronary artery disease chronic kidney disease stage III who presents for follow-up from recent hospitalization.    In July 2024 patient had a tooth removed and subsequently developed necrotizing fasciitis of the neck that required exploratory surgery of the neck.  Patient completed course of vancomycin and underwent extensive surgery of the neck.  No malignant cells.  He states he has an order for a modified barium swallow and speech evaluation.  The incision is healing nicely.  His recovery was complicated by his COPD which made it difficult to extubate.  He is following with ear nose and throat at [facility].    With regards to his diabetes his metformin was stopped and Mounjaro was increased to 12.5.  He is due for an A1c check.  Metformin was stopped due to his chronic kidney disease stage III he is due for renal function today.  No swelling of the legs..     He does complain of a rash in the rectal area.  It has been present for 2 weeks.  He has been trying to protect it from fecal matter but finds it difficult.    Hypertension currently uncontrolled but he did not take his medications this morning.  There were several adjustments to his medications made including stopping lisinopril and starting losartan HCTZ.  He is supposed to be taking atenolol 50 but he has a current dosing for 25 mg    Patient is severely deconditioned since the operation and now walking with a cane.  He is getting physical therapy at home          COPD. Not smoking. Occasional SOB on exertion but overall stable. No recent exacerbation He denies any wheezing. Most recent PFTs showing mild to moderate obstruction patient is currently taking Breo and albuterol.    "        B/L renal cysts with hx of nephrolithiasis. Following with nephro.  Recent ultrasound of the kidneys shows stability of the cysts        Review of Systems  Constitutional: No fever or chills, No Night Sweats  Eyes: No Blurry Vision or Eye sight problems  ENT: Healing incision right anterior neck  Cardiovascular: no chest pain, no palpitations and no syncope.   Respiratory: no cough, no shortness of breath during exertion and no shortness of breath at rest.   Gastrointestinal: no abdominal pain, no nausea and no vomiting.   : No issues with urinary stream, burning with urination, no blood in urine or stools  Skin: Erythema affecting the buttocks region and inguinal folds  Neuro: No Headache, no dizziness or Numbness or tingling  Psych: No Anxiety, depression or sleeping problems  Heme: No Easy bleeding or brusing.     Objective   /51   Pulse 87   Wt 94.8 kg (209 lb)   SpO2 98%   BMI 29.99 kg/m²     Physical Exam  Constitutional: Alert and in no acute distress. Well developed, well nourished.   Head and Face: Head and face: Normal.    Eyes: Normal external exam. Pupils were equal in size, round, reactive to light (PERRL) with normal accommodation and extraocular movements intact (EOMI).   Ears, Nose, Mouth, and Throat: External inspection of ears and nose: Normal.  Hearing: Normal.  Nasal mucosa, septum, and turbinates: Normal.  Lips, teeth, and gums: Normal.  Oropharynx: Normal.   Cardiovascular: Heart rate and rhythm were normal, normal S1 and S2. Pedal pulses: Normal. No peripheral edema.   Pulmonary: No respiratory distress. Clear bilateral breath sounds.   Abdomen: Soft nontender; no abdominal mass palpated. Normal bowel sounds. No organomegaly.   ENT healing incision of the neck  MSK diffuse muscle weakness    Lab Results   Component Value Date    WBC 7.6 06/20/2023    HGB 15.2 06/20/2023    HCT 48.2 06/20/2023     06/20/2023    CHOL 181 04/11/2024    TRIG 762 (H) 04/11/2024    HDL 34.2  04/11/2024    LDLDIRECT 47 (L) 02/25/2023    ALT 21 02/25/2023    AST 20 02/25/2023     04/11/2024    K 5.7 (H) 04/11/2024     04/11/2024    CREATININE 2.43 (H) 04/11/2024    BUN 44 (H) 04/11/2024    CO2 22 04/11/2024    TSH 1.72 06/10/2021    PSA 0.7 02/25/2023    HGBA1C 8.0 (H) 01/08/2024    ALBUR 259 (H) 02/25/2023       Complete Pulmonary Function Test Pre/Post Bronchodialator (Spirometry Pre/Post/DLCO/Lung Volumes)  Pulmonary function test    Findings:    Reduced FEV1 to FVC ratio noted.  FEV1 is 2.25 L which is 73% of predicted   normal.    The shape of the flow-volume loop suggest obstruction.    Total lung capacity is 5.92 L which is 83% of predicted normal.  RV/TLC   ratio is elevated at 44%.    Normal DLCO noted.  DLCO is 23.38 ml/min/mmHg which is 92% of predicted    Impression    Moderate obstructive ventilatory defect with preserved diffusion capacity.    James Tipton MD  Pulmonary/ Medicine  [facility]      Assessment/Plan   Problem List Items Addressed This Visit             ICD-10-CM    CKD stage 3 secondary to diabetes (Multi) - Primary E11.22, N18.30    Relevant Orders    Hemoglobin A1C    Renal function panel    Follow Up In [facility] - PCP - Established    Benign essential hypertension I10     Uncontrolled at this time but did not take his medications.  Please take your medications as prescribed and return to clinic in 3 months for reevaluation         COPD, mild (Multi) J44.9     Improved symptoms since starting the Trelegy.  Continue the Trelegy and follow-up 3 months.           Diabetes mellitus (Multi) E11.9     Agree with stopping the metformin.  Check renal function.  Mounjaro increased to 12.5.  May need further adjustments to medications.  He is on an SGLT2 but less likely to be of benefit due to CKD 3 may need additional diabetic medications.  Encouraged low carbohydrate diet.           Tinea cruris B35.6    Relevant Medications    ketoconazole  (NIZOral) 2 % cream    Other Relevant Orders    Follow Up In [facility] - PCP - Established    Necrotizing fasciitis (Multi) M72.6     Necrotizing fasciitis of the neck status post incision and cleanout.  Complete course of antibiotics.  Healing well.  Continue following with speech.

## 2024-08-22 NOTE — ASSESSMENT & PLAN NOTE
Necrotizing fasciitis of the neck status post incision and cleanout.  Complete course of antibiotics.  Healing well.  Continue following with speech.

## 2024-08-23 LAB
ALBUMIN SERPL BCP-MCNC: 4.3 G/DL (ref 3.4–5)
ANION GAP SERPL CALC-SCNC: 21 MMOL/L (ref 10–20)
BUN SERPL-MCNC: 32 MG/DL (ref 6–23)
CALCIUM SERPL-MCNC: 9.9 MG/DL (ref 8.6–10.6)
CHLORIDE SERPL-SCNC: 105 MMOL/L (ref 98–107)
CO2 SERPL-SCNC: 17 MMOL/L (ref 21–32)
CREAT SERPL-MCNC: 1.85 MG/DL (ref 0.5–1.3)
EGFRCR SERPLBLD CKD-EPI 2021: 38 ML/MIN/1.73M*2
GLUCOSE SERPL-MCNC: 150 MG/DL (ref 74–99)
PHOSPHATE SERPL-MCNC: 3.6 MG/DL (ref 2.5–4.9)
POTASSIUM SERPL-SCNC: 5.1 MMOL/L (ref 3.5–5.3)
SODIUM SERPL-SCNC: 138 MMOL/L (ref 136–145)

## 2024-08-28 ENCOUNTER — PATIENT OUTREACH (OUTPATIENT)
Dept: PRIMARY CARE | Facility: CLINIC | Age: 74
End: 2024-08-28
Payer: COMMERCIAL

## 2024-08-29 ENCOUNTER — OFFICE VISIT (OUTPATIENT)
Dept: PRIMARY CARE | Facility: CLINIC | Age: 74
End: 2024-08-29
Payer: COMMERCIAL

## 2024-08-29 VITALS — DIASTOLIC BLOOD PRESSURE: 63 MMHG | HEART RATE: 76 BPM | OXYGEN SATURATION: 98 % | SYSTOLIC BLOOD PRESSURE: 133 MMHG

## 2024-08-29 DIAGNOSIS — R45.82 WORRIES: Primary | ICD-10-CM

## 2024-08-29 PROCEDURE — 1159F MED LIST DOCD IN RCRD: CPT | Performed by: INTERNAL MEDICINE

## 2024-08-29 PROCEDURE — 3078F DIAST BP <80 MM HG: CPT | Performed by: INTERNAL MEDICINE

## 2024-08-29 PROCEDURE — 1160F RVW MEDS BY RX/DR IN RCRD: CPT | Performed by: INTERNAL MEDICINE

## 2024-08-29 PROCEDURE — 1123F ACP DISCUSS/DSCN MKR DOCD: CPT | Performed by: INTERNAL MEDICINE

## 2024-08-29 PROCEDURE — 99213 OFFICE O/P EST LOW 20 MIN: CPT | Performed by: INTERNAL MEDICINE

## 2024-08-29 PROCEDURE — 3075F SYST BP GE 130 - 139MM HG: CPT | Performed by: INTERNAL MEDICINE

## 2024-08-29 PROCEDURE — 3051F HG A1C>EQUAL 7.0%<8.0%: CPT | Performed by: INTERNAL MEDICINE

## 2024-08-29 PROCEDURE — 1036F TOBACCO NON-USER: CPT | Performed by: INTERNAL MEDICINE

## 2024-08-29 PROCEDURE — 3060F POS MICROALBUMINURIA REV: CPT | Performed by: INTERNAL MEDICINE

## 2024-08-29 ASSESSMENT — ENCOUNTER SYMPTOMS: NECK PAIN: 1

## 2024-08-29 NOTE — PROGRESS NOTES
"Subjective   Patient ID: Derrek Messina \"Anabelle" is a 74 y.o. male who presents for Neck Pain and Drainage from Incision.          Neck Pain          Patient is a 74-year-old male with past medical history of obstructive sleep apnea diabetes mellitus type 2 hypertension dyslipidemia and asymptomatic coronary artery disease chronic kidney disease stage III who presents for follow-up     In July 2024 patient had a tooth removed and subsequently developed necrotizing fasciitis of the neck that required exploratory surgery of the neck.  Patient completed course of vancomycin and underwent extensive surgery of the neck.  No malignant cells.  He states he has an order for a modified barium swallow and speech evaluation.  The incision is healing nicely.  His recovery was complicated by his COPD which made it difficult to extubate.  He is following with [facility] nose [facility] throat at [facility].  He is concerned about the development of an infection.  There is no pain no fevers no redness along the incision site.  No bulging and no mass.    Review of Systems  Constitutional: No fever or chills, No Night Sweats  Eyes: No Blurry Vision or Eye sight problems  ENT: Healing incision right anterior neck  Cardiovascular: no chest pain, no palpitations and no syncope.   Respiratory: no cough, no shortness of breath during exertion and no shortness of breath at rest.   Gastrointestinal: no abdominal pain, no nausea and no vomiting.   : No issues with urinary stream, burning with urination, no blood in urine or stools  Skin: Erythema affecting the buttocks region and inguinal folds  Neuro: No Headache, no dizziness or Numbness or tingling  Psych: No Anxiety, depression or sleeping problems  Heme: No Easy bleeding or brusing.     Objective   /63   Pulse 76   SpO2 98%     Physical Exam  Constitutional: Alert and in no acute distress. Well developed, well nourished.   Head and Face: Head and face: Normal.    Eyes: Normal " external exam. Pupils were equal in size, round, reactive to light (PERRL) with normal accommodation and extraocular movements intact (EOMI).   Ears, Nose, Mouth, and Throat: External inspection of ears and nose: Normal.  Hearing: Normal.  Nasal mucosa, septum, and turbinates: Normal.  Lips, teeth, and gums: Normal.  Oropharynx: Normal.   Cardiovascular: Heart rate and rhythm were normal, normal S1 and S2. Pedal pulses: Normal. No peripheral edema.   Pulmonary: No respiratory distress. Clear bilateral breath sounds.   Abdomen: Soft nontender; no abdominal mass palpated. Normal bowel sounds. No organomegaly.   ENT healing incision of the neck  MSK diffuse muscle weakness    Lab Results   Component Value Date    WBC 7.6 06/20/2023    HGB 15.2 06/20/2023    HCT 48.2 06/20/2023     06/20/2023    CHOL 181 04/11/2024    TRIG 762 (H) 04/11/2024    HDL 34.2 04/11/2024    LDLDIRECT 47 (L) 02/25/2023    ALT 21 02/25/2023    AST 20 02/25/2023     08/22/2024    K 5.1 08/22/2024     08/22/2024    CREATININE 1.85 (H) 08/22/2024    BUN 32 (H) 08/22/2024    CO2 17 (L) 08/22/2024    TSH 1.72 06/10/2021    PSA 0.7 02/25/2023    HGBA1C 7.8 (H) 08/22/2024    ALBUR 259 (H) 02/25/2023       Complete Pulmonary Function Test Pre/Post Bronchodialator (Spirometry Pre/Post/DLCO/Lung Volumes)  Pulmonary function test    Findings:    Reduced FEV1 to FVC ratio noted.  FEV1 is 2.25 L which is 73% of predicted   normal.    The shape of the flow-volume loop suggest obstruction.    Total lung capacity is 5.92 L which is 83% of predicted normal.  RV/TLC   ratio is elevated at 44%.    Normal DLCO noted.  DLCO is 23.38 ml/min/mmHg which is 92% of predicted    Impression    Moderate obstructive ventilatory defect with preserved diffusion capacity.    James Tipton MD  Pulmonary/[facility] pulmonary [facility]      Assessment/Plan   Problem List Items Addressed This Visit             ICD-10-CM    Worries - Primary R45.82     No  evidence of infection in the area of the incision.  Will continue local wound care and follow-up with the ENT.  If you develop a fever or purulent drainage please go to the emergency room

## 2024-08-29 NOTE — ASSESSMENT & PLAN NOTE
No evidence of infection in the area of the incision.  Will continue local wound care and follow-up with the ENT.  If you develop a fever or purulent drainage please go to the emergency room

## 2024-08-30 NOTE — PROGRESS NOTES
Primary Care Physician: Jeremías Encinas DO  Date of Visit: 09/03/2024  1:40 PM EDT  Location of visit: [facility] [address]     Chief Complaint:   1 yr Follow up visit       HPI / Summary:   73 y/o with h/o CAD based on CAC score 2015 over 1000 and mild CAD seen on catheterization at that time, diabetes, hypertension , dyslipidemia, hypertriglyceridemia, obesity, COPD     Interval events:  [facility] hospitalization for neck necrotizing fasciitis s/p OR right neck surgery and washout    Denies any chest pain or tightness  Triglycerides remain elevated. Could not afford Vascepa. Still hasn't bought OTC Omega 3  No significant changes over the last 6 months.     PFTs 12/2021 - moderate obstruction with normal DLCO     ECHO 10/22: EF 60%, RVSP could not be obtained     exercise stress echo 3/2020:   1. No clear evidence of echocardiographic ischemia but imaging limited by off axis views.  2. Below average functional capacity at 6.6 METs.      Last Cardiology Tests:  ECG:      Echo: 7/22/24   Exam Indication: Hypoxia and Evaluation of cardiac function     _  Point-of-care Transthoracic Echocardiogram.   _  The left ventriclular size is normal. Left ventricular systolic function is      normal. No regional wall motion abnormalities.   _  The right ventricular size is normal. Right ventricular systolic function is      normal. Interventricular septal motion is normal.   _  There is no pericardial effusion. There is not evidence of cardiac tamponade.     10/2022:  1. Left ventricular systolic function is normal with a 60-65% estimated ejection fraction.   2. No valvular abnormalities.      Cath:  na    Stress Test:  na    Cardiac Imaging:    Past Medical History:  Past Medical History:   Diagnosis Date    Atherosclerotic heart disease of native coronary artery with unspecified angina pectoris (CMS-HCC)     Coronary artery disease involving native heart with angina pectoris, unspecified vessel or lesion type    Atherosclerotic heart  disease of native coronary artery with unspecified angina pectoris (CMS-HCC)     Coronary artery disease involving native heart with angina pectoris, unspecified vessel or lesion type    Other specified diabetes mellitus without complications (Multi)     Diabetes mellitus of other type without complication    Other specified diabetes mellitus without complications (Multi)     Diabetes mellitus of other type without complication    Personal history of diseases of the blood and blood-forming organs and certain disorders involving the immune mechanism     History of autoimmune disorder    Personal history of other diseases of the circulatory system     History of hypertension    Personal history of other diseases of the circulatory system     History of hypertension    Unspecified abnormal findings in urine 12/10/2014    Abnormal urine findings        Past Surgical History:  Past Surgical History:   Procedure Laterality Date    KIDNEY SURGERY  07/23/2015    Kidney Surgery    OTHER SURGICAL HISTORY  02/28/2022    Kidney surgery          Social History:  He reports that he has quit smoking. His smoking use included cigarettes. He has been exposed to tobacco smoke. He has never used smokeless tobacco. He reports current alcohol use. He reports that he does not use drugs.    Family History:  family history is not on file.      Allergies:  No Known Allergies    Outpatient Medications:  Current Outpatient Medications   Medication Instructions    atenolol (TENORMIN) 50 mg, oral, Daily    DULoxetine (CYMBALTA) 60 mg, oral, Daily    ezetimibe (ZETIA) 10 mg, oral, Daily    fenofibrate (TRIGLIDE) 160 mg, oral, Daily    fluticasone-umeclidin-vilanter (Trelegy Ellipta) 200-62.5-25 mcg blister with device 1 puff, inhalation, Daily before breakfast    glimepiride (AMARYL) 2 mg, oral, 2 times daily    Jardiance 25 mg, oral, Daily    ketoconazole (NIZOral) 2 % cream Topical, 2 times daily    losartan-hydrochlorothiazide (Hyzaar) 100-25  "mg tablet 1 tablet, oral, Daily    montelukast (SINGULAIR) 10 mg, oral, Nightly    Mounjaro 12.5 mg, subcutaneous, Every 7 days    pravastatin (PRAVACHOL) 40 mg, oral, Daily       Physical Exam:  ***  There were no vitals filed for this visit.  Wt Readings from Last 5 Encounters:   08/22/24 94.8 kg (209 lb)   04/11/24 108 kg (238 lb)   01/08/24 110 kg (242 lb)   10/05/23 112 kg (247 lb)   09/05/23 112 kg (246 lb)     There is no height or weight on file to calculate BMI.        Last Labs:  CMP:  Recent Labs     08/22/24  1245 04/11/24  1029 01/08/24  1426    138 138   K 5.1 5.7* 5.1    104 102   CO2 17* 22 24   ANIONGAP 21* 18 17   BUN 32* 44* 42*   CREATININE 1.85* 2.43* 2.19*   EGFR 38* 27* 31*   GLUCOSE 150* 177* 230*     Recent Labs     08/22/24  1245 04/11/24  1029 01/08/24  1426 06/20/23  1224 02/25/23  1055 04/19/22  1140 06/10/21  1401   ALBUMIN 4.3 4.2 4.4   < > 4.2 4.4 4.3   ALKPHOS  --   --   --   --  45 42 50   ALT  --   --   --   --  21 24 36   AST  --   --   --   --  20 21 28   BILITOT  --   --   --   --  0.3 0.4 0.4    < > = values in this interval not displayed.     CBC:  Recent Labs     06/20/23  1224 02/25/23  1055 04/19/22  1140   WBC 7.6 7.6 7.1   HGB 15.2 13.9 13.1*   HCT 48.2 45.1 42.1    207 210   MCV 93 89.5 81     COAG: No results for input(s): \"INR\", \"DDIMERVTE\" in the last 26023 hours.  ENDO:  Recent Labs     08/22/24  1245 01/08/24  1426 10/05/23  1455 06/20/23  1224 09/13/21  1420 06/10/21  1401   TSH  --   --   --   --   --  1.72   HGBA1C 7.8* 8.0* 8.0* 8.3*   < > 10.8    < > = values in this interval not displayed.      CARDIAC: No results for input(s): \"LDH\", \"CKMB\", \"TROPHS\", \"BNP\" in the last 18954 hours.    No lab exists for component: \"CK\", \"CKMBP\"  Recent Labs     04/11/24  1029 06/20/23  1224 02/25/23  1055 04/19/22  1140 06/10/21  1401   CHOL 181 135 125* 153 153   LDLF  --  -  --  - -   LDLCALC  --   --  Unable to report calculated LDL due to increased " triglycerides. Direct  --   --    HDL 34.2 35.1* 31* 39.4* 35.9*   TRIG 762* 532* 480* 404* 591*                 Assessment/Plan   75 y/o with h/o mild nonobstructive CAD on cath 2015 diabetes, hypertension , dyslipidemia, hypertriglyceridemia, obesity     1. Coronary artery disease  -High CAC score in 2015, but left heart cath with trivial nonobstructive disease  -He has persistent dyspnea with exertion since catheterization at that time which is COPD related  -stress echo 3/2020 negative for ischemia, suboptimal imaging.   -LDL goal <70 but frequently not calculated due to hypertriglyceridemia. Recheck with direct LDL  -continue aspirin     2. Hypertriglyceridemia   Still uncontrolled despite fenofibrate, statin  Could not afford Vascepa  Advised over-the-counter high omega-3's in past which he has yet to take     3. DM - HgA1c a 8.3%. Follow up with Dr. Encinas  4. HTN - good control on current regimen of atenolol, lisinopril/HCTZ 20/12.5. Had palpitations with amlodipine.  5. COPD - moderate obstruction on PFTs. inhaler rx by PCP.              Followup Appts:  Future Appointments   Date Time Provider Department Center   9/3/2024  1:40 PM Isaias Willams DO [id number] [facility]   11/22/2024 11:45 AM Jeremías Encinas DO [id number] [facility]   2/27/2025  1:30 PM Magdi Call MD [id number] [facility]           ____________________________________________________________  Isaias Willams DO  [facility]  [facility]

## 2024-09-03 ENCOUNTER — OFFICE VISIT (OUTPATIENT)
Dept: CARDIOLOGY | Facility: CLINIC | Age: 74
End: 2024-09-03
Payer: COMMERCIAL

## 2024-09-03 ENCOUNTER — APPOINTMENT (OUTPATIENT)
Dept: PRIMARY CARE | Facility: CLINIC | Age: 74
End: 2024-09-03
Payer: COMMERCIAL

## 2024-09-03 ENCOUNTER — APPOINTMENT (OUTPATIENT)
Dept: CARDIOLOGY | Facility: CLINIC | Age: 74
End: 2024-09-03
Payer: COMMERCIAL

## 2024-09-03 VITALS
SYSTOLIC BLOOD PRESSURE: 110 MMHG | OXYGEN SATURATION: 98 % | BODY MASS INDEX: 30.13 KG/M2 | DIASTOLIC BLOOD PRESSURE: 62 MMHG | WEIGHT: 210 LBS | HEART RATE: 71 BPM

## 2024-09-03 DIAGNOSIS — E78.1 HYPERTRIGLYCERIDEMIA: Primary | ICD-10-CM

## 2024-09-03 DIAGNOSIS — I10 BENIGN ESSENTIAL HYPERTENSION: ICD-10-CM

## 2024-09-03 DIAGNOSIS — R93.1 AGATSTON CORONARY ARTERY CALCIUM SCORE GREATER THAN 400: ICD-10-CM

## 2024-09-03 PROCEDURE — 1036F TOBACCO NON-USER: CPT | Performed by: INTERNAL MEDICINE

## 2024-09-03 PROCEDURE — 1126F AMNT PAIN NOTED NONE PRSNT: CPT | Performed by: INTERNAL MEDICINE

## 2024-09-03 PROCEDURE — 3051F HG A1C>EQUAL 7.0%<8.0%: CPT | Performed by: INTERNAL MEDICINE

## 2024-09-03 PROCEDURE — 1123F ACP DISCUSS/DSCN MKR DOCD: CPT | Performed by: INTERNAL MEDICINE

## 2024-09-03 PROCEDURE — 3060F POS MICROALBUMINURIA REV: CPT | Performed by: INTERNAL MEDICINE

## 2024-09-03 PROCEDURE — 99214 OFFICE O/P EST MOD 30 MIN: CPT | Performed by: INTERNAL MEDICINE

## 2024-09-03 PROCEDURE — G2211 COMPLEX E/M VISIT ADD ON: HCPCS | Performed by: INTERNAL MEDICINE

## 2024-09-03 PROCEDURE — 3074F SYST BP LT 130 MM HG: CPT | Performed by: INTERNAL MEDICINE

## 2024-09-03 PROCEDURE — 3078F DIAST BP <80 MM HG: CPT | Performed by: INTERNAL MEDICINE

## 2024-09-03 PROCEDURE — 1159F MED LIST DOCD IN RCRD: CPT | Performed by: INTERNAL MEDICINE

## 2024-09-03 ASSESSMENT — PAIN SCALES - GENERAL: PAINLEVEL: 0-NO PAIN

## 2024-09-03 NOTE — PROGRESS NOTES
Primary Care Physician: Jeremías Encinas DO  Date of Visit: 2024  1:20 PM EDT  Location of visit: [facility] [address]     Chief Complaint:   1 yr Follow up visit       HPI / Summary:   73 y/o with h/o CAD based on CAC score 2015 over 1000 and mild CAD seen on catheterization at that time, diabetes, hypertension , dyslipidemia, hypertriglyceridemia, obesity, COPD     Interval events:  [facility] hospitalization for neck necrotizing fasciitis after dental extraction.  Required 3 total operations.  Lost a substantial amount of weight.  Feels like recovering well    Denies any chest pain or tightness.  No significant dyspnea.  Cardiac wise tolerated multiple surgeries well.  Continues to ambulate but with a cane     PFTs 2021 - moderate obstruction with normal DLCO         Last Cardiology Tests:  EC2024: NSR, low voltage    Echo: 24   Exam Indication: Hypoxia and Evaluation of cardiac function     _  Point-of-care Transthoracic Echocardiogram.   _  The left ventriclular size is normal. Left ventricular systolic function is      normal. No regional wall motion abnormalities.   _  The right ventricular size is normal. Right ventricular systolic function is      normal. Interventricular septal motion is normal.   _  There is no pericardial effusion. There is not evidence of cardiac tamponade.     10/2022:  1. Left ventricular systolic function is normal with a 60-65% estimated ejection fraction.   2. No valvular abnormalities.      Cath:  na    Stress Test:  exercise stress echo 3/2020:   1. No clear evidence of echocardiographic ischemia but imaging limited by off axis views.  2. Below average functional capacity at 6.6 METs.    Cardiac Imaging:    Past Medical History:  Past Medical History:   Diagnosis Date    Atherosclerotic heart disease of native coronary artery with unspecified angina pectoris (CMS-HCC)     Coronary artery disease involving native heart with angina pectoris, unspecified vessel or lesion  type    Atherosclerotic heart disease of native coronary artery with unspecified angina pectoris (CMS-HCC)     Coronary artery disease involving native heart with angina pectoris, unspecified vessel or lesion type    Other specified diabetes mellitus without complications (Multi)     Diabetes mellitus of other type without complication    Other specified diabetes mellitus without complications (Multi)     Diabetes mellitus of other type without complication    Personal history of diseases of the blood and blood-forming organs and certain disorders involving the immune mechanism     History of autoimmune disorder    Personal history of other diseases of the circulatory system     History of hypertension    Personal history of other diseases of the circulatory system     History of hypertension    Unspecified abnormal findings in urine 12/10/2014    Abnormal urine findings        Past Surgical History:  Past Surgical History:   Procedure Laterality Date    KIDNEY SURGERY  07/23/2015    Kidney Surgery    OTHER SURGICAL HISTORY  02/28/2022    Kidney surgery          Social History:  He reports that he has quit smoking. His smoking use included cigarettes. He has been exposed to tobacco smoke. He has never used smokeless tobacco. He reports current alcohol use. He reports that he does not use drugs.    Family History:  family history is not on file.      Allergies:  No Known Allergies    Outpatient Medications:  Current Outpatient Medications   Medication Instructions    atenolol (TENORMIN) 50 mg, oral, Daily    DULoxetine (CYMBALTA) 60 mg, oral, Daily    ezetimibe (ZETIA) 10 mg, oral, Daily    fenofibrate (TRIGLIDE) 160 mg, oral, Daily    fluticasone-umeclidin-vilanter (Trelegy Ellipta) 200-62.5-25 mcg blister with device 1 puff, inhalation, Daily before breakfast    glimepiride (AMARYL) 2 mg, oral, 2 times daily    Jardiance 25 mg, oral, Daily    ketoconazole (NIZOral) 2 % cream Topical, 2 times daily     "losartan-hydrochlorothiazide (Hyzaar) 100-25 mg tablet 1 tablet, oral, Daily    montelukast (SINGULAIR) 10 mg, oral, Nightly    Mounjaro 12.5 mg, subcutaneous, Every 7 days    pravastatin (PRAVACHOL) 40 mg, oral, Daily       Physical Exam:  GENERAL: alert, cooperative, pleasant, in no acute distress  SKIN: warm, dry, no rash.  NECK: no JVD, no TAMI, I did look at his right jaw surgical incision.  No erythema or discharge.  CARDIAC: Regular rate and rhythm with no rubs, murmurs, or gallops  CHEST: Normal respiratory efforts, lungs clear to auscultation bilaterally.  ABDOMEN: soft, nontender, nondistended  EXTREMITIES: no edema  NEURO: Alert and oriented x 3.  Grossly normal.  Moves all 4 extremities.    Vitals:    09/03/24 1346   BP: 95/62   BP Location: Left arm   Patient Position: Sitting   Pulse: 71   SpO2: 98%   Weight: 95.3 kg (210 lb)     Wt Readings from Last 5 Encounters:   09/03/24 95.3 kg (210 lb)   08/22/24 94.8 kg (209 lb)   04/11/24 108 kg (238 lb)   01/08/24 110 kg (242 lb)   10/05/23 112 kg (247 lb)     Body mass index is 30.13 kg/m².        Last Labs:  CMP:  Recent Labs     08/22/24  1245 04/11/24  1029 01/08/24  1426    138 138   K 5.1 5.7* 5.1    104 102   CO2 17* 22 24   ANIONGAP 21* 18 17   BUN 32* 44* 42*   CREATININE 1.85* 2.43* 2.19*   EGFR 38* 27* 31*   GLUCOSE 150* 177* 230*     Recent Labs     08/22/24  1245 04/11/24  1029 01/08/24  1426 06/20/23  1224 02/25/23  1055 04/19/22  1140 06/10/21  1401   ALBUMIN 4.3 4.2 4.4   < > 4.2 4.4 4.3   ALKPHOS  --   --   --   --  45 42 50   ALT  --   --   --   --  21 24 36   AST  --   --   --   --  20 21 28   BILITOT  --   --   --   --  0.3 0.4 0.4    < > = values in this interval not displayed.     CBC:  Recent Labs     06/20/23  1224 02/25/23  1055 04/19/22  1140   WBC 7.6 7.6 7.1   HGB 15.2 13.9 13.1*   HCT 48.2 45.1 42.1    207 210   MCV 93 89.5 81     COAG: No results for input(s): \"INR\", \"DDIMERVTE\" in the last 91724 " "hours.  ENDO:  Recent Labs     08/22/24  1245 01/08/24  1426 10/05/23  1455 06/20/23  1224 09/13/21  1420 06/10/21  1401   TSH  --   --   --   --   --  1.72   HGBA1C 7.8* 8.0* 8.0* 8.3*   < > 10.8    < > = values in this interval not displayed.      CARDIAC: No results for input(s): \"LDH\", \"CKMB\", \"TROPHS\", \"BNP\" in the last 63753 hours.    No lab exists for component: \"CK\", \"CKMBP\"  Recent Labs     04/11/24  1029 06/20/23  1224 02/25/23  1055 04/19/22  1140 06/10/21  1401   CHOL 181 135 125* 153 153   LDLF  --  -  --  - -   LDLCALC  --   --  Unable to report calculated LDL due to increased triglycerides. Direct  --   --    HDL 34.2 35.1* 31* 39.4* 35.9*   TRIG 762* 532* 480* 404* 591*           Assessment/Plan   73 y/o with h/o mild nonobstructive CAD on cath 2015 diabetes, hypertension , dyslipidemia, hypertriglyceridemia, obesity, CKD 3     1. Coronary artery disease  -High CAC score in 2015, but left heart cath with trivial nonobstructive disease  -He has persistent dyspnea with exertion since catheterization at that time which is COPD related  -stress echo 3/2020 negative for ischemia, suboptimal imaging.   -LDL goal <70 but frequently not calculated due to hypertriglyceridemia. Recheck with direct LDL  -continue aspirin     2. Hypertriglyceridemia   Still uncontrolled despite fenofibrate, statin  Could not afford Vascepa  Advised over-the-counter high omega-3's several times in the past which he has neglected purchasing.  Also says cannot get into his computer as he forgot his pain code postsurgery.  Now with so much weight loss after neck nec fasc will recheck lipids     3. DM - HgA1c a 7.8%.  4. HTN - good control on current regimen of atenolol, losartan/hydrochlorothiazide   5. COPD - moderate obstruction on PFTs. inhaler rx by PCP.        Follow up 1 year      Followup Appts:  Future Appointments   Date Time Provider Department Center   11/22/2024 11:45 AM Jeremías Encinas DO [id number] [facility]   2/27/2025  " 1:30 PM Magdi Call MD [id number] [facility]           ____________________________________________________________  [facility]

## 2024-09-04 ENCOUNTER — TELEPHONE (OUTPATIENT)
Dept: PRIMARY CARE | Facility: CLINIC | Age: 74
End: 2024-09-04
Payer: COMMERCIAL

## 2024-09-04 DIAGNOSIS — R93.1 AGATSTON CORONARY ARTERY CALCIUM SCORE GREATER THAN 400: Primary | ICD-10-CM

## 2024-09-05 RX ORDER — LOSARTAN POTASSIUM AND HYDROCHLOROTHIAZIDE 25; 100 MG/1; MG/1
1 TABLET ORAL DAILY
Qty: 90 TABLET | Refills: 1 | Status: SHIPPED | OUTPATIENT
Start: 2024-09-05

## 2024-09-05 RX ORDER — GEMFIBROZIL 600 MG/1
600 TABLET, FILM COATED ORAL 2 TIMES DAILY
Qty: 180 TABLET | Refills: 1 | Status: SHIPPED | OUTPATIENT
Start: 2024-09-05 | End: 2025-03-04

## 2024-09-06 DIAGNOSIS — E11.9 TYPE 2 DIABETES MELLITUS WITHOUT COMPLICATION, WITHOUT LONG-TERM CURRENT USE OF INSULIN (MULTI): ICD-10-CM

## 2024-09-06 DIAGNOSIS — E78.5 DYSLIPIDEMIA: ICD-10-CM

## 2024-09-06 RX ORDER — GLIMEPIRIDE 2 MG/1
2 TABLET ORAL 2 TIMES DAILY
Qty: 200 TABLET | Refills: 1 | Status: SHIPPED | OUTPATIENT
Start: 2024-09-06

## 2024-09-06 RX ORDER — PRAVASTATIN SODIUM 40 MG/1
40 TABLET ORAL DAILY
Qty: 100 TABLET | Refills: 1 | Status: SHIPPED | OUTPATIENT
Start: 2024-09-06

## 2024-09-10 ENCOUNTER — LAB (OUTPATIENT)
Dept: LAB | Facility: LAB | Age: 74
End: 2024-09-10
Payer: COMMERCIAL

## 2024-09-10 DIAGNOSIS — E78.1 HYPERTRIGLYCERIDEMIA: ICD-10-CM

## 2024-09-10 PROCEDURE — 36415 COLL VENOUS BLD VENIPUNCTURE: CPT

## 2024-09-10 PROCEDURE — 80061 LIPID PANEL: CPT

## 2024-09-10 PROCEDURE — 83721 ASSAY OF BLOOD LIPOPROTEIN: CPT

## 2024-09-11 ENCOUNTER — TELEPHONE (OUTPATIENT)
Dept: CARDIOLOGY | Facility: CLINIC | Age: 74
End: 2024-09-11
Payer: COMMERCIAL

## 2024-09-11 DIAGNOSIS — R93.1 AGATSTON CORONARY ARTERY CALCIUM SCORE GREATER THAN 400: Primary | ICD-10-CM

## 2024-09-11 LAB
CHOLEST SERPL-MCNC: 182 MG/DL (ref 0–199)
CHOLESTEROL/HDL RATIO: 5.4
HDLC SERPL-MCNC: 33.5 MG/DL
LDLC SERPL CALC-MCNC: ABNORMAL MG/DL
LDLC SERPL DIRECT ASSAY-MCNC: 96 MG/DL (ref 0–129)
NON HDL CHOLESTEROL: 149 MG/DL (ref 0–149)
TRIGL SERPL-MCNC: 561 MG/DL (ref 0–149)
VLDL: ABNORMAL

## 2024-09-11 NOTE — TELEPHONE ENCOUNTER
----- Message from Isaias Willams sent at 9/11/2024 10:19 AM EDT -----  LDL of 96 suboptimal.  Please be sure he is taking pravastatin 40 mg.  If he is we should increase to 80 mg

## 2024-09-12 ENCOUNTER — TELEPHONE (OUTPATIENT)
Dept: PRIMARY CARE | Facility: CLINIC | Age: 74
End: 2024-09-12

## 2024-09-12 RX ORDER — PRAVASTATIN SODIUM 80 MG/1
80 TABLET ORAL DAILY
Qty: 90 TABLET | Refills: 3 | Status: SHIPPED | OUTPATIENT
Start: 2024-09-12 | End: 2025-09-12

## 2024-09-12 NOTE — TELEPHONE ENCOUNTER
TCT patient and results reviewed. Patient confirmed he is taking pravastatin 40mg. Agreeable to increase to 80mg. Will double up on 40mg tabs and requests new prescription to mail order. Script cued and forwarded for signature

## 2024-09-13 ENCOUNTER — PATIENT OUTREACH (OUTPATIENT)
Dept: PRIMARY CARE | Facility: CLINIC | Age: 74
End: 2024-09-13
Payer: COMMERCIAL

## 2024-09-24 ENCOUNTER — APPOINTMENT (OUTPATIENT)
Dept: PHARMACY | Facility: HOSPITAL | Age: 74
End: 2024-09-24
Payer: COMMERCIAL

## 2024-09-24 DIAGNOSIS — J44.9 CHRONIC OBSTRUCTIVE PULMONARY DISEASE, UNSPECIFIED COPD TYPE (MULTI): ICD-10-CM

## 2024-09-24 DIAGNOSIS — J44.9 COPD, MILD (MULTI): ICD-10-CM

## 2024-09-24 DIAGNOSIS — E78.49 FAMILIAL COMBINED HYPERLIPIDEMIA: Primary | ICD-10-CM

## 2024-09-24 DIAGNOSIS — E11.9 TYPE 2 DIABETES MELLITUS WITHOUT COMPLICATION, WITHOUT LONG-TERM CURRENT USE OF INSULIN (MULTI): ICD-10-CM

## 2024-09-24 DIAGNOSIS — N18.4 CHRONIC RENAL DISEASE, STAGE IV (MULTI): ICD-10-CM

## 2024-09-24 DIAGNOSIS — J42 CHRONIC BRONCHITIS, UNSPECIFIED CHRONIC BRONCHITIS TYPE (MULTI): ICD-10-CM

## 2024-09-24 PROCEDURE — RXMED WILLOW AMBULATORY MEDICATION CHARGE

## 2024-09-24 RX ORDER — TIRZEPATIDE 15 MG/.5ML
15 INJECTION, SOLUTION SUBCUTANEOUS
Qty: 2 ML | Refills: 1 | Status: SHIPPED | OUTPATIENT
Start: 2024-09-24

## 2024-09-24 RX ORDER — ICOSAPENT ETHYL 1 G/1
2 CAPSULE ORAL 2 TIMES DAILY
Qty: 360 CAPSULE | Refills: 1 | Status: SHIPPED | OUTPATIENT
Start: 2024-09-24 | End: 2025-03-23

## 2024-09-24 RX ORDER — FLUTICASONE FUROATE, UMECLIDINIUM BROMIDE AND VILANTEROL TRIFENATATE 200; 62.5; 25 UG/1; UG/1; UG/1
1 POWDER RESPIRATORY (INHALATION)
Qty: 180 EACH | Refills: 3 | Status: SHIPPED | OUTPATIENT
Start: 2024-09-24

## 2024-09-24 NOTE — PROGRESS NOTES
"  Clinical Pharmacy Appointment    Patient ID: Derrek Messina \"Anabelle" is a 74 y.o. male who presents for COPD and Diabetes.    Pt is here for Follow Up appointment.     Referring Provider: Jeremías Encinas DO  PCP: Jeremías Encinas DO   Last visit with PCP: 8/29/24   Next visit with PCP: 11/22/24    Subjective     Interval History  Patient was hospitalized for necrotizing fascitis in the neck after dental work in July. Then went to rehab.   Lost about 20 lbs.    HPI  DIABETES MELLITUS TYPE 2:    Known diabetic complications: CKD.  Does patient follow with Endocrinology: No  Last optometry exam: 2/22/24  Most recent visit in Podiatry: N/A-- patient denies sores or cuts on feet today      Current diabetic medications include:  Mounjaro 12.5 mg weekly (Saturday)  Glimepiride 2 mg twice daily  Jardiance 25 mg daily    Clarifications to above regimen: None  Adverse Effects: None    Past diabetic medications include:  Metformin (discontinued due to kidney function)    Glucose Readings:  Glucometer/CGM Type: Dexcom G7  Patient tests BG multiple times per day    Current home BG readings: 14-day average of 165, GMI 7.3; 30-day average of 164, GMI 7.2  Previous home BG readings: 14-day average of 189    Any episodes of hypoglycemia? No.  Did patient treat episode of hypoglycemia appropriately? N/A  Does the patient have a prescription for ready-to-use Glucagon? Not on insulin  Does pt have proteinuria? Yes    Lifestyle:  Breakfast: Multigrain bread or multiwheat English muffin or brown rice a 2-3 times a week; oranges  Dinner: pasta, red meat occasionally, chicken, peas, carrots, celery  Drinks: Water, Diet Pepsi occasionally, 2% milk, black coffee  Physical Activity: has been doing PT for the past month.  Usually does yard work and treadmill. He has been on the treadmill 15-20 minutes at least 3 times a week.     Secondary Prevention:  Statin? Yes  ACE-I/ARB? Yes  Aspirin? No    Pertinent PMH Review:  PMH of Pancreatitis: " No  PMH of Retinopathy: No  PMH of Urinary Tract Infections: No  PMH of MTC: No    COPD  Patient has been diagnosed with: Emphysema  Does patient see pulmonology: No  has had PFT's completed in last 2 years    Current Regimen  Trelegy 200-62.5-25 mcg one puff daily    Clarifications to above regimen: None   Adverse Effects: Some hoarseness   Appropriate technique? Yes    Historical Treatment  Breo    Symptom Management  Current symptoms:  None  Triggers: Dust, pollen  Alleviating factors: Rest    Exacerbation Hx  When was your last hospitalization for an exacerbation? N/A  When was the last time you were treated with antibiotics and/or steroids? N/A     Rescue Inhaler Use  How often do you use your rescue inhaler? None    Immunization History:  Influenza: Receives annually, but will receive at next PCP appt  PCV13: 12/3/18  PPSV23: 5/3/19  PCV20: N/A  COVID: 10/9/23  RSV: N/A    Smoking History  Former smoker    HYPERLIPIDEMIA ASSESSMENT  Primary prevention; The 10-year ASCVD risk score (Alex LIEBERMAN, et al., 2019) is: 40.2%    Values used to calculate the score:      Age: 74 years      Sex: Male      Is Non- : No      Diabetic: Yes      Tobacco smoker: No      Systolic Blood Pressure: 110 mmHg      Is BP treated: Yes      HDL Cholesterol: 33.5 mg/dL      Total Cholesterol: 182 mg/dL     LDL Goal: <70 mg/dL    Medication Therapy  Current Medications:  Pravastatin 80 mg daily at bedtime  Fenofibrate 160 mg daily  Ezetimibe 10 mg daily  Adverse Effects: None  Previous Medications: gemfibrozil     Drug Interactions  No relevant drug interactions were noted.    Medication System Management  Patient's preferred pharmacy: [facility] and [facility] for  PAP medications  Adherence/Organization: No issues  Affordability/Accessibility: Jardiance, Mounjaro, and Trelegy covered by [facility]    Objective   No Known Allergies  Social History     Social History Narrative    Not on  file      Medication Review  Current Outpatient Medications   Medication Instructions    atenolol (TENORMIN) 50 mg, oral, Daily    DULoxetine (CYMBALTA) 60 mg, oral, Daily    empagliflozin (JARDIANCE) 25 mg, oral, Daily    ezetimibe (ZETIA) 10 mg, oral, Daily    fenofibrate (TRIGLIDE) 160 mg, oral, Daily    fluticasone-umeclidin-vilanter (Trelegy Ellipta) 200-62.5-25 mcg blister with device 1 puff, inhalation, Daily before breakfast    glimepiride (AMARYL) 2 mg, oral, 2 times daily    icosapent ethyL (VASCEPA) 2 g, oral, 2 times daily    ketoconazole (NIZOral) 2 % cream Topical, 2 times daily    losartan-hydrochlorothiazide (Hyzaar) 100-25 mg tablet 1 tablet, oral, Daily    montelukast (SINGULAIR) 10 mg, oral, Nightly    Mounjaro 15 mg, subcutaneous, Every 7 days    pravastatin (PRAVACHOL) 80 mg, oral, Daily      Vitals  BP Readings from Last 2 Encounters:   09/03/24 110/62   08/29/24 133/63     BMI Readings from Last 1 Encounters:   09/03/24 30.13 kg/m²      Labs  A1C  Lab Results   Component Value Date    HGBA1C 7.8 (H) 08/22/2024    HGBA1C 8.0 (H) 01/08/2024    HGBA1C 8.0 (H) 10/05/2023     BMP  Lab Results   Component Value Date    CALCIUM 9.9 08/22/2024     08/22/2024    K 5.1 08/22/2024    CO2 17 (L) 08/22/2024     08/22/2024    BUN 32 (H) 08/22/2024    CREATININE 1.85 (H) 08/22/2024    EGFR 38 (L) 08/22/2024     LFTs  Lab Results   Component Value Date    ALT 21 02/25/2023    AST 20 02/25/2023    ALKPHOS 45 02/25/2023    BILITOT 0.3 02/25/2023     FLP  Lab Results   Component Value Date    TRIG 561 (H) 09/10/2024    CHOL 182 09/10/2024    LDLF - 06/20/2023    LDLCALC  09/10/2024      Comment:      The calculation of LDL and VLDL are inaccurate when the Triglycerides are greater than 400 mg/dL or when the patient is non-fasting. If LDL measurement is necessary contact the testing laboratory for an alternative LDL assay.                                  Near   Borderline      AGE      Desirable   Optimal    High     High     Very High     0-19 Y     0 - 109     ---    110-129   >/= 130     ----    20-24 Y     0 - 119     ---    120-159   >/= 160     ----      >24 Y     0 -  99   100-129  130-159   160-189     >/=190      HDL 33.5 09/10/2024     Urine Microalbumin  Lab Results   Component Value Date    MICROALBCREA 262.4 (H) 04/11/2024     Weight Management  Wt Readings from Last 3 Encounters:   09/03/24 95.3 kg (210 lb)   08/22/24 94.8 kg (209 lb)   04/11/24 108 kg (238 lb)      There is no height or weight on file to calculate BMI.     Assessment/Plan   Problem List Items Addressed This Visit       Chronic bronchitis, unspecified chronic bronchitis type (Multi)    COPD, mild (Multi)     Stable. A little hoarse since starting Trelegy, but not bothersome. Due for flu vaccine. Recommended RSV vaccine as well.         Diabetes mellitus (Multi)     Patient's diabetes needs improvement with most recent A1c of 7.8% (Goal < 7%) on 8/22/24. 14- and 30-day average from Dexcom shows blood sugars have improved since June. Still no side effects with Mounjaro.  Increase: Mounjaro from 12.5 mg to 15 mg weekly   Continue Jardiance 25 mg daily and glimepiride 2 mg twice daily.  Continue: glimepiride 2 mg twice daily         Relevant Medications    tirzepatide (Mounjaro) 15 mg/0.5 mL pen injector    empagliflozin (Jardiance) 25 mg    Other Relevant Orders    Follow Up In [facility] - Pharmacy    Familial combined hyperlipidemia - Primary     Uncontrolled with last clinic  and LDL 96 on 9/10/24. Reached out to Dr. Willams to start Vascepa (cost was a barrier in the past, but patient is covered under  PAP).    Rationale for plan: Triglycerides not at goal    Medication Changes:  CONTINUE  Pravastatin 80 mg daily (recently increased from 40 mg)  Ezetimibe 10 mg daily  Fenofibrate 160 mg daily  START  Vascepa 1 g two capsules twice daily    Education and Monitoring:  Goal LDL: <70 mg/dL  Goal triglycerides:  <150 mg/dL  Focus on implementing dietary changes and exercise as discussed.  Recheck lipid panel in 4-12 weeks.          Relevant Medications    icosapent ethyL (Vascepa) 1 gram capsule    Other Relevant Orders    Follow Up In Clinical Pharmacy     Other Visit Diagnoses       Chronic obstructive pulmonary disease, unspecified COPD type (Multi)        Relevant Medications    fluticasone-umeclidin-vilanter (Trelegy Ellipta) 200-62.5-25 mcg blister with device    Chronic renal disease, stage IV (Multi)        Relevant Medications    empagliflozin (Jardiance) 25 mg          Clinical Pharmacist follow-up: 10/29/24, Telehealth visit    Continue all meds under the continuation of care with the referring provider and clinical pharmacy team.    Thank you,  Lnih Carolina  Clinical Pharmacist  [phone]    Verbal consent to manage patient's drug therapy was obtained from the patient. They were informed they may decline to participate or withdraw from participation in pharmacy services at any time.

## 2024-09-24 NOTE — ASSESSMENT & PLAN NOTE
Uncontrolled with last clinic  and LDL 96 on 9/10/24. Reached out to Dr. Willams to start Vascepa (cost was a barrier in the past, but patient is covered under  PAP).    Rationale for plan: Triglycerides not at goal    Medication Changes:  CONTINUE  Pravastatin 80 mg daily (recently increased from 40 mg)  Ezetimibe 10 mg daily  Fenofibrate 160 mg daily  START  Vascepa 1 g two capsules twice daily    Education and Monitoring:  Goal LDL: <70 mg/dL  Goal triglycerides: <150 mg/dL  Focus on implementing dietary changes and exercise as discussed.  Recheck lipid panel in 4-12 weeks.

## 2024-09-24 NOTE — ASSESSMENT & PLAN NOTE
Stable. A little hoarse since starting Trelegy, but not bothersome. Due for flu vaccine. Recommended RSV vaccine as well.

## 2024-09-24 NOTE — ASSESSMENT & PLAN NOTE
Patient's diabetes needs improvement with most recent A1c of 7.8% (Goal < 7%) on 8/22/24. 14- and 30-day average from Dexcom shows blood sugars have improved since June. Still no side effects with Mounjaro.  Increase: Mounjaro from 12.5 mg to 15 mg weekly   Continue Jardiance 25 mg daily and glimepiride 2 mg twice daily.  Continue: glimepiride 2 mg twice daily

## 2024-09-25 ENCOUNTER — PHARMACY VISIT (OUTPATIENT)
Dept: PHARMACY | Facility: CLINIC | Age: 74
End: 2024-09-25
Payer: COMMERCIAL

## 2024-09-26 ENCOUNTER — PHARMACY VISIT (OUTPATIENT)
Dept: PHARMACY | Facility: CLINIC | Age: 74
End: 2024-09-26

## 2024-10-01 ENCOUNTER — APPOINTMENT (OUTPATIENT)
Dept: CARDIOLOGY | Facility: CLINIC | Age: 74
End: 2024-10-01
Payer: COMMERCIAL

## 2024-10-05 PROCEDURE — RXMED WILLOW AMBULATORY MEDICATION CHARGE

## 2024-10-09 ENCOUNTER — PHARMACY VISIT (OUTPATIENT)
Dept: PHARMACY | Facility: CLINIC | Age: 74
End: 2024-10-09
Payer: COMMERCIAL

## 2024-10-29 ENCOUNTER — APPOINTMENT (OUTPATIENT)
Dept: PHARMACY | Facility: HOSPITAL | Age: 74
End: 2024-10-29
Payer: COMMERCIAL

## 2024-10-29 DIAGNOSIS — E11.9 TYPE 2 DIABETES MELLITUS WITHOUT COMPLICATION, WITHOUT LONG-TERM CURRENT USE OF INSULIN (MULTI): ICD-10-CM

## 2024-10-29 PROCEDURE — RXMED WILLOW AMBULATORY MEDICATION CHARGE

## 2024-10-29 RX ORDER — TIRZEPATIDE 15 MG/.5ML
15 INJECTION, SOLUTION SUBCUTANEOUS
Qty: 6 ML | Refills: 1 | Status: SHIPPED | OUTPATIENT
Start: 2024-10-29

## 2024-10-31 ENCOUNTER — PHARMACY VISIT (OUTPATIENT)
Dept: PHARMACY | Facility: CLINIC | Age: 74
End: 2024-10-31
Payer: COMMERCIAL

## 2024-11-04 ENCOUNTER — PATIENT OUTREACH (OUTPATIENT)
Dept: PRIMARY CARE | Facility: CLINIC | Age: 74
End: 2024-11-04
Payer: COMMERCIAL

## 2024-11-05 ENCOUNTER — PATIENT OUTREACH (OUTPATIENT)
Dept: PRIMARY CARE | Facility: CLINIC | Age: 74
End: 2024-11-05
Payer: COMMERCIAL

## 2024-11-05 NOTE — PROGRESS NOTES
Discharge Facility:[facility]  Discharge Diagnosis:Obstructive Uropathy with Sepsis   Admission Date:10/31/24  Discharge Date: 11/3/24    PCP Appointment Date:task to office staff  Specialist Appointment Date: N/A  Hospital Encounter and Summary Linked: Yes  See discharge assessment below for further details      Two attempts were made to reach patient within two business days after discharge. Voicemail left with contact information for patient to call back with any non-emergent questions or concerns.      Phoebe Mora LPN

## 2024-11-13 DIAGNOSIS — R93.1 AGATSTON CORONARY ARTERY CALCIUM SCORE GREATER THAN 400: ICD-10-CM

## 2024-11-13 RX ORDER — LOSARTAN POTASSIUM AND HYDROCHLOROTHIAZIDE 25; 100 MG/1; MG/1
1 TABLET ORAL DAILY
Qty: 100 TABLET | Refills: 0 | Status: SHIPPED | OUTPATIENT
Start: 2024-11-13

## 2024-11-14 ENCOUNTER — LAB (OUTPATIENT)
Dept: LAB | Facility: LAB | Age: 74
End: 2024-11-14
Payer: COMMERCIAL

## 2024-11-14 ENCOUNTER — APPOINTMENT (OUTPATIENT)
Dept: PRIMARY CARE | Facility: CLINIC | Age: 74
End: 2024-11-14
Payer: COMMERCIAL

## 2024-11-14 VITALS
SYSTOLIC BLOOD PRESSURE: 138 MMHG | DIASTOLIC BLOOD PRESSURE: 77 MMHG | HEART RATE: 96 BPM | BODY MASS INDEX: 29.84 KG/M2 | WEIGHT: 208 LBS | OXYGEN SATURATION: 96 %

## 2024-11-14 DIAGNOSIS — N18.30 CKD STAGE 3 SECONDARY TO DIABETES (MULTI): Primary | ICD-10-CM

## 2024-11-14 DIAGNOSIS — E11.22 CKD STAGE 3 SECONDARY TO DIABETES (MULTI): Primary | ICD-10-CM

## 2024-11-14 DIAGNOSIS — Z23 NEEDS FLU SHOT: ICD-10-CM

## 2024-11-14 DIAGNOSIS — E11.22 CKD STAGE 3 SECONDARY TO DIABETES (MULTI): ICD-10-CM

## 2024-11-14 DIAGNOSIS — N20.0 NEPHROLITHIASIS: ICD-10-CM

## 2024-11-14 DIAGNOSIS — N18.30 CKD STAGE 3 SECONDARY TO DIABETES (MULTI): ICD-10-CM

## 2024-11-14 LAB
ALBUMIN SERPL BCP-MCNC: 4.3 G/DL (ref 3.4–5)
ANION GAP SERPL CALC-SCNC: 18 MMOL/L (ref 10–20)
BUN SERPL-MCNC: 65 MG/DL (ref 6–23)
CALCIUM SERPL-MCNC: 10.1 MG/DL (ref 8.6–10.6)
CHLORIDE SERPL-SCNC: 107 MMOL/L (ref 98–107)
CO2 SERPL-SCNC: 18 MMOL/L (ref 21–32)
CREAT SERPL-MCNC: 3.42 MG/DL (ref 0.5–1.3)
EGFRCR SERPLBLD CKD-EPI 2021: 18 ML/MIN/1.73M*2
GLUCOSE SERPL-MCNC: 110 MG/DL (ref 74–99)
PHOSPHATE SERPL-MCNC: 3.7 MG/DL (ref 2.5–4.9)
POTASSIUM SERPL-SCNC: 6.2 MMOL/L (ref 3.5–5.3)
SODIUM SERPL-SCNC: 137 MMOL/L (ref 136–145)

## 2024-11-14 PROCEDURE — 3075F SYST BP GE 130 - 139MM HG: CPT | Performed by: INTERNAL MEDICINE

## 2024-11-14 PROCEDURE — 3048F LDL-C <100 MG/DL: CPT | Performed by: INTERNAL MEDICINE

## 2024-11-14 PROCEDURE — 3051F HG A1C>EQUAL 7.0%<8.0%: CPT | Performed by: INTERNAL MEDICINE

## 2024-11-14 PROCEDURE — 36415 COLL VENOUS BLD VENIPUNCTURE: CPT

## 2024-11-14 PROCEDURE — 90662 IIV NO PRSV INCREASED AG IM: CPT | Performed by: INTERNAL MEDICINE

## 2024-11-14 PROCEDURE — G0008 ADMIN INFLUENZA VIRUS VAC: HCPCS | Performed by: INTERNAL MEDICINE

## 2024-11-14 PROCEDURE — 80069 RENAL FUNCTION PANEL: CPT

## 2024-11-14 PROCEDURE — 1123F ACP DISCUSS/DSCN MKR DOCD: CPT | Performed by: INTERNAL MEDICINE

## 2024-11-14 PROCEDURE — 1160F RVW MEDS BY RX/DR IN RCRD: CPT | Performed by: INTERNAL MEDICINE

## 2024-11-14 PROCEDURE — 99495 TRANSJ CARE MGMT MOD F2F 14D: CPT | Performed by: INTERNAL MEDICINE

## 2024-11-14 PROCEDURE — 1036F TOBACCO NON-USER: CPT | Performed by: INTERNAL MEDICINE

## 2024-11-14 PROCEDURE — 3078F DIAST BP <80 MM HG: CPT | Performed by: INTERNAL MEDICINE

## 2024-11-14 PROCEDURE — 3060F POS MICROALBUMINURIA REV: CPT | Performed by: INTERNAL MEDICINE

## 2024-11-14 PROCEDURE — 1159F MED LIST DOCD IN RCRD: CPT | Performed by: INTERNAL MEDICINE

## 2024-11-14 ASSESSMENT — ENCOUNTER SYMPTOMS
VOMITING: 0
SPEECH DIFFICULTY: 0
AGITATION: 0
NAUSEA: 0
COUGH: 0
NERVOUS/ANXIOUS: 0
DIAPHORESIS: 0
FATIGUE: 0
HYPERACTIVE: 0
EYE DISCHARGE: 0
POLYPHAGIA: 0
ABDOMINAL DISTENTION: 0
ACTIVITY CHANGE: 0
DYSPHORIC MOOD: 0
FACIAL ASYMMETRY: 0
EYE REDNESS: 0
APPETITE CHANGE: 0
CONFUSION: 0
EYE PAIN: 0
CHEST TIGHTNESS: 0
LIGHT-HEADEDNESS: 0
SHORTNESS OF BREATH: 0
EYE ITCHING: 0
ARTHRALGIAS: 0
HEADACHES: 0
FACIAL SWELLING: 0
FEVER: 0
BLOOD IN STOOL: 0
JOINT SWELLING: 0
CHOKING: 0
NUMBNESS: 0
ABDOMINAL PAIN: 0
BACK PAIN: 0
POLYDIPSIA: 0
DIFFICULTY URINATING: 0

## 2024-11-14 NOTE — PROGRESS NOTES
"Patient: Derrek Messina \"Javier\"  : 1950  PCP: Jeremías Encinas DO  MRN: [id number]  Program: Miscellaneous Non-Core  Status: Enrolled  Effective Dates: 10/9/2023 - present  Responsible Staff: Magy Lofton  Social Drivers to be Addressed: No information to display    Transitional Care Management  Status: Enrolled  Effective Dates: 2024 - present  Responsible Staff: Phoebe Mora LPN  Social Drivers to be Addressed: Alcohol Use, Financial Resource Strain, Physical Activity, Social Connections, Stress, Tobacco Use, Transportation Needs         Derrek Messina \"Javier\" is a 74 y.o. male presenting today for follow-up after being discharged from the hospital 10 days ago. The main problem requiring admission was sepsis secondary to urinary tract infection and kidney stone. The discharge summary and/or Transitional Care Management documentation was reviewed. Medication reconciliation was performed as indicated via the \"Rock as Reviewed\" timestamp.     Derrek Messina \"Javier\" was contacted by Transitional Care Management services two days after his discharge. This encounter and supporting documentation was reviewed.    Review of Systems   Constitutional:  Negative for activity change, appetite change, diaphoresis, fatigue and fever.   HENT:  Negative for dental problem, drooling, ear pain, facial swelling, hearing loss and nosebleeds.    Eyes:  Negative for pain, discharge, redness and itching.   Respiratory:  Negative for cough, choking, chest tightness and shortness of breath.    Gastrointestinal:  Negative for abdominal distention, abdominal pain, blood in stool, nausea and vomiting.   Endocrine: Negative for cold intolerance, heat intolerance, polydipsia and polyphagia.   Genitourinary:  Negative for difficulty urinating.   Musculoskeletal:  Negative for arthralgias, back pain and joint swelling.   Allergic/Immunologic: Negative for environmental allergies and food allergies.   Neurological: "  Negative for facial asymmetry, speech difficulty, light-headedness, numbness and headaches.   Psychiatric/Behavioral:  Negative for agitation, confusion and dysphoric mood. The patient is not nervous/anxious and is not hyperactive.          /77   Pulse 96   Wt 94.3 kg (208 lb)   SpO2 96%   BMI 29.84 kg/m²     Physical Exam  Constitutional:       Appearance: Normal appearance.   HENT:      Head: Normocephalic and atraumatic.      Nose: No congestion or rhinorrhea.      Mouth/Throat:      Mouth: Mucous membranes are moist.   Eyes:      Pupils: Pupils are equal, round, and reactive to light.   Cardiovascular:      Rate and Rhythm: Normal rate and regular rhythm.      Pulses: Normal pulses.      Heart sounds: Normal heart sounds. No murmur heard.     No friction rub. No gallop.   Pulmonary:      Effort: Pulmonary effort is normal. No respiratory distress.      Breath sounds: Normal breath sounds. No stridor. No wheezing or rales.   Abdominal:      General: Abdomen is flat. Bowel sounds are normal. There is no distension.      Palpations: Abdomen is soft. There is no mass.      Tenderness: There is no guarding.      Hernia: No hernia is present.   Musculoskeletal:         General: No swelling. Normal range of motion.      Cervical back: No rigidity.      Right lower leg: No edema.      Left lower leg: No edema.   Skin:     General: Skin is warm and dry.   Neurological:      General: No focal deficit present.      Mental Status: He is alert and oriented to person, place, and time. Mental status is at baseline.      Motor: No weakness.      Gait: Gait normal.   Psychiatric:         Mood and Affect: Mood normal.         Behavior: Behavior normal.         Thought Content: Thought content normal.         The complexity of medical decision making for this patient's transitional care is moderate.    Assessment/Plan   Problem List Items Addressed This Visit             ICD-10-CM    CKD stage 3 secondary to diabetes  (Multi) - Primary E11.22, N18.30    Relevant Orders    Renal function panel    Follow Up In [facility] - PCP - Medicare Annual     Other Visit Diagnoses         Codes    Needs flu shot     Z23    Relevant Orders    Flu vaccine, trivalent, preservative free, HIGH-DOSE, age 65y+ (Fluzone) (Completed)    Follow Up In [facility] - [facility] - Medicare Annual    Nephrolithiasis     N20.0        Patient presented to the hospital for altered mentation concern for stroke however symptoms of impaired speech and asymmetric facial features likely due to prior history of necrotizing fasciitis of the face.  He was admitted and diagnosed with urosepsis started on IV antibiotics.  Had a stent placed by urology but stone was not removed.  He had acute kidney injury and was hydrated.  Subsequently discharged and completed course of antibiotics.  Overall doing better and more stronger he is due for renal function.  He has an appointment to see urology for evaluation of the stent and possible lithotripsy in January.  Will call with results of testing.  No changes in diabetic care.  No changes in medications at this time.

## 2024-11-19 ENCOUNTER — PATIENT OUTREACH (OUTPATIENT)
Dept: PRIMARY CARE | Facility: CLINIC | Age: 74
End: 2024-11-19
Payer: COMMERCIAL

## 2024-11-19 NOTE — PROGRESS NOTES
Discharge Facility:[facility]  Discharge Diagnosis:Hyperkalemia  Admission Date:11/14/24  Discharge Date: 11/18/24    PCP Appointment Date:None made swnt to pcp office patient declined pcp appointment he's following with specialist  Specialist Appointment Date:   Hospital Encounter and Summary Linked: Yes  See discharge assessment below for further details  Engagement  Call Start Time: 0139 (11/19/2024  1:39 PM)    Medications  Medications reviewed with patient/caregiver?: Yes (flomax 0.4mg isordil  sodium bicarb) (11/19/2024  1:39 PM)  Is the patient having any side effects they believe may be caused by any medication additions or changes?: No (11/19/2024  1:39 PM)  Does the patient have all medications ordered at discharge?: Yes (11/19/2024  1:39 PM)  Is the patient taking all medications as directed (includes completed medication regime)?: Yes (11/19/2024  1:39 PM)    Appointments  Does the patient have a primary care provider?: Yes (11/19/2024  1:39 PM)  Care Management Interventions: Advised patient to make appointment (following with specialist at [facility] at this time.) (11/19/2024  1:39 PM)  Has the patient kept scheduled appointments due by today?: Yes (11/19/2024  1:39 PM)    Self Management  What is the home health agency?: N/A (11/19/2024  1:39 PM)  Has home health visited the patient within 72 hours of discharge?: Not applicable (11/19/2024  1:39 PM)  What Durable Medical Equipment (DME) was ordered?: N/A (11/19/2024  1:39 PM)  Has all Durable Medical Equipment (DME) been delivered?: No (11/19/2024  1:39 PM)    Patient Teaching  Does the patient have access to their discharge instructions?: Yes (11/19/2024  1:39 PM)  Care Management Interventions: Reviewed instructions with patient (11/19/2024  1:39 PM)  What is the patient's perception of their health status since discharge?: Improving (11/19/2024  1:39 PM)  Is the patient/caregiver able to teach back the hierarchy of who to call/visit for  symptoms/problems? PCP, Specialist, Home Health nurse, Urgent Care, ED, 911: Yes (11/19/2024  1:39 PM)    Wrap Up  Wrap Up Additional Comments: discused discharge patient stated that he is improving and making some dietary changes. . provided contact information encouraged call with any questions. (11/19/2024  1:39 PM)

## 2024-11-22 ENCOUNTER — APPOINTMENT (OUTPATIENT)
Dept: PRIMARY CARE | Facility: CLINIC | Age: 74
End: 2024-11-22
Payer: COMMERCIAL

## 2024-11-26 ENCOUNTER — APPOINTMENT (OUTPATIENT)
Dept: PHARMACY | Facility: HOSPITAL | Age: 74
End: 2024-11-26
Payer: COMMERCIAL

## 2024-11-26 DIAGNOSIS — E11.9 TYPE 2 DIABETES MELLITUS WITHOUT COMPLICATION, WITHOUT LONG-TERM CURRENT USE OF INSULIN (MULTI): ICD-10-CM

## 2024-11-26 RX ORDER — HYDRALAZINE HYDROCHLORIDE 25 MG/1
25 TABLET, FILM COATED ORAL EVERY 8 HOURS
COMMUNITY

## 2024-11-26 RX ORDER — ISOSORBIDE DINITRATE 10 MG/1
10 TABLET ORAL
COMMUNITY

## 2024-11-26 RX ORDER — SODIUM BICARBONATE 650 MG/1
650 TABLET ORAL 2 TIMES DAILY
COMMUNITY

## 2024-11-26 NOTE — PROGRESS NOTES
"  Clinical Pharmacy Appointment    Patient ID: Derrek Messina \"Anabelle" is a 74 y.o. male who presents for Diabetes.    Pt is here for Follow Up appointment.     Referring Provider: Jeremías Encinas DO  PCP: Jeremías Encinas DO   Last visit with PCP: 11/14/24   Next visit with PCP: 2/14/25    Subjective   Interval History: Patient had been hospitalized for hyperkalemia and RHODA 11/14-11/18. Reviewed hospital course and medication changes. Updated medication list in chart. Patient will follow up with nephrology on 12/6.    HPI  DIABETES MELLITUS TYPE 2:    Known diabetic complications: CKD.  Does patient follow with Endocrinology: No  Last optometry exam: 2/22/24  Most recent visit in Podiatry: N/A-- patient denies sores or cuts on feet today      Current diabetic medications include:  Mounjaro 15 mg weekly (Monday)  Glimepiride 2 mg twice daily  Jardiance 25 mg daily    Clarifications to above regimen: None  Adverse Effects: None    Past diabetic medications include:  Metformin (discontinued due to kidney function)    Glucose Readings:  Glucometer/CGM Type: Dexcom G7    Current home BG readings: 150-189  Previous home BG readings: 245 on the phone. Has been higher in the past few days due to pain/possible illness. 30-day average glucose of 190 and GMI of 7.9, 14-day average of 189, 7-day average of 196    Any episodes of hypoglycemia? No.  Did patient treat episode of hypoglycemia appropriately? N/A  Does the patient have a prescription for ready-to-use Glucagon? Not on insulin  Does pt have proteinuria? Yes    Lifestyle:  Breakfast: Multigrain bread or multiwheat English muffin or brown rice a 2-3 times a week; oranges  Dinner: pasta, red meat occasionally, chicken, peas, carrots, celery  Drinks: Water, occasional tea, black coffee (stopped)  Physical Activity: Usually does yard work and treadmill. He has been on the treadmill 15-20 minutes at least 3 times a week.     Secondary Prevention:  Statin? Yes  ACE-I/ARB? " Yes  Aspirin? No    Pertinent PMH Review:  PMH of Pancreatitis: No  PMH of Retinopathy: No  PMH of Urinary Tract Infections: No  PMH of MTC: No    Medication reconciliation:  Added: sodium bicarbonate, isosorbide dinitrate, hydralazine  Discontinued: losartan-HCTZ    Drug Interactions  No relevant drug interactions were noted.    Medication System Management  Patient's preferred pharmacy: [facility] and [facility] for  PAP medications  Adherence/Organization: No issues  Affordability/Accessibility: Jardiance, Mounjaro, Vascepa, and Trelegy covered by [facility]    Objective   No Known Allergies  Social History     Social History Narrative    Not on file      Medication Review  Current Outpatient Medications   Medication Instructions    atenolol (TENORMIN) 50 mg, oral, Daily    DULoxetine (CYMBALTA) 60 mg, oral, Daily    ezetimibe (ZETIA) 10 mg, oral, Daily    fenofibrate (TRIGLIDE) 160 mg, oral, Daily    fluticasone-umeclidin-vilanter (Trelegy Ellipta) 200-62.5-25 mcg blister with device 1 puff, inhalation, Daily before breakfast    glimepiride (AMARYL) 2 mg, oral, 2 times daily    hydrALAZINE (APRESOLINE) 25 mg, oral, Every 8 hours    isosorbide dinitrate (ISORDIL) 10 mg, oral, 2 times daily (0900,1400)    Jardiance 25 mg, oral, Daily    ketoconazole (NIZOral) 2 % cream Topical, 2 times daily    losartan-hydrochlorothiazide (Hyzaar) 100-25 mg tablet 1 tablet, oral, Daily    montelukast (SINGULAIR) 10 mg, oral, Nightly    Mounjaro 15 mg, subcutaneous, Every 7 days    pravastatin (PRAVACHOL) 80 mg, oral, Daily    sodium bicarbonate 650 mg, oral, 2 times daily    Vascepa 2 g, oral, 2 times daily      Vitals  BP Readings from Last 2 Encounters:   11/14/24 138/77   09/03/24 110/62     BMI Readings from Last 1 Encounters:   11/14/24 29.84 kg/m²      Labs  A1C  Lab Results   Component Value Date    HGBA1C 6.9 (H) 10/30/2024    HGBA1C 7.8 (H) 08/22/2024    HGBA1C 8.0 (H) 01/08/2024     BMP  Lab  Results   Component Value Date    CALCIUM 10.1 11/14/2024     11/14/2024    K 6.2 (HH) 11/14/2024    CO2 18 (L) 11/14/2024     11/14/2024    BUN 65 (H) 11/14/2024    CREATININE 3.42 (H) 11/14/2024    EGFR 18 (L) 11/14/2024     LFTs  Lab Results   Component Value Date    ALT 21 02/25/2023    AST 20 02/25/2023    ALKPHOS 45 02/25/2023    BILITOT 0.3 02/25/2023     FLP  Lab Results   Component Value Date    TRIG 561 (H) 09/10/2024    CHOL 182 09/10/2024    LDLF - 06/20/2023    LDLCALC  09/10/2024      Comment:      The calculation of LDL and VLDL are inaccurate when the Triglycerides are greater than 400 mg/dL or when the patient is non-fasting. If LDL measurement is necessary contact the testing laboratory for an alternative LDL assay.                                  Near   Borderline      AGE      Desirable  Optimal    High     High     Very High     0-19 Y     0 - 109     ---    110-129   >/= 130     ----    20-24 Y     0 - 119     ---    120-159   >/= 160     ----      >24 Y     0 -  99   100-129  130-159   160-189     >/=190      HDL 33.5 09/10/2024     Urine Microalbumin  Lab Results   Component Value Date    MICROALBCREA 262.4 (H) 04/11/2024     Weight Management  Wt Readings from Last 3 Encounters:   11/14/24 94.3 kg (208 lb)   09/03/24 95.3 kg (210 lb)   08/22/24 94.8 kg (209 lb)      There is no height or weight on file to calculate BMI.     Assessment/Plan   Problem List Items Addressed This Visit       Diabetes mellitus (Multi)     Patient's goal A1c is < 7%.  Is pt at goal? Yes, most recent A1C was 6.9% when patient was in the hospital on 10/30.  Patient's SMBGs are better than they were last visit.  Rationale for plan: A1C has improved and home blood sugar readings are reasonable. Continue to monitor blood sugars for further medication changes. None today.    Medication Changes:  CONTINUE:  Mounjaro 15 mg weekly (Saturday)  Jardiance 25 mg daily  Glimepiride 2 mg twice daily    Future  consideration: decrease glimepiride         Relevant Orders    Referral to Clinical Pharmacy     Clinical Pharmacist follow-up: 12/16/24, Telehealth visit    Continue all meds under the continuation of care with the referring provider and clinical pharmacy team.    Thank you,  Linh Carolina  Clinical Pharmacist  [phone]    Verbal consent to manage patient's drug therapy was obtained from the patient. They were informed they may decline to participate or withdraw from participation in pharmacy services at any time.

## 2024-11-27 ENCOUNTER — PATIENT OUTREACH (OUTPATIENT)
Dept: PRIMARY CARE | Facility: CLINIC | Age: 74
End: 2024-11-27
Payer: COMMERCIAL

## 2024-11-27 NOTE — ASSESSMENT & PLAN NOTE
Patient's goal A1c is < 7%.  Is pt at goal? Yes, most recent A1C was 6.9% when patient was in the hospital on 10/30.  Patient's SMBGs are better than they were last visit.  Rationale for plan: A1C has improved and home blood sugar readings are reasonable. Continue to monitor blood sugars for further medication changes. None today.    Medication Changes:  CONTINUE:  Mounjaro 15 mg weekly (Saturday)  Jardiance 25 mg daily  Glimepiride 2 mg twice daily    Future consideration: decrease glimepiride

## 2024-12-16 ENCOUNTER — APPOINTMENT (OUTPATIENT)
Dept: PHARMACY | Facility: HOSPITAL | Age: 74
End: 2024-12-16
Payer: COMMERCIAL

## 2024-12-16 DIAGNOSIS — E11.9 TYPE 2 DIABETES MELLITUS WITHOUT COMPLICATION, WITHOUT LONG-TERM CURRENT USE OF INSULIN (MULTI): ICD-10-CM

## 2024-12-16 NOTE — PROGRESS NOTES
"  Clinical Pharmacy Appointment    Patient ID: Derrek Messina \"Anabelle" is a 74 y.o. male who presents for Diabetes.    Pt is here for Follow Up appointment.     Referring Provider: Jeremías Encinas DO  PCP: Jeremías Encinas DO   Last visit with PCP: 11/14/24   Next visit with PCP: 2/14/25    Subjective   Interval History: Patient had been hospitalized for hyperkalemia and RHODA 11/14-11/18. Patient had visit with nephrology on 12/6. Follow-up visit scheduled for March with another set of labs. He has had an issue with his left big toe and his podiatrist recommended he see vascular medicine since he thinks it's related to circulation rather than arthritis.    HPI  DIABETES MELLITUS TYPE 2:    Known diabetic complications: CKD.  Does patient follow with Endocrinology: No  Last optometry exam: 2/22/24  Most recent visit in Podiatry: N/A-- patient denies sores or cuts on feet today      Current diabetic medications include:  Mounjaro 15 mg weekly (Monday)  Glimepiride 2 mg twice daily  Jardiance 25 mg daily    Clarifications to above regimen: None  Adverse Effects: None    Past diabetic medications include:  Metformin (discontinued due to kidney function)    Glucose Readings:  Glucometer/CGM Type: Dexcom G7    Current home BG readings: 14-day average glucose 132, GMI 6.5%, TIR 14% high, 80% in range.   Previous home BG readings: 150-189    Any episodes of hypoglycemia? Yes, a couple times since last month.  Did patient treat episode of hypoglycemia appropriately? Yes, teaspoon of sugar  Does the patient have a prescription for ready-to-use Glucagon? Not on insulin  Does pt have proteinuria? Yes    Lifestyle:  Breakfast: Multigrain bread or a bagel with butter. Once in a while eggs or pancakes.  Lunch: white or brown rice, seafood  Dinner: some pasta, red meat occasionally, mostly chicken, peas, carrots, celery  Drinks: Water, tea, black coffee (decreased)  Physical Activity: Usually does yard work and treadmill. He has been on " the treadmill 15-20 minutes at least 3 times a week.     Secondary Prevention:  Statin? Yes  ACE-I/ARB? Yes  Aspirin? No    Pertinent PMH Review:  PMH of Pancreatitis: No  PMH of Retinopathy: No  PMH of Urinary Tract Infections: No  PMH of MTC: No    Drug Interactions  No relevant drug interactions were noted.    Medication System Management  Patient's preferred pharmacy: [facility] and [facility] for  PAP medications  Adherence/Organization: No issues  Affordability/Accessibility: Jardiance, Mounjaro, Vascepa, and Trelegy covered by [facility]    Objective   No Known Allergies  Social History     Social History Narrative    Not on file      Medication Review  Current Outpatient Medications   Medication Instructions    atenolol (TENORMIN) 50 mg, oral, Daily    DULoxetine (CYMBALTA) 60 mg, oral, Daily    ezetimibe (ZETIA) 10 mg, oral, Daily    fenofibrate (TRIGLIDE) 160 mg, oral, Daily    fluticasone-umeclidin-vilanter (Trelegy Ellipta) 200-62.5-25 mcg blister with device 1 puff, inhalation, Daily before breakfast    glimepiride (AMARYL) 2 mg, oral, 2 times daily    hydrALAZINE (APRESOLINE) 25 mg, oral, Every 8 hours    isosorbide dinitrate (ISORDIL) 10 mg, oral, 2 times daily (0900,1400)    Jardiance 25 mg, oral, Daily    ketoconazole (NIZOral) 2 % cream Topical, 2 times daily    losartan-hydrochlorothiazide (Hyzaar) 100-25 mg tablet 1 tablet, oral, Daily    montelukast (SINGULAIR) 10 mg, oral, Nightly    Mounjaro 15 mg, subcutaneous, Every 7 days    pravastatin (PRAVACHOL) 80 mg, oral, Daily    sodium bicarbonate 650 mg, oral, 2 times daily    Vascepa 2 g, oral, 2 times daily      Vitals  BP Readings from Last 2 Encounters:   11/14/24 138/77   09/03/24 110/62     BMI Readings from Last 1 Encounters:   11/14/24 29.84 kg/m²      Labs  A1C  Lab Results   Component Value Date    HGBA1C 6.9 (H) 10/30/2024    HGBA1C 7.8 (H) 08/22/2024    HGBA1C 8.0 (H) 01/08/2024     BMP  Lab Results   Component  Value Date    CALCIUM 10.1 11/14/2024     11/14/2024    K 6.2 (HH) 11/14/2024    CO2 18 (L) 11/14/2024     11/14/2024    BUN 65 (H) 11/14/2024    CREATININE 3.42 (H) 11/14/2024    EGFR 18 (L) 11/14/2024     LFTs  Lab Results   Component Value Date    ALT 21 02/25/2023    AST 20 02/25/2023    ALKPHOS 45 02/25/2023    BILITOT 0.3 02/25/2023     FLP  Lab Results   Component Value Date    TRIG 561 (H) 09/10/2024    CHOL 182 09/10/2024    LDLF - 06/20/2023    LDLCALC  09/10/2024      Comment:      The calculation of LDL and VLDL are inaccurate when the Triglycerides are greater than 400 mg/dL or when the patient is non-fasting. If LDL measurement is necessary contact the testing laboratory for an alternative LDL assay.                                  Near   Borderline      AGE      Desirable  Optimal    High     High     Very High     0-19 Y     0 - 109     ---    110-129   >/= 130     ----    20-24 Y     0 - 119     ---    120-159   >/= 160     ----      >24 Y     0 -  99   100-129  130-159   160-189     >/=190      HDL 33.5 09/10/2024     Urine Microalbumin  Lab Results   Component Value Date    MICROALBCREA 262.4 (H) 04/11/2024     Weight Management  Wt Readings from Last 3 Encounters:   11/14/24 94.3 kg (208 lb)   09/03/24 95.3 kg (210 lb)   08/22/24 94.8 kg (209 lb)      There is no height or weight on file to calculate BMI.     Assessment/Plan   Problem List Items Addressed This Visit       Diabetes mellitus (Multi)     Patient's goal A1c is < 7%.  Is pt at goal? Yes, most recent A1C was 6.9% when patient was in the hospital on 10/30.  Patient's blood sugar continues to be well controlled. Since he has had a couple hypoglycemic events in the afternoon since last visit, will decrease glimepiride to once daily.    Medication Changes:  CONTINUE:  Mounjaro 15 mg weekly (Saturday)  Jardiance 25 mg daily  DECREASE:  Glimepiride from 2 mg twice daily to once daily (evening before dinner)    Future  consideration: discontinue glimepiride         Relevant Orders    Referral to Clinical Pharmacy     Clinical Pharmacist follow-up: 1/6/25, Telehealth visit    Continue all meds under the continuation of care with the referring provider and clinical pharmacy team.    Thank you,  Linh Carolina  Clinical Pharmacist  [phone]    Verbal consent to manage patient's drug therapy was obtained from the patient. They were informed they may decline to participate or withdraw from participation in pharmacy services at any time.

## 2024-12-16 NOTE — ASSESSMENT & PLAN NOTE
Patient's goal A1c is < 7%.  Is pt at goal? Yes, most recent A1C was 6.9% when patient was in the hospital on 10/30.  Patient's blood sugar continues to be well controlled. Since he has had a couple hypoglycemic events in the afternoon since last visit, will decrease glimepiride to once daily.    Medication Changes:  CONTINUE:  Mounjaro 15 mg weekly (Saturday)  Jardiance 25 mg daily  DECREASE:  Glimepiride from 2 mg twice daily to once daily (evening before dinner)    Future consideration: discontinue glimepiride

## 2025-01-01 PROCEDURE — RXMED WILLOW AMBULATORY MEDICATION CHARGE

## 2025-01-03 ENCOUNTER — PHARMACY VISIT (OUTPATIENT)
Dept: PHARMACY | Facility: CLINIC | Age: 75
End: 2025-01-03
Payer: COMMERCIAL

## 2025-01-03 PROCEDURE — RXMED WILLOW AMBULATORY MEDICATION CHARGE

## 2025-01-06 ENCOUNTER — APPOINTMENT (OUTPATIENT)
Dept: PHARMACY | Facility: HOSPITAL | Age: 75
End: 2025-01-06
Payer: COMMERCIAL

## 2025-01-06 DIAGNOSIS — E11.9 TYPE 2 DIABETES MELLITUS WITHOUT COMPLICATION, WITHOUT LONG-TERM CURRENT USE OF INSULIN (MULTI): ICD-10-CM

## 2025-01-06 NOTE — ASSESSMENT & PLAN NOTE
Patient's goal A1c is < 7%.  Is pt at goal? Yes, most recent A1C was 6.9% when patient was in the hospital on 10/30.  Aside from the gap in data for 1-2 weeks due to lack of Dexcom sensors, patient's blood sugar continues to be well controlled. Patient denies any hypoglycemic events since decreasing glimepiride last month. Continue current medications.    Medication Changes:  CONTINUE:  Mounjaro 15 mg weekly (Saturday)  Jardiance 25 mg daily (may still have some renal benefit if not glycemic benefit at current renal function)  Glimepiride 2 mg once daily (evening before dinner)    Future consideration: discontinue glimepiride

## 2025-01-06 NOTE — PROGRESS NOTES
"  Clinical Pharmacy Appointment    Patient ID: Derrek Messina \"Anabelle" is a 74 y.o. male who presents for Diabetes.    Pt is here for Follow Up appointment.     Referring Provider: Jeremías Encinas DO  PCP: Jeremías Encinas DO   Last visit with PCP: 11/14/24   Next visit with PCP: 2/14/25    Subjective   Interval History: Patient had been hospitalized for hyperkalemia and RHODA 11/14-11/18. Patient had visit with nephrology on 12/6. Follow-up visit scheduled for March with another set of labs. He has had an issue with his left big toe and his podiatrist recommended he see vascular medicine since he thinks it's related to circulation rather than arthritis. Also having some pain in the hip.    HPI  DIABETES MELLITUS TYPE 2:    Known diabetic complications: CKD.  Does patient follow with Endocrinology: No  Last optometry exam: 2/22/24  Most recent visit in Podiatry: December 2024 -- patient denies sores or cuts on feet today      Current diabetic medications include:  Mounjaro 15 mg weekly (Monday)  Glimepiride 2 mg once daily with dinner  Jardiance 25 mg daily    Clarifications to above regimen: None  Adverse Effects: None    Past diabetic medications include:  Metformin (discontinued due to kidney function)    Glucose Readings:  Glucometer/CGM Type: Dexcom G7    Current home BG readings: 167 right now. Blood sugar was still good before Christmas, but ran out of Dexcom sensors until the new year. 3-day average of 147.  Previous home BG readings: 14-day average glucose 132, GMI 6.5%, TIR 14% high, 80% in range.     Any episodes of hypoglycemia? None last month.  Did patient treat episode of hypoglycemia appropriately? Yes, teaspoon of sugar  Does the patient have a prescription for ready-to-use Glucagon? Not on insulin  Does pt have proteinuria? Yes    Lifestyle:  Breakfast: Multigrain bread or a bagel with butter. Once in a while eggs or pancakes.  Lunch: white or brown rice, seafood  Dinner: some pasta, red meat " occasionally, mostly chicken, peas, carrots, celery  Drinks: Water, tea, black coffee (decreased)  Physical Activity: Usually does yard work and treadmill. He has been on the treadmill 15-20 minutes at least 3 times a week.     Secondary Prevention:  Statin? Yes  ACE-I/ARB? Yes  Aspirin? No    Pertinent PMH Review:  PMH of Pancreatitis: No  PMH of Retinopathy: No  PMH of Urinary Tract Infections: No  PMH of MTC: No    Drug Interactions  No relevant drug interactions were noted.    Medication System Management  Patient's preferred pharmacy: [facility] and [facility] for [facility] medications  Adherence/Organization: No issues  Affordability/Accessibility: Jardiance, Mounjaro, Vascepa, and Trelegy covered by [facility]    Objective   No Known Allergies  Social History     Social History Narrative    Not on file      Medication Review  Current Outpatient Medications   Medication Instructions    atenolol (TENORMIN) 50 mg, oral, Daily    DULoxetine (CYMBALTA) 60 mg, oral, Daily    ezetimibe (ZETIA) 10 mg, oral, Daily    fenofibrate (TRIGLIDE) 160 mg, oral, Daily    fluticasone-umeclidin-vilanter (Trelegy Ellipta) 200-62.5-25 mcg blister with device 1 puff, inhalation, Daily before breakfast    glimepiride (AMARYL) 2 mg, oral, 2 times daily    hydrALAZINE (APRESOLINE) 25 mg, oral, Every 8 hours    isosorbide dinitrate (ISORDIL) 10 mg, oral, 2 times daily (0900,1400)    Jardiance 25 mg, oral, Daily    ketoconazole (NIZOral) 2 % cream Topical, 2 times daily    losartan-hydrochlorothiazide (Hyzaar) 100-25 mg tablet 1 tablet, oral, Daily    montelukast (SINGULAIR) 10 mg, oral, Nightly    Mounjaro 15 mg, subcutaneous, Every 7 days    pravastatin (PRAVACHOL) 80 mg, oral, Daily    sodium bicarbonate 650 mg, oral, 2 times daily    Vascepa 2 g, oral, 2 times daily      Vitals  BP Readings from Last 2 Encounters:   11/14/24 138/77   09/03/24 110/62     BMI Readings from Last 1 Encounters:   11/14/24 29.84 kg/m²       Labs  A1C  Lab Results   Component Value Date    HGBA1C 6.9 (H) 10/30/2024    HGBA1C 7.8 (H) 08/22/2024    HGBA1C 8.0 (H) 01/08/2024     BMP  Lab Results   Component Value Date    CALCIUM 10.1 11/14/2024     11/14/2024    K 6.2 (HH) 11/14/2024    CO2 18 (L) 11/14/2024     11/14/2024    BUN 65 (H) 11/14/2024    CREATININE 3.42 (H) 11/14/2024    EGFR 18 (L) 11/14/2024     LFTs  Lab Results   Component Value Date    ALT 21 02/25/2023    AST 20 02/25/2023    ALKPHOS 45 02/25/2023    BILITOT 0.3 02/25/2023     FLP  Lab Results   Component Value Date    TRIG 561 (H) 09/10/2024    CHOL 182 09/10/2024    LDLF - 06/20/2023    LDLCALC  09/10/2024      Comment:      The calculation of LDL and VLDL are inaccurate when the Triglycerides are greater than 400 mg/dL or when the patient is non-fasting. If LDL measurement is necessary contact the testing laboratory for an alternative LDL assay.                                  Near   Borderline      AGE      Desirable  Optimal    High     High     Very High     0-19 Y     0 - 109     ---    110-129   >/= 130     ----    20-24 Y     0 - 119     ---    120-159   >/= 160     ----      >24 Y     0 -  99   100-129  130-159   160-189     >/=190      HDL 33.5 09/10/2024     Urine Microalbumin  Lab Results   Component Value Date    MICROALBCREA 262.4 (H) 04/11/2024     Weight Management  Wt Readings from Last 3 Encounters:   11/14/24 94.3 kg (208 lb)   09/03/24 95.3 kg (210 lb)   08/22/24 94.8 kg (209 lb)      There is no height or weight on file to calculate BMI.     Assessment/Plan   Problem List Items Addressed This Visit       Diabetes mellitus (Multi)     Patient's goal A1c is < 7%.  Is pt at goal? Yes, most recent A1C was 6.9% when patient was in the hospital on 10/30.  Aside from the gap in data for 1-2 weeks due to lack of Dexcom sensors, patient's blood sugar continues to be well controlled. Patient denies any hypoglycemic events since decreasing glimepiride last  month. Continue current medications.    Medication Changes:  CONTINUE:  Mounjaro 15 mg weekly (Saturday)  Jardiance 25 mg daily (may still have some renal benefit if not glycemic benefit at current renal function)  Glimepiride 2 mg once daily (evening before dinner)    Future consideration: discontinue glimepiride         Relevant Orders    Referral to Clinical Pharmacy       Clinical Pharmacist follow-up: 2/18/25, Telehealth visit    Continue all meds under the continuation of care with the referring provider and clinical pharmacy team.    Thank you,  Linh Carolina, PharmD  Clinical Pharmacist  [phone]    Verbal consent to manage patient's drug therapy was obtained from the patient. They were informed they may decline to participate or withdraw from participation in pharmacy services at any time.

## 2025-01-14 DIAGNOSIS — E78.49 FAMILIAL COMBINED HYPERLIPIDEMIA: ICD-10-CM

## 2025-01-14 RX ORDER — FENOFIBRATE 160 MG/1
160 TABLET ORAL DAILY
Qty: 100 TABLET | Refills: 2 | Status: SHIPPED | OUTPATIENT
Start: 2025-01-14

## 2025-01-14 NOTE — TELEPHONE ENCOUNTER
Received VMM from patient requesting refill. States [facility] has been trying to reach our office. TCT patient and explained that we have not received a refill request from his pharmacy but happy to refill the medication today. LOV 9/2024 note reviewed. Script cued and forwarded for signature

## 2025-02-03 PROCEDURE — RXMED WILLOW AMBULATORY MEDICATION CHARGE

## 2025-02-05 ENCOUNTER — PHARMACY VISIT (OUTPATIENT)
Dept: PHARMACY | Facility: CLINIC | Age: 75
End: 2025-02-05
Payer: COMMERCIAL

## 2025-02-12 PROCEDURE — RXMED WILLOW AMBULATORY MEDICATION CHARGE

## 2025-02-13 DIAGNOSIS — E11.9 TYPE 2 DIABETES MELLITUS WITHOUT COMPLICATION, WITHOUT LONG-TERM CURRENT USE OF INSULIN (MULTI): ICD-10-CM

## 2025-02-14 ENCOUNTER — PHARMACY VISIT (OUTPATIENT)
Dept: PHARMACY | Facility: CLINIC | Age: 75
End: 2025-02-14
Payer: COMMERCIAL

## 2025-02-14 ENCOUNTER — APPOINTMENT (OUTPATIENT)
Dept: PRIMARY CARE | Facility: CLINIC | Age: 75
End: 2025-02-14
Payer: COMMERCIAL

## 2025-02-14 ENCOUNTER — HOSPITAL ENCOUNTER (OUTPATIENT)
Dept: RADIOLOGY | Facility: CLINIC | Age: 75
Discharge: HOME | End: 2025-02-14
Payer: COMMERCIAL

## 2025-02-14 VITALS
DIASTOLIC BLOOD PRESSURE: 67 MMHG | SYSTOLIC BLOOD PRESSURE: 128 MMHG | BODY MASS INDEX: 30.49 KG/M2 | HEART RATE: 67 BPM | HEIGHT: 70 IN | OXYGEN SATURATION: 97 % | WEIGHT: 213 LBS

## 2025-02-14 DIAGNOSIS — I10 HYPERTENSION, UNSPECIFIED TYPE: ICD-10-CM

## 2025-02-14 DIAGNOSIS — M79.675 PAIN OF TOE OF LEFT FOOT: ICD-10-CM

## 2025-02-14 DIAGNOSIS — I73.9 CLAUDICATION (CMS-HCC): ICD-10-CM

## 2025-02-14 DIAGNOSIS — I10 BENIGN ESSENTIAL HYPERTENSION: ICD-10-CM

## 2025-02-14 DIAGNOSIS — J44.9 COPD, MILD (MULTI): ICD-10-CM

## 2025-02-14 DIAGNOSIS — R93.1 AGATSTON CORONARY ARTERY CALCIUM SCORE GREATER THAN 400: ICD-10-CM

## 2025-02-14 DIAGNOSIS — Z00.00 ROUTINE GENERAL MEDICAL EXAMINATION AT HEALTH CARE FACILITY: ICD-10-CM

## 2025-02-14 DIAGNOSIS — Z23 NEEDS FLU SHOT: Primary | ICD-10-CM

## 2025-02-14 DIAGNOSIS — E11.22 CKD STAGE 3 SECONDARY TO DIABETES (MULTI): ICD-10-CM

## 2025-02-14 DIAGNOSIS — N18.30 CKD STAGE 3 SECONDARY TO DIABETES (MULTI): ICD-10-CM

## 2025-02-14 DIAGNOSIS — Z00.00 HEALTH CARE MAINTENANCE: ICD-10-CM

## 2025-02-14 DIAGNOSIS — E66.01 OBESITY, MORBID (MULTI): ICD-10-CM

## 2025-02-14 DIAGNOSIS — N18.32 CHRONIC KIDNEY DISEASE, STAGE 3B (MULTI): ICD-10-CM

## 2025-02-14 PROBLEM — R05.9 COUGH: Status: RESOLVED | Noted: 2023-08-26 | Resolved: 2025-02-14

## 2025-02-14 PROBLEM — J42 CHRONIC BRONCHITIS, UNSPECIFIED CHRONIC BRONCHITIS TYPE (MULTI): Status: RESOLVED | Noted: 2023-06-06 | Resolved: 2025-02-14

## 2025-02-14 PROBLEM — B35.6 TINEA CRURIS: Status: RESOLVED | Noted: 2024-08-22 | Resolved: 2025-02-14

## 2025-02-14 PROCEDURE — 73660 X-RAY EXAM OF TOE(S): CPT | Mod: LT

## 2025-02-14 RX ORDER — ALBUTEROL SULFATE 90 UG/1
2 INHALANT RESPIRATORY (INHALATION) EVERY 4 HOURS PRN
Qty: 8.5 G | Refills: 0 | Status: SHIPPED | OUTPATIENT
Start: 2025-02-14 | End: 2026-02-14

## 2025-02-14 RX ORDER — EZETIMIBE 10 MG/1
10 TABLET ORAL DAILY
Qty: 100 TABLET | Refills: 0 | Status: SHIPPED | OUTPATIENT
Start: 2025-02-14

## 2025-02-14 ASSESSMENT — ACTIVITIES OF DAILY LIVING (ADL)
MANAGING_FINANCES: INDEPENDENT
GROCERY_SHOPPING: INDEPENDENT
TAKING_MEDICATION: INDEPENDENT
DOING_HOUSEWORK: INDEPENDENT
DRESSING: INDEPENDENT
BATHING: INDEPENDENT

## 2025-02-14 ASSESSMENT — PATIENT HEALTH QUESTIONNAIRE - PHQ9
2. FEELING DOWN, DEPRESSED OR HOPELESS: NOT AT ALL
1. LITTLE INTEREST OR PLEASURE IN DOING THINGS: NOT AT ALL
SUM OF ALL RESPONSES TO PHQ9 QUESTIONS 1 AND 2: 0

## 2025-02-14 NOTE — ASSESSMENT & PLAN NOTE
Orders:    Follow Up In [facility] - PCP - Medicare Annual    Hemoglobin A1c; Future    Albumin-Creatinine Ratio, Urine Random; Future  Check renal function and urine albumin.  Avoid ibuprofen.  Continue following with nephrology.  Continue ARB and SGLT2

## 2025-02-14 NOTE — PROGRESS NOTES
"Subjective   Patient ID: Derrek Messina \"Javier\" is a 74 y.o. male who presents for Medicare Annual Wellness Visit Subsequent.    Patient Care Team:  Jeremías Encinas DO as PCP - [employer] PCP       HPI     Patient is a 74-year-old male with past medical history of obstructive sleep apnea diabetes mellitus type 2 hypertension dyslipidemia and asymptomatic coronary artery disease chronic kidney disease stage III who presents for Medicare wellness     Patient with obstructive sleep apnea following with ENT.  Compliant with treatment     Diabetes mellitus type 2.  Following with pharmacy.  On continuous glucose monitor.  Based on average readings his A1c is less than 6.5.  His sulfonylurea was stopped.  He watches what he eats.. He is compliant with medications and takes his Mounjaro. He denies increased thirst or increased urination.   No numbness or tingling of the extremities.  Now off metformin due to kidney function.  Last A1c elevated.  He is following with pharmacist for medication titration.       Hypertension.  Currently well controlled on current medications denies headache changes in vision orthopnea.  Reports compliance with his medications.     Dyslipidemia with elevated triglycerides. Due for LDL recheck and reevaluation of the triglyceride levels.     Asym CAD. Following with cards.      COPD. Not smoking. Occasional SOB on exertion but overall stable. No recent exacerbation He denies any wheezing. Most recent PFTs showing mild to moderate obstruction patient is currently taking Breo and albuterol.      CKD 3 with proteinuria. Pt on lisinopril 40mg and SGLT2     B/L renal cysts with hx of nephrolithiasis. Following with nephro.  Recent ultrasound of the kidneys shows stability of the cysts    Denies illicit substances smoking or alcohol.    Review of Systems  Constitutional: No fever or chills, No Night Sweats  Eyes: No Blurry Vision or Eye sight problems  ENT: No Nasal Discharge, " "Hoarseness, sore throat  Cardiovascular: no chest pain, no palpitations and no syncope.   Respiratory: no cough, no shortness of breath during exertion and no shortness of breath at rest.   Gastrointestinal: no abdominal pain, no nausea and no vomiting.   : No issues with urinary stream, burning with urination, no blood in urine or stools  Skin: No Skin rashes or Lesions  Neuro: No Headache, no dizziness or Numbness or tingling  Psych: No Anxiety, depression or sleeping problems  Heme: No Easy bleeding or brusing.     Objective   /67   Pulse 67   Ht 1.778 m (5' 10\")   Wt 96.6 kg (213 lb)   SpO2 97%   BMI 30.56 kg/m²     Physical Exam  Constitutional: Alert and in no acute distress. Well developed, well nourished.   Head and Face: Head and face: Normal.    Eyes: Normal external exam. Pupils were equal in size, round, reactive to light (PERRL) with normal accommodation and extraocular movements intact (EOMI).   Ears, Nose, Mouth, and Throat: External inspection of ears and nose: Normal.  Hearing: Normal.  Nasal mucosa, septum, and turbinates: Normal.  Lips, teeth, and gums: Normal.  Oropharynx: Normal.   Neck: No neck mass was observed. Supple. Thyroid not enlarged and there were no palpable thyroid nodules.   Cardiovascular: Heart rate and rhythm were normal, normal S1 and S2. Pedal pulses: Normal. No peripheral edema.   Pulmonary: No respiratory distress. Clear bilateral breath sounds.   Abdomen: Soft nontender; no abdominal mass palpated. Normal bowel sounds. No organomegaly.   : Deferred  Musculoskeletal: No joint swelling seen, normal movements of all extremities. Range of motion: Normal.  Muscle strength/tone: Normal.    Skin: Normal skin color and pigmentation, normal skin turgor, and no rash.   Neurologic: Deep tendon reflexes were 2+ and symmetric.   Psychiatric: Judgment and insight: Intact. Mood and affect: Normal.  Lymphatic: No cervical lymphadenopathy. Palpation of lymph nodes in axillae: " Normal.  Palpation of lymph nodes in groin: Normal.    Lab Results   Component Value Date    WBC 7.6 06/20/2023    HGB 15.2 06/20/2023    HCT 48.2 06/20/2023     06/20/2023    CHOL 182 09/10/2024    TRIG 561 (H) 09/10/2024    HDL 33.5 09/10/2024    LDLDIRECT 96 09/10/2024    ALT 21 02/25/2023    AST 20 02/25/2023     11/14/2024    K 6.2 (HH) 11/14/2024     11/14/2024    CREATININE 3.42 (H) 11/14/2024    BUN 65 (H) 11/14/2024    CO2 18 (L) 11/14/2024    TSH 1.72 06/10/2021    PSA 0.7 02/25/2023    HGBA1C 6.9 (H) 10/30/2024    ALBUR 259 (H) 02/25/2023       Complete Pulmonary Function Test Pre/Post Bronchodialator (Spirometry Pre/Post/DLCO/Lung Volumes)  Pulmonary function test    Findings:    Reduced FEV1 to FVC ratio noted.  FEV1 is 2.25 L which is 73% of predicted   normal.    The shape of the flow-volume loop suggest obstruction.    Total lung capacity is 5.92 L which is 83% of predicted normal.  RV/TLC   ratio is elevated at 44%.    Normal DLCO noted.  DLCO is 23.38 ml/min/mmHg which is 92% of predicted    Impression    Moderate obstructive ventilatory defect with preserved diffusion capacity.    James Tipton MD  Pulmonary/[facility]      Assessment/Plan   Problem List Items Addressed This Visit             ICD-10-CM    CKD stage 3 secondary to diabetes (Multi) E11.22, N18.30       Orders:    Follow Up In [facility] - PCP - Medicare Annual    Hemoglobin A1c; Future    Albumin-Creatinine Ratio, Urine Random; Future           Relevant Orders    Hemoglobin A1c    Albumin-Creatinine Ratio, Urine Random    Vascular US ankle brachial index (ANJELICA) with exercise    COPD, mild (Multi) J44.9    Relevant Medications    albuterol (ProAir HFA) 90 mcg/actuation inhaler     Other Visit Diagnoses         Codes    Needs flu shot    -  Primary Z23    Health care maintenance     Z00.00    Relevant Orders    Hemoglobin A1c    Hepatitis C antibody    Renal function panel    CBC  and Auto Differential    Albumin-Creatinine Ratio, Urine Random    Prostate Spec.Ag,Screen    Lipid Panel    Hypertension, unspecified type     I10    Relevant Orders    CBC and Auto Differential    Routine general medical examination at health care facility     Z00.00    Relevant Orders    1 Year Follow Up In [facility] - PCP - Wellness Exam    Claudication (CMS-HCC)     I73.9    Relevant Orders    Vascular US ankle brachial index (ANJELICA) with exercise    Pain of toe of left foot     M79.675    Relevant Orders    XR toe left 2+ views          No change in cognition.  Able to perform his activities of daily living without difficulty.    Dear Derrek Messina     It was my pleasure to take care of you today in the office. Below are the things we discussed today:    1. Immunizations: Yearly Flu shot is recommended.          a: COVID: Up-to-date         b: Tetanus: Ordered today         c: Shingrix: Up-to-date         d: Pneumovax: Up-to-date         e: Prevnar: Up-to-date    2. Blood Work: Ordered  3. Seen your dentist twice a year  4. Yearly Eye exam is recommended    5. BMI: 30.6  6: Diet recommendations:   Eat Clean, Try to have as many home cooked meals as possible  Avoid processed foods which contain excess calories, sugar, and sodium.    7. Exercise recommendations:   150 minutes a week to maintain your weight     If you have to loose weight, you need a better diet and exercise plan.     8. Please get your Living will / Advance directive completed if you do not have one already. Please make sure our office has a copy of the latest one.     9. Colonoscopy: Uptodate  10. PSA: Ordered    11.  Follow-up 3 months    Follow up in one year for a Physical. Please call the office before your Physical to see if you need blood work completed prior to your physical.     Please call me if any questions arise from now until your next visit. I will call you after I am done seeing patients. A Doctor is always  available by phone when the office is closed. Please feel free to call for help with any problem that you feel shouldn't wait until the office re-opens.     Jeremías Encinas, DO   Review of Systems  Physical ExamPatient was identified as a fall risk. Risk prevention instructions provided.

## 2025-02-14 NOTE — ASSESSMENT & PLAN NOTE
Stable controlled on current medications.  No exacerbations.  No hospitalizations.  Follow-up 3 months

## 2025-02-14 NOTE — PATIENT INSTRUCTIONS

## 2025-02-14 NOTE — ASSESSMENT & PLAN NOTE
Continue with blood pressure control and statin as well as fenofibrate and Zetia.  Check LDL.  Advised Mediterranean diet

## 2025-02-15 LAB
ALBUMIN SERPL-MCNC: 4.7 G/DL (ref 3.6–5.1)
ALBUMIN/CREAT UR: 265 MG/G CREAT
BASOPHILS # BLD AUTO: 30 CELLS/UL (ref 0–200)
BASOPHILS NFR BLD AUTO: 0.5 %
BUN SERPL-MCNC: 40 MG/DL (ref 7–25)
BUN/CREAT SERPL: 17 (CALC) (ref 6–22)
CALCIUM SERPL-MCNC: 9.6 MG/DL (ref 8.6–10.3)
CHLORIDE SERPL-SCNC: 101 MMOL/L (ref 98–110)
CHOLEST SERPL-MCNC: 146 MG/DL
CHOLEST/HDLC SERPL: 4.1 (CALC)
CO2 SERPL-SCNC: 24 MMOL/L (ref 20–32)
CREAT SERPL-MCNC: 2.36 MG/DL (ref 0.7–1.28)
CREAT UR-MCNC: 80 MG/DL (ref 20–320)
EGFRCR SERPLBLD CKD-EPI 2021: 28 ML/MIN/1.73M2
EOSINOPHIL # BLD AUTO: 138 CELLS/UL (ref 15–500)
EOSINOPHIL NFR BLD AUTO: 2.3 %
ERYTHROCYTE [DISTWIDTH] IN BLOOD BY AUTOMATED COUNT: 15.3 % (ref 11–15)
EST. AVERAGE GLUCOSE BLD GHB EST-MCNC: 128 MG/DL
EST. AVERAGE GLUCOSE BLD GHB EST-SCNC: 7.1 MMOL/L
GLUCOSE SERPL-MCNC: 77 MG/DL (ref 65–99)
HBA1C MFR BLD: 6.1 % OF TOTAL HGB
HCT VFR BLD AUTO: 49.7 % (ref 38.5–50)
HCV AB SERPL QL IA: NORMAL
HDLC SERPL-MCNC: 36 MG/DL
HGB BLD-MCNC: 16 G/DL (ref 13.2–17.1)
LDLC SERPL CALC-MCNC: 75 MG/DL (CALC)
LYMPHOCYTES # BLD AUTO: 1458 CELLS/UL (ref 850–3900)
LYMPHOCYTES NFR BLD AUTO: 24.3 %
MCH RBC QN AUTO: 28.1 PG (ref 27–33)
MCHC RBC AUTO-ENTMCNC: 32.2 G/DL (ref 32–36)
MCV RBC AUTO: 87.2 FL (ref 80–100)
MICROALBUMIN UR-MCNC: 21.2 MG/DL
MONOCYTES # BLD AUTO: 480 CELLS/UL (ref 200–950)
MONOCYTES NFR BLD AUTO: 8 %
NEUTROPHILS # BLD AUTO: 3894 CELLS/UL (ref 1500–7800)
NEUTROPHILS NFR BLD AUTO: 64.9 %
NONHDLC SERPL-MCNC: 110 MG/DL (CALC)
PHOSPHATE SERPL-MCNC: 4.2 MG/DL (ref 2.1–4.3)
PLATELET # BLD AUTO: 219 THOUSAND/UL (ref 140–400)
PMV BLD REES-ECKER: 9.7 FL (ref 7.5–12.5)
POTASSIUM SERPL-SCNC: 4.9 MMOL/L (ref 3.5–5.3)
PSA SERPL-MCNC: 0.77 NG/ML
RBC # BLD AUTO: 5.7 MILLION/UL (ref 4.2–5.8)
SODIUM SERPL-SCNC: 137 MMOL/L (ref 135–146)
TRIGL SERPL-MCNC: 264 MG/DL
WBC # BLD AUTO: 6 THOUSAND/UL (ref 3.8–10.8)

## 2025-02-18 ENCOUNTER — APPOINTMENT (OUTPATIENT)
Dept: PHARMACY | Facility: HOSPITAL | Age: 75
End: 2025-02-18
Payer: COMMERCIAL

## 2025-02-18 DIAGNOSIS — E11.9 TYPE 2 DIABETES MELLITUS WITHOUT COMPLICATION, WITHOUT LONG-TERM CURRENT USE OF INSULIN (MULTI): ICD-10-CM

## 2025-02-18 RX ORDER — ASPIRIN 81 MG/1
81 TABLET ORAL DAILY
COMMUNITY

## 2025-02-18 NOTE — PROGRESS NOTES
"  Clinical Pharmacy Appointment    Patient ID: Derrek Messina \"Anabelle" is a 74 y.o. male who presents for Diabetes.    Pt is here for Follow Up appointment.     Referring Provider: Jeremías Encinas DO  PCP: Jeremías Encinas DO   Last visit with PCP: 2/14/24   Next visit with PCP: 5/14/25    Subjective   HPI  DIABETES MELLITUS TYPE 2:    Known diabetic complications: CKD.  Does patient follow with Endocrinology: No  Last optometry exam: 2/22/24  Most recent visit in Podiatry: December 2024 -- patient denies sores or cuts on feet today      Current diabetic medications include:  Mounjaro 15 mg weekly (Monday)  Jardiance 25 mg daily  Glimepiride 4 mg daily with dinner    Clarifications to above regimen: None  Adverse Effects: None    Past diabetic medications include:  Metformin (discontinued due to kidney function)    Glucose Readings:  Glucometer/CGM Type: Dexcom G7    Current home BG readings: 145 while on the phone, fasting.   Previous home BG readings: 167 right now. Blood sugar was still good before Christmas, but ran out of Dexcom sensors until the new year. 3-day average of 147.    Any episodes of hypoglycemia? None last month.  Did patient treat episode of hypoglycemia appropriately? Yes, teaspoon of sugar  Does the patient have a prescription for ready-to-use Glucagon? Not on insulin  Does pt have proteinuria? Yes    Lifestyle:  Breakfast: Multigrain bread or a bagel with butter. Once in a while eggs or pancakes.  Lunch: white or brown rice, seafood  Dinner: some pasta, red meat occasionally, mostly chicken, peas, carrots, celery  Drinks: Water, tea, black coffee (decreased)  Physical Activity: Usually does yard work and treadmill. He has been on the treadmill 15-20 minutes at least 3 times a week.     Secondary Prevention:  Statin? Yes  ACE-I/ARB? Yes  Aspirin? Yes    Pertinent PMH Review:  PMH of Pancreatitis: No  PMH of Retinopathy: No  PMH of Urinary Tract Infections: No  PMH of MTC: " No    Immunizations:  Influenza? Yes  COVID? Yes  Pneumonia? Yes  Shingles? Yes  Hepatitis B? No    Drug Interactions  No relevant drug interactions were noted.    Medication reconciliation  Added: aspirin 81 mg  Removed: hydralazine, isosorbide dinitrate, sodium bicarbonate    Medication System Management  Patient's preferred pharmacy: [facility] and Counts include 234 beds at the [facility] for  PAP medications  Adherence/Organization: No issues  Affordability/Accessibility: Jardiance, Mounjaro, Vascepa, and Trelegy are covered by  PAP. Renewal due 9/24/25.    Objective   No Known Allergies  Social History     Social History Narrative    Not on file      Medication Review  Current Outpatient Medications   Medication Instructions    albuterol (ProAir HFA) 90 mcg/actuation inhaler 2 puffs, inhalation, Every 4 hours PRN    aspirin 81 mg, oral, Daily    atenolol (TENORMIN) 50 mg, oral, Daily    DULoxetine (CYMBALTA) 60 mg, oral, Daily    ezetimibe (ZETIA) 10 mg, oral, Daily    fenofibrate (TRIGLIDE) 160 mg, oral, Daily    fluticasone-umeclidin-vilanter (Trelegy Ellipta) 200-62.5-25 mcg blister with device 1 puff, inhalation, Daily before breakfast    Jardiance 25 mg, oral, Daily    ketoconazole (NIZOral) 2 % cream Topical, 2 times daily    montelukast (SINGULAIR) 10 mg, oral, Nightly    Mounjaro 15 mg, subcutaneous, Every 7 days    pravastatin (PRAVACHOL) 80 mg, oral, Daily    Vascepa 2 g, oral, 2 times daily      Vitals  BP Readings from Last 2 Encounters:   02/14/25 128/67   11/14/24 138/77     BMI Readings from Last 1 Encounters:   02/14/25 30.56 kg/m²      Labs  A1C  Lab Results   Component Value Date    HGBA1C 6.1 (H) 02/14/2025    HGBA1C 6.9 (H) 10/30/2024    HGBA1C 7.8 (H) 08/22/2024     BMP  Lab Results   Component Value Date    CALCIUM 9.6 02/14/2025     02/14/2025    K 4.9 02/14/2025    CO2 24 02/14/2025     02/14/2025    BUN 40 (H) 02/14/2025    CREATININE 2.36 (H) 02/14/2025    EGFR 28 (L)  02/14/2025     LFTs  Lab Results   Component Value Date    ALT 21 02/25/2023    AST 20 02/25/2023    ALKPHOS 45 02/25/2023    BILITOT 0.3 02/25/2023     FLP  Lab Results   Component Value Date    TRIG 264 (H) 02/14/2025    CHOL 146 02/14/2025    LDLF - 06/20/2023    LDLCALC 75 02/14/2025    HDL 36 (L) 02/14/2025     Urine Microalbumin  Lab Results   Component Value Date    MICROALBCREA 265 (H) 02/14/2025     Weight Management  Wt Readings from Last 3 Encounters:   02/14/25 96.6 kg (213 lb)   11/14/24 94.3 kg (208 lb)   09/03/24 95.3 kg (210 lb)      There is no height or weight on file to calculate BMI.     Assessment/Plan   Problem List Items Addressed This Visit       Diabetes mellitus (Multi)     Patient's goal A1c is < 7%.  Is pt at goal? Yes, most recent A1C was 6.1%.  Blood sugars at home continue to do well. A1C has improved further since last reading. Will discontinue glimepiride and assess blood sugars in one month. Continue Jardiance and Mounjaro as they are.    Medication Changes:  CONTINUE:  Mounjaro 15 mg weekly (Saturday)  Jardiance 25 mg daily (may still have some renal benefit if not glycemic benefit at current renal function)  DISCONTINUE:  Glimepiride 2 mg once daily         Relevant Orders    Referral to Clinical Pharmacy    Referral to Clinical Pharmacy     Clinical Pharmacist follow-up: 3/18/25, Telehealth visit    Continue all meds under the continuation of care with the referring provider and clinical pharmacy team.    Thank you,  Linh Carolina, PharmD  Clinical Pharmacist  [phone]    Verbal consent to manage patient's drug therapy was obtained from the patient. They were informed they may decline to participate or withdraw from participation in pharmacy services at any time.

## 2025-02-18 NOTE — ASSESSMENT & PLAN NOTE
Patient's goal A1c is < 7%.  Is pt at goal? Yes, most recent A1C was 6.1%.  Blood sugars at home continue to do well. A1C has improved further since last reading. Will discontinue glimepiride and assess blood sugars in one month. Continue Jardiance and Mounjaro as they are.    Medication Changes:  CONTINUE:  Mounjaro 15 mg weekly (Saturday)  Jardiance 25 mg daily (may still have some renal benefit if not glycemic benefit at current renal function)  DISCONTINUE:  Glimepiride 2 mg once daily

## 2025-02-24 DIAGNOSIS — N18.32 STAGE 3B CHRONIC KIDNEY DISEASE (MULTI): Primary | ICD-10-CM

## 2025-02-27 ENCOUNTER — OFFICE VISIT (OUTPATIENT)
Dept: OPHTHALMOLOGY | Facility: CLINIC | Age: 75
End: 2025-02-27
Payer: COMMERCIAL

## 2025-02-27 DIAGNOSIS — H25.813 COMBINED FORM OF SENILE CATARACT OF BOTH EYES: ICD-10-CM

## 2025-02-27 DIAGNOSIS — H40.013 OPEN ANGLE WITH BORDERLINE FINDINGS, LOW RISK, BILATERAL: ICD-10-CM

## 2025-02-27 DIAGNOSIS — H52.7 UNSPECIFIED DISORDER OF REFRACTION: ICD-10-CM

## 2025-02-27 DIAGNOSIS — E11.9 TYPE 2 DIABETES MELLITUS WITHOUT COMPLICATION, WITHOUT LONG-TERM CURRENT USE OF INSULIN (MULTI): Primary | ICD-10-CM

## 2025-02-27 PROCEDURE — 99214 OFFICE O/P EST MOD 30 MIN: CPT | Performed by: OPHTHALMOLOGY

## 2025-02-27 PROCEDURE — 92015 DETERMINE REFRACTIVE STATE: CPT | Mod: MUE | Performed by: OPHTHALMOLOGY

## 2025-02-27 PROCEDURE — 92133 CPTRZD OPH DX IMG PST SGM ON: CPT | Performed by: OPHTHALMOLOGY

## 2025-02-27 PROCEDURE — 92015 DETERMINE REFRACTIVE STATE: CPT | Performed by: OPHTHALMOLOGY

## 2025-02-27 RX ORDER — DICLOFENAC SODIUM 10 MG/G
GEL TOPICAL 4 TIMES DAILY
COMMUNITY

## 2025-02-27 RX ORDER — DOCUSATE SODIUM 100 MG/1
1 CAPSULE, LIQUID FILLED ORAL
COMMUNITY
Start: 2025-01-18

## 2025-02-27 ASSESSMENT — REFRACTION_MANIFEST
OD_SPHERE: PLANO
OS_SPHERE: +0.75
OD_CYLINDER: -2.25
OD_AXIS: 090
OS_CYLINDER: -2.25
METHOD_AUTOREFRACTION: 1
OD_ADD: +2.75
OS_ADD: +2.75
OS_AXIS: 105
OD_CYLINDER: -2.75
OD_AXIS: 080
OS_AXIS: 100
OS_SPHERE: +0.50
OS_CYLINDER: -2.75
OD_SPHERE: PLANO

## 2025-02-27 ASSESSMENT — REFRACTION_WEARINGRX
OS_CYLINDER: -2.50
OD_AXIS: 082
OD_SPHERE: +1.25
OD_ADD: +2.25
OS_SPHERE: +1.00
OD_CYLINDER: -1.75
OS_AXIS: 107
OS_ADD: +2.25

## 2025-02-27 ASSESSMENT — VISUAL ACUITY
OD_PH_CC: 20/60
OS_CC+: +1
OD_CC: 20/150
OS_CC: 20/30
CORRECTION_TYPE: GLASSES
METHOD: SNELLEN - SINGLE

## 2025-02-27 ASSESSMENT — KERATOMETRY
OS_AXISANGLE2_DEGREES: 95
OS_AXISANGLE_DEGREES: 5
OD_AXISANGLE_DEGREES: 15
OS_K1POWER_DIOPTERS: 44.25
OD_K2POWER_DIOPTERS: 46.00
METHOD_AUTO_MANUAL: AUTOMATED
OD_AXISANGLE2_DEGREES: 105
OD_K1POWER_DIOPTERS: 44.75
OS_K2POWER_DIOPTERS: 45.50

## 2025-02-27 ASSESSMENT — ENCOUNTER SYMPTOMS
HEMATOLOGIC/LYMPHATIC NEGATIVE: 0
CARDIOVASCULAR NEGATIVE: 0
CONSTITUTIONAL NEGATIVE: 0
PSYCHIATRIC NEGATIVE: 0
ALLERGIC/IMMUNOLOGIC NEGATIVE: 0
EYES NEGATIVE: 0
NEUROLOGICAL NEGATIVE: 0
ENDOCRINE NEGATIVE: 0
GASTROINTESTINAL NEGATIVE: 0
MUSCULOSKELETAL NEGATIVE: 0
RESPIRATORY NEGATIVE: 0

## 2025-02-27 ASSESSMENT — PACHYMETRY
OS_CT(UM): 669
OD_CT(UM): 666

## 2025-02-27 ASSESSMENT — TONOMETRY
OS_IOP_MMHG: 15
IOP_METHOD: GOLDMANN APPLANATION
OD_IOP_MMHG: 16

## 2025-02-27 ASSESSMENT — SLIT LAMP EXAM - LIDS
COMMENTS: NORMAL
COMMENTS: NORMAL

## 2025-02-27 ASSESSMENT — PAIN SCALES - GENERAL: PAINLEVEL_OUTOF10: 0-NO PAIN

## 2025-02-27 ASSESSMENT — EXTERNAL EXAM - RIGHT EYE: OD_EXAM: NORMAL

## 2025-02-27 ASSESSMENT — CUP TO DISC RATIO
OD_RATIO: 0.5
OS_RATIO: 0.5

## 2025-02-27 ASSESSMENT — EXTERNAL EXAM - LEFT EYE: OS_EXAM: NORMAL

## 2025-02-27 NOTE — PATIENT INSTRUCTIONS
Thank you so much for choosing me to provide your care today!    If you were dilated your vision may remain blurry   or light sensitive for several hours.    The nature of eye and vision problems can require frequent follow up, please make every effort to adhere to any future appointments.    If you have any issues, questions, or concerns,   please do not hesitate to reach out.    If you receive a survey in regards to your care today, please mention any exceptional care my office staff and/or technicians provided.    You can reach our office at this number:    [phone]    Please consider signing up for and utilizing [employer]!  This is the best way to directly reach me or other  providers

## 2025-02-27 NOTE — ASSESSMENT & PLAN NOTE
OCT remains stable with time, good IOP and very thick corneas. Still suspect low risk overall, will continue with annual OCT nerve.

## 2025-02-27 NOTE — ASSESSMENT & PLAN NOTE
New Rx for glasses/SCL given per patient request. Patient's signature obtained to acknowledge and confirm that a paper copy of glasses/SCL Rx was given to patient in compliance with [facility] Eyeglass Rule. Electronic copy of Rx will also be available via [employer].

## 2025-02-27 NOTE — LETTER
"February 27, 2025    Jeremías Encinas DO  [address]  [address]  [facility] [address]    Patient: Javier Messina   YOB: 1950   Date of Visit: 2/27/2025       Dear Dr. Jeremías Encinas DO:    Thank you for referring Javier Messina to me for evaluation. Here is my assessment and plan of care:    Assessment/Plan:  Derrek \"Anabelle" was seen today for annual exam.  Diagnoses and all orders for this visit:  Type 2 diabetes mellitus without complication, without long-term current use of insulin (Multi) (Primary)  Open angle with borderline findings, low risk, bilateral  -     OCT, Optic Nerve - OU - Both Eyes  Combined form of senile cataract of both eyes  Unspecified disorder of refraction    Right eye:     Left eye:    [x] No diabetes mellitus (DM) retinopathy [x] No diabetes mellitus (DM) retinopathy    [] Mild non-proliferative retinopathy [] Mild non-proliferative retinopathy    [] Moderate non-proliferative retinopathy [] Moderate non-proliferative retinopathy    [] Severe non-proliferative retinopathy [] Severe non-proliferative retinopathy    [] Proliferative retinopathy   [] Proliferative retinopathy    [] Diabetic macular edema   [] Diabetic macular edema    Urged patient to continue to work on best blood sugar and blood pressure control  Advised patient to call if any new vision changes noted    Will plan to repeat evaluation in:   [] 3 months [] 6 months [] 9 months [x] 12 months [] Other    Below you can find relevant pieces of the exam. If you have questions, please do not hesitate to call me. I look forward to following Javier along with you.         Sincerely,        Magdi Call MD        CC:   No Recipients         External Exam         Right Left    External Normal Normal              Slit Lamp Exam         Right Left    Lids/Lashes Normal Normal    Conjunctiva/Sclera White and quiet White and quiet    Cornea Clear Clear    Anterior Chamber Deep and quiet Deep and quiet    Iris Round and " "reactive Round and reactive    Lens 1+ Nuclear sclerosis, 1+ Posterior subcapsular cataract 1+ Nuclear sclerosis, 1+ Posterior subcapsular cataract              Fundus Exam         Right Left    Vitreous Normal Normal    Disc Normal Normal    C/D Ratio 0.5 0.5    Macula Normal Normal    Vessels Normal Normal    Periphery Normal Normal                   <div id=\"[email or web address]\"></div>     "

## 2025-02-27 NOTE — ASSESSMENT & PLAN NOTE
Borderline significant right eye, discussed options, not interested in surgery at this time. Will monitor with serial exam.

## 2025-02-27 NOTE — PROGRESS NOTES
Assessment/Plan   Problem List Items Addressed This Visit       Diabetes mellitus (Multi) - Primary     Advised no signs of diabetic changes on exam. Recommend to continue best sugar control and on need for annual checkup. Understands role of good BP control and weight loss if applicable. Discussed some components of selected studies like WESDR, DCCT, and UKPDS to describe the likely course of diabetes and effects on the eyes.           Open angle with borderline findings, low risk, bilateral     OCT remains stable with time, good IOP and very thick corneas. Still suspect low risk overall, will continue with annual OCT nerve.          Unspecified disorder of refraction     New Rx for glasses/SCL given per patient request. Patient's signature obtained to acknowledge and confirm that a paper copy of glasses/SCL Rx was given to patient in compliance with [facility] Eyeglass Rule. Electronic copy of Rx will also be available via [employer]IC.            Combined form of senile cataract of both eyes     Borderline significant right eye, discussed options, not interested in surgery at this time. Will monitor with serial exam.             Provided reassurance regarding above diagnoses and care received in the office visit today. Discussed outcomes and options along with the importance of treatment compliance. Understands the importance of any follow up visits. Patient instructed to call/communicate with our office if any new issues, questions, or concerns.     Will plan to see back in 1 year full OCT nerve or sooner PRN

## 2025-03-02 DIAGNOSIS — I10 BENIGN ESSENTIAL HYPERTENSION: ICD-10-CM

## 2025-03-04 ENCOUNTER — APPOINTMENT (OUTPATIENT)
Dept: UROLOGY | Facility: CLINIC | Age: 75
End: 2025-03-04
Payer: COMMERCIAL

## 2025-03-04 RX ORDER — ATENOLOL 50 MG/1
50 TABLET ORAL DAILY
Qty: 100 TABLET | Refills: 3 | Status: SHIPPED | OUTPATIENT
Start: 2025-03-04

## 2025-03-06 ENCOUNTER — ANCILLARY PROCEDURE (OUTPATIENT)
Dept: VASCULAR MEDICINE | Facility: CLINIC | Age: 75
End: 2025-03-06
Payer: COMMERCIAL

## 2025-03-06 DIAGNOSIS — I73.9 CLAUDICATION (CMS-HCC): ICD-10-CM

## 2025-03-06 DIAGNOSIS — N18.30 CKD STAGE 3 SECONDARY TO DIABETES (MULTI): ICD-10-CM

## 2025-03-06 DIAGNOSIS — E11.22 CKD STAGE 3 SECONDARY TO DIABETES (MULTI): ICD-10-CM

## 2025-03-06 PROCEDURE — 93924 LWR XTR VASC STDY BILAT: CPT

## 2025-03-06 PROCEDURE — 93924 LWR XTR VASC STDY BILAT: CPT | Performed by: INTERNAL MEDICINE

## 2025-03-10 DIAGNOSIS — R05.3 CHRONIC COUGH: ICD-10-CM

## 2025-03-10 DIAGNOSIS — G62.89 OTHER POLYNEUROPATHY: ICD-10-CM

## 2025-03-11 ENCOUNTER — PATIENT MESSAGE (OUTPATIENT)
Dept: OPHTHALMOLOGY | Facility: CLINIC | Age: 75
End: 2025-03-11
Payer: COMMERCIAL

## 2025-03-11 RX ORDER — DULOXETIN HYDROCHLORIDE 60 MG/1
60 CAPSULE, DELAYED RELEASE ORAL DAILY
Qty: 100 CAPSULE | Refills: 0 | Status: SHIPPED | OUTPATIENT
Start: 2025-03-11

## 2025-03-11 RX ORDER — MONTELUKAST SODIUM 10 MG/1
10 TABLET ORAL NIGHTLY
Qty: 100 TABLET | Refills: 0 | Status: SHIPPED | OUTPATIENT
Start: 2025-03-11

## 2025-03-18 ENCOUNTER — APPOINTMENT (OUTPATIENT)
Dept: PHARMACY | Facility: HOSPITAL | Age: 75
End: 2025-03-18
Payer: COMMERCIAL

## 2025-03-18 DIAGNOSIS — E11.9 TYPE 2 DIABETES MELLITUS WITHOUT COMPLICATION, WITHOUT LONG-TERM CURRENT USE OF INSULIN (MULTI): ICD-10-CM

## 2025-03-18 NOTE — ASSESSMENT & PLAN NOTE
Patient's goal A1c is < 7%.  Is pt at goal? Yes, most recent A1C was 6.1%.  Patient is currently between Dexcom sensors, but his 30-day data are within goal. Average glucose is consistent with readings prior to discontinuing glimepiride. Continue Jardiance and Mounjaro with no changes. Patient reports weight of 206.8 lbs.    Medication Changes:  CONTINUE:  Mounjaro 15 mg weekly (Saturday)  Jardiance 25 mg daily (may still have some renal benefit if not glycemic benefit at current renal function)

## 2025-03-18 NOTE — PROGRESS NOTES
"  Clinical Pharmacy Appointment    Patient ID: Derrek Messina \"Anabelle" is a 74 y.o. male who presents for Diabetes.    Pt is here for Follow Up appointment.     Referring Provider: Jeremías Encinas DO  PCP: Jeremías Encinas DO   Last visit with PCP: 2/14/24   Next visit with PCP: 5/14/25    Subjective   HPI  DIABETES MELLITUS TYPE 2:    Known diabetic complications: CKD.  Does patient follow with Endocrinology: No  Last optometry exam: 2/27/25  Most recent visit in Podiatry: December 2024 -- patient denies sores or cuts on feet today      Current diabetic medications include:  Mounjaro 15 mg weekly (Monday)  Jardiance 25 mg daily    Clarifications to above regimen: None  Adverse Effects: None    Past diabetic medications include:  Metformin (discontinued due to kidney function)  Glimepiride (improved glycemic control)    Glucose Readings:  Glucometer/CGM Type: Dexcom G7    Current home BG readings: 30-day GMI 6.8%, 30-day glucose average 147  Previous home BG readings: 145 while on the phone, fasting.     Any episodes of hypoglycemia? None recently.  Did patient treat episode of hypoglycemia appropriately? Yes, teaspoon of sugar  Does the patient have a prescription for ready-to-use Glucagon? Not on insulin  Does pt have proteinuria? Yes    Lifestyle:  Breakfast: Multigrain bread or a bagel with butter. Once in a while eggs or pancakes.  Lunch: white or brown rice, seafood  Dinner: some pasta, red meat occasionally, mostly chicken, peas, carrots, celery  Drinks: Water, tea, black coffee (decreased)  Physical Activity: Usually does yard work and treadmill. He has been on the treadmill 15-20 minutes at least 3 times a week.     Secondary Prevention:  Statin? Yes  ACE-I/ARB? Yes  Aspirin? Yes    Pertinent PMH Review:  PMH of Pancreatitis: No  PMH of Retinopathy: No  PMH of Urinary Tract Infections: No  PMH of MTC: No    Immunizations:  Influenza? Yes  COVID? Yes  Pneumonia? Yes  Shingles? Yes  Hepatitis B? No    Drug " Interactions  No relevant drug interactions were noted.    Medication reconciliation  Removed: docusate, Voltaren gel    Medication System Management  Patient's preferred pharmacy: [facility] and [facility] for  PAP medications  Adherence/Organization: No issues  Affordability/Accessibility: Jardiance, Mounjaro, Vascepa, and Trelegy are covered by  PAP. Renewal due 9/24/25.    Objective   No Known Allergies  Social History     Social History Narrative    Not on file      Medication Review  Current Outpatient Medications   Medication Instructions    albuterol (ProAir HFA) 90 mcg/actuation inhaler 2 puffs, inhalation, Every 4 hours PRN    aspirin 81 mg, Daily    atenolol (TENORMIN) 50 mg, oral, Daily    DULoxetine (CYMBALTA) 60 mg, oral, Daily    ezetimibe (ZETIA) 10 mg, oral, Daily    fenofibrate (TRIGLIDE) 160 mg, oral, Daily    fluticasone-umeclidin-vilanter (Trelegy Ellipta) 200-62.5-25 mcg blister with device 1 puff, inhalation, Daily before breakfast    Jardiance 25 mg, oral, Daily    ketoconazole (NIZOral) 2 % cream Topical, 2 times daily    montelukast (SINGULAIR) 10 mg, oral, Nightly    Mounjaro 15 mg, subcutaneous, Every 7 days    pravastatin (PRAVACHOL) 80 mg, oral, Daily    Vascepa 2 g, oral, 2 times daily      Vitals  BP Readings from Last 2 Encounters:   02/14/25 128/67   11/14/24 138/77     BMI Readings from Last 1 Encounters:   02/14/25 30.56 kg/m²      Labs  A1C  Lab Results   Component Value Date    HGBA1C 6.1 (H) 02/14/2025    HGBA1C 6.9 (H) 10/30/2024    HGBA1C 7.8 (H) 08/22/2024     BMP  Lab Results   Component Value Date    CALCIUM 9.6 02/14/2025     02/14/2025    K 4.9 02/14/2025    CO2 24 02/14/2025     02/14/2025    BUN 40 (H) 02/14/2025    CREATININE 2.36 (H) 02/14/2025    EGFR 28 (L) 02/14/2025     LFTs  Lab Results   Component Value Date    ALT 21 02/25/2023    AST 20 02/25/2023    ALKPHOS 45 02/25/2023    BILITOT 0.3 02/25/2023     [facility]  Lab  Results   Component Value Date    TRIG 264 (H) 02/14/2025    CHOL 146 02/14/2025    LDLF - 06/20/2023    LDLCALC 75 02/14/2025    HDL 36 (L) 02/14/2025     Urine Microalbumin  Lab Results   Component Value Date    MICROALBCREA 265 (H) 02/14/2025     Weight Management  Wt Readings from Last 3 Encounters:   02/14/25 96.6 kg (213 lb)   11/14/24 94.3 kg (208 lb)   09/03/24 95.3 kg (210 lb)      There is no height or weight on file to calculate BMI.     Assessment/Plan   Problem List Items Addressed This Visit       Diabetes mellitus (Multi)     Patient's goal A1c is < 7%.  Is pt at goal? Yes, most recent A1C was 6.1%.  Patient is currently between Dexcom sensors, but his 30-day data are within goal. Average glucose is consistent with readings prior to discontinuing glimepiride. Continue Jardiance and Mounjaro with no changes. Patient reports weight of 206.8 lbs.    Medication Changes:  CONTINUE:  Mounjaro 15 mg weekly (Saturday)  Jardiance 25 mg daily (may still have some renal benefit if not glycemic benefit at current renal function)         Relevant Orders    Referral to Clinical Pharmacy     Clinical Pharmacist follow-up: 4/15/25, Telehealth visit    Continue all meds under the continuation of care with the referring provider and clinical pharmacy team.    Thank you,  Linh Carolina, PharmD  Clinical Pharmacist  [phone]    Verbal consent to manage patient's drug therapy was obtained from the patient. They were informed they may decline to participate or withdraw from participation in pharmacy services at any time.

## 2025-04-01 DIAGNOSIS — N18.4 CHRONIC RENAL DISEASE, STAGE IV (MULTI): ICD-10-CM

## 2025-04-01 DIAGNOSIS — E11.9 TYPE 2 DIABETES MELLITUS WITHOUT COMPLICATION, WITHOUT LONG-TERM CURRENT USE OF INSULIN: ICD-10-CM

## 2025-04-15 ENCOUNTER — APPOINTMENT (OUTPATIENT)
Dept: PHARMACY | Facility: HOSPITAL | Age: 75
End: 2025-04-15
Payer: COMMERCIAL

## 2025-04-23 DIAGNOSIS — E11.9 TYPE 2 DIABETES MELLITUS WITHOUT COMPLICATION, WITHOUT LONG-TERM CURRENT USE OF INSULIN: ICD-10-CM

## 2025-04-23 RX ORDER — TIRZEPATIDE 15 MG/.5ML
15 INJECTION, SOLUTION SUBCUTANEOUS
Qty: 6 ML | Refills: 1 | Status: SHIPPED | OUTPATIENT
Start: 2025-04-23

## 2025-04-24 DIAGNOSIS — E11.9 TYPE 2 DIABETES MELLITUS WITHOUT COMPLICATION, WITHOUT LONG-TERM CURRENT USE OF INSULIN: ICD-10-CM

## 2025-04-24 RX ORDER — EZETIMIBE 10 MG/1
10 TABLET ORAL DAILY
Qty: 100 TABLET | Refills: 2 | Status: SHIPPED | OUTPATIENT
Start: 2025-04-24

## 2025-04-24 NOTE — PROGRESS NOTES
"  Clinical Pharmacy Appointment    Patient ID: Derrek Messina \"Anabelle" is a 75 y.o. male who presents for Diabetes.    Pt is here for Follow Up appointment.     Referring Provider: Jeremías Encinas DO  PCP: Jeremías Encinas DO   Last visit with PCP: 2/14/24   Next visit with PCP: 5/14/25    Subjective   HPI  DIABETES MELLITUS TYPE 2:    Known diabetic complications: CKD.  Does patient follow with Endocrinology: No  Last optometry exam: 2/27/25  Most recent visit in Podiatry: December 2024 -- patient denies sores or cuts on feet today      Current diabetic medications include:  Mounjaro 15 mg weekly (Saturday)  Jardiance 25 mg daily    Clarifications to above regimen: None  Adverse Effects: None    Past diabetic medications include:  Metformin (discontinued due to kidney function)  Glimepiride (improved glycemic control)    Glucose Readings:  Glucometer/CGM Type: Dexcom G7    Current home BG readings: 101 on the phone.   Previous home BG readings: 30-day GMI 6.8%, 30-day glucose average 147    Any episodes of hypoglycemia? None recently.  Did patient treat episode of hypoglycemia appropriately? Yes, teaspoon of sugar  Does the patient have a prescription for ready-to-use Glucagon? Not on insulin  Does pt have proteinuria? Yes    Lifestyle:  Breakfast: Multigrain bread or a bagel with butter. Once in a while eggs or pancakes.  Lunch: white or brown rice, seafood  Dinner: some pasta, red meat occasionally, mostly chicken, peas, carrots, celery  Drinks: Water, tea, black coffee (decreased)  Physical Activity: Usually does yard work and treadmill. He has been on the treadmill 15-20 minutes at least 3 times a week.     Secondary Prevention:  Statin? Yes  ACE-I/ARB? Yes  Aspirin? Yes    Pertinent PMH Review:  PMH of Pancreatitis: No  PMH of Retinopathy: No  PMH of Urinary Tract Infections: No  PMH of MTC: No    Immunizations:  Influenza? Yes  COVID? Yes  Pneumonia? Yes  Shingles? Yes  Hepatitis B? No    Drug Interactions  No " relevant drug interactions were noted.    Medication System Management  Patient's preferred pharmacy: [facility] and [facility] for  PAP medications  Adherence/Organization: No issues  Affordability/Accessibility: Jardiance, Mounjaro, Vascepa, and Trelegy are covered by  PAP. Renewal due 9/24/25.    Objective   No Known Allergies  Social History     Social History Narrative    Not on file      Medication Review  Current Outpatient Medications   Medication Instructions    albuterol (ProAir HFA) 90 mcg/actuation inhaler 2 puffs, inhalation, Every 4 hours PRN    aspirin 81 mg, Daily    atenolol (TENORMIN) 50 mg, oral, Daily    DULoxetine (CYMBALTA) 60 mg, oral, Daily    empagliflozin (JARDIANCE) 25 mg, oral, Daily    ezetimibe (ZETIA) 10 mg, oral, Daily    fenofibrate (TRIGLIDE) 160 mg, oral, Daily    fluticasone-umeclidin-vilanter (Trelegy Ellipta) 200-62.5-25 mcg blister with device 1 puff, inhalation, Daily before breakfast    ketoconazole (NIZOral) 2 % cream Topical, 2 times daily    montelukast (SINGULAIR) 10 mg, oral, Nightly    Mounjaro 15 mg, subcutaneous, Every 7 days    pravastatin (PRAVACHOL) 80 mg, oral, Daily    Vascepa 2 g, oral, 2 times daily      Vitals  BP Readings from Last 2 Encounters:   02/14/25 128/67   11/14/24 138/77     BMI Readings from Last 1 Encounters:   02/14/25 30.56 kg/m²      Labs  A1C  Lab Results   Component Value Date    HGBA1C 6.1 (H) 02/14/2025    HGBA1C 6.9 (H) 10/30/2024    HGBA1C 7.8 (H) 08/22/2024     BMP  Lab Results   Component Value Date    CALCIUM 9.6 02/14/2025     02/14/2025    K 4.9 02/14/2025    CO2 24 02/14/2025     02/14/2025    BUN 40 (H) 02/14/2025    CREATININE 2.36 (H) 02/14/2025    EGFR 28 (L) 02/14/2025     LFTs  Lab Results   Component Value Date    ALT 21 02/25/2023    AST 20 02/25/2023    ALKPHOS 45 02/25/2023    BILITOT 0.3 02/25/2023     FLP  Lab Results   Component Value Date    TRIG 264 (H) 02/14/2025    CHOL  146 02/14/2025    LDLF - 06/20/2023    LDLCALC 75 02/14/2025    HDL 36 (L) 02/14/2025     Urine Microalbumin  Lab Results   Component Value Date    MICROALBCREA 265 (H) 02/14/2025     Weight Management  Wt Readings from Last 3 Encounters:   02/14/25 96.6 kg (213 lb)   11/14/24 94.3 kg (208 lb)   09/03/24 95.3 kg (210 lb)      There is no height or weight on file to calculate BMI.     Assessment/Plan   Problem List Items Addressed This Visit       Diabetes mellitus (Multi)    Patient's goal A1c is < 7%.  Is pt at goal? Yes, most recent A1C was 6.1%.  Patient's blood sugar has been controlled, although patient reports they've been a little higher lately because he has been lax. Continue Jardiance and Mounjaro with no changes. Patient reports weight of 202.8 lbs.    Medication Changes:  CONTINUE:  Mounjaro 15 mg weekly (Saturday)  Jardiance 25 mg daily (may still have some renal benefit if not glycemic benefit at current renal function)         Relevant Orders    Referral to Clinical Pharmacy     Clinical Pharmacist follow-up: 5/27/25, Telehealth visit    Continue all meds under the continuation of care with the referring provider and clinical pharmacy team.    Thank you,  Linh Carolina, PharmD  Clinical Pharmacist  [phone]    Verbal consent to manage patient's drug therapy was obtained from the patient. They were informed they may decline to participate or withdraw from participation in pharmacy services at any time.   [FreeTextEntry4] : Luis Toscano, NP [FreeTextEntry1] : ISHA MONTILLA is status post Flexible Bronchoscopy Left VATS total decortication and she is here for a post-op visit. Surgery Date: 12/12/2020\par \par Two weeks ago she had developed a fever and developed a productive bloody with yellowish tint cough that lasted 3 days and went to urgent care where they stated pneumonia and was given azithromycin and Ceftin. Her x ray at that time was not overall impressive for pneumonia. \par \par

## 2025-04-25 ENCOUNTER — TELEMEDICINE (OUTPATIENT)
Dept: PHARMACY | Facility: HOSPITAL | Age: 75
End: 2025-04-25
Payer: COMMERCIAL

## 2025-04-25 DIAGNOSIS — E11.9 TYPE 2 DIABETES MELLITUS WITHOUT COMPLICATION, WITHOUT LONG-TERM CURRENT USE OF INSULIN: ICD-10-CM

## 2025-04-25 NOTE — ASSESSMENT & PLAN NOTE
Patient's goal A1c is < 7%.  Is pt at goal? Yes, most recent A1C was 6.1%.  Patient's blood sugar has been controlled, although patient reports they've been a little higher lately because he has been lax. Continue Jardiance and Mounjaro with no changes. Patient reports weight of 202.8 lbs.    Medication Changes:  CONTINUE:  Mounjaro 15 mg weekly (Saturday)  Jardiance 25 mg daily (may still have some renal benefit if not glycemic benefit at current renal function)

## 2025-04-28 PROCEDURE — RXMED WILLOW AMBULATORY MEDICATION CHARGE

## 2025-04-30 ENCOUNTER — PHARMACY VISIT (OUTPATIENT)
Dept: PHARMACY | Facility: CLINIC | Age: 75
End: 2025-04-30
Payer: COMMERCIAL

## 2025-05-06 DIAGNOSIS — E78.49 FAMILIAL COMBINED HYPERLIPIDEMIA: ICD-10-CM

## 2025-05-06 RX ORDER — ICOSAPENT ETHYL 1 G/1
2 CAPSULE ORAL 2 TIMES DAILY
Qty: 360 CAPSULE | Refills: 1 | Status: SHIPPED | OUTPATIENT
Start: 2025-05-06 | End: 2025-11-02

## 2025-05-12 ENCOUNTER — TELEPHONE (OUTPATIENT)
Dept: OPHTHALMOLOGY | Facility: CLINIC | Age: 75
End: 2025-05-12
Payer: COMMERCIAL

## 2025-05-12 NOTE — TELEPHONE ENCOUNTER
UNABLE TO LM FOR PT TO DISCUSS BILLING QUESTIONS. VOICEMAIL NOT SET UP.  I AM UNABLE TO SEE BILLING STATEMENTS FOR THIS PATIENT. NEED DATE PAID, AMOUNT AND DESCRIPTION FROM BILL/STATEMENT TO SEND TO THE BILLING DEPARTMENT FOR CL[facility]

## 2025-05-14 ENCOUNTER — APPOINTMENT (OUTPATIENT)
Dept: PRIMARY CARE | Facility: CLINIC | Age: 75
End: 2025-05-14
Payer: COMMERCIAL

## 2025-05-14 VITALS
DIASTOLIC BLOOD PRESSURE: 73 MMHG | SYSTOLIC BLOOD PRESSURE: 133 MMHG | WEIGHT: 206 LBS | HEART RATE: 72 BPM | BODY MASS INDEX: 29.56 KG/M2

## 2025-05-14 DIAGNOSIS — N18.32 CHRONIC KIDNEY DISEASE, STAGE 3B (MULTI): ICD-10-CM

## 2025-05-14 DIAGNOSIS — E11.9 TYPE 2 DIABETES MELLITUS WITHOUT COMPLICATION, WITHOUT LONG-TERM CURRENT USE OF INSULIN: Primary | ICD-10-CM

## 2025-05-14 DIAGNOSIS — R93.1 AGATSTON CORONARY ARTERY CALCIUM SCORE GREATER THAN 400: ICD-10-CM

## 2025-05-14 DIAGNOSIS — I10 BENIGN ESSENTIAL HYPERTENSION: ICD-10-CM

## 2025-05-14 PROBLEM — M77.41 METATARSALGIA OF RIGHT FOOT: Status: ACTIVE | Noted: 2025-05-14

## 2025-05-14 PROCEDURE — 3075F SYST BP GE 130 - 139MM HG: CPT | Performed by: INTERNAL MEDICINE

## 2025-05-14 PROCEDURE — 3078F DIAST BP <80 MM HG: CPT | Performed by: INTERNAL MEDICINE

## 2025-05-14 PROCEDURE — 1159F MED LIST DOCD IN RCRD: CPT | Performed by: INTERNAL MEDICINE

## 2025-05-14 PROCEDURE — G2211 COMPLEX E/M VISIT ADD ON: HCPCS | Performed by: INTERNAL MEDICINE

## 2025-05-14 PROCEDURE — 1160F RVW MEDS BY RX/DR IN RCRD: CPT | Performed by: INTERNAL MEDICINE

## 2025-05-14 PROCEDURE — 99214 OFFICE O/P EST MOD 30 MIN: CPT | Performed by: INTERNAL MEDICINE

## 2025-05-14 PROCEDURE — G8433 SCR FOR DEP NOT CPT DOC RSN: HCPCS | Performed by: INTERNAL MEDICINE

## 2025-05-14 PROCEDURE — 1036F TOBACCO NON-USER: CPT | Performed by: INTERNAL MEDICINE

## 2025-05-14 NOTE — PROGRESS NOTES
"Subjective   Patient ID: Derrek Messina \"Anabelle" is a 75 y.o. male who presents for Follow-up.    HPI     Patient is a 75-year-old male with past medical history of obstructive sleep apnea diabetes mellitus type 2 hypertension dyslipidemia and asymptomatic coronary artery disease chronic kidney disease stage III who presents for follow-up     Patient with obstructive sleep apnea following with ENT.  Compliant with treatment     Diabetes mellitus type 2.  Following with pharmacy.  On continuous glucose monitor.  Based on average readings his A1c is less than 6.5.   He watches what he eats.. He is compliant with medications and takes his Mounjaro. He denies increased thirst or increased urination.   No numbness or tingling of the extremities.  Now off metformin due to kidney function.  Last A1c less than 6.5.        Hypertension.  Currently well controlled on current medications denies headache changes in vision orthopnea.  Reports compliance with his medications.     Dyslipidemia with elevated triglycerides. Due for LDL recheck and reevaluation of the triglyceride levels.     Asym CAD. Following with cards.      COPD. Not smoking. Occasional SOB on exertion but overall stable. No recent exacerbation He denies any wheezing. Most recent PFTs showing mild to moderate obstruction patient is currently taking Breo and albuterol.      CKD 3 with proteinuria. Pt on lisinopril 40mg and SGLT2     B/L renal cysts with hx of nephrolithiasis. Following with nephro.  Recent ultrasound of the kidneys shows stability of the cysts    Denies illicit substances smoking or alcohol.    Review of Systems  Constitutional: No fever or chills  Cardiovascular: no chest pain, no palpitations and no syncope.   Respiratory: no cough, no shortness of breath during exertion and no shortness of breath at rest.   Gastrointestinal: no abdominal pain, no nausea and no vomiting.  Neuro: No Headache, no dizziness    Objective   /73   Pulse 72   " Wt 93.4 kg (206 lb)   PF 98 L/min   BMI 29.56 kg/m²     Physical Exam  Constitutional: Alert and in no acute distress. Well developed, well nourished  Head and Face: Head and face: Normal.    Cardiovascular: Heart rate and rhythm were normal, normal S1 and S2. No peripheral edema.   Pulmonary: No respiratory distress. Clear bilateral breath sounds.  Musculoskeletal: Gait and station: Normal. Muscle strength/tone: Normal.   Skin: Normal skin color and pigmentation, normal skin turgor, and no rash.    Psychiatric: Judgment and insight: Intact. Mood and affect: Normal.    Procedures    Lab Results   Component Value Date    WBC 6.0 02/14/2025    HGB 16.0 02/14/2025    HCT 49.7 02/14/2025     02/14/2025    CHOL 146 02/14/2025    TRIG 264 (H) 02/14/2025    HDL 36 (L) 02/14/2025    LDLDIRECT 96 09/10/2024    ALT 21 02/25/2023    AST 20 02/25/2023     02/14/2025    K 4.9 02/14/2025     02/14/2025    CREATININE 2.36 (H) 02/14/2025    BUN 40 (H) 02/14/2025    CO2 24 02/14/2025    TSH 1.72 06/10/2021    PSA 0.77 02/14/2025    HGBA1C 6.1 (H) 02/14/2025    ALBUR 259 (H) 02/25/2023       Vascular US ankle brachial index (ANJELICA) with [place] [address]             Tel [phone]       Vascular Lab Report  [facility] US ANKLE BRACHIAL INDEX (ANJELICA) WITH EXERCISE       Patient Name:      JDVALERIA Noble Physician: [id number] Lisa Stauffer MD  Study Date:        3/6/2025            Ordering           [id number]LEY BOWLING                                         Physician:  MRN/PID:           [id number]            Technologist:      Teresa Handy RVT  Accession#:        [id number]        Technologist 2:  Date of Birth/Age: 1950 / 74 years Encounter#:        [id number]  Gender:            M  Admission Status:  Outpatient          Location           [facility]                                          Performed:       Diagnosis/ICD: Peripheral vascular disease, unspecified-I73.9  CPT Codes:     44993 Ankle Brachial Index with exercise       CONCLUSIONS:  Right Lower PVR: No evidence of arterial occlusive disease in the right lower extremity at rest and with exercise. Normal digital perfusion noted. Multiphasic flow is noted in the right common femoral artery, right posterior tibial artery and right dorsalis pedis artery.  Left Lower PVR: No evidence of arterial occlusive disease in the left lower extremity at rest and with exercise. Decreased digital perfusion noted. Multiphasic flow is noted in the left common femoral artery, left posterior tibial artery and left dorsalis pedis artery.  Exercise:Exercise completed at 2 miles per hour, 12% grade for 5 minutes. Patient denied symptoms in the lower extremities during exercise.     Imaging & Doppler Findings:     RIGHT Lower PVR                Pressures Ratios  Right Posterior Tibial (Ankle) 148 mmHg  1.10  Right Dorsalis Pedis (Ankle)   151 mmHg  1.12  Right Digit (Great Toe)        84 mmHg   0.62     Right Ankle Post Exercise      184 mmHg  1.01       LEFT Lower PVR                Pressures Ratios  Left Posterior Tibial (Ankle) 169 mmHg  1.25  Left Dorsalis Pedis (Ankle)   156 mmHg  1.16  Left Digit (Great Toe)        69 mmHg   0.51     Left Ankle Post Exercise      210 mmHg  1.15                       Right     Left  Brachial Pressure 135 mmHg 134 mmHg          [id number] Lisa Stauffer MD  Electronically signed by [id number] Lisa Stauffer MD on 3/7/2025 at 1:02:29 PM       ** Final **            Assessment/Plan   Problem List Items Addressed This Visit           ICD-10-CM    Diabetes mellitus (Multi) - Primary E11.9    Relevant Orders    Hemoglobin A1C

## 2025-05-14 NOTE — ASSESSMENT & PLAN NOTE
Controlled on current medications.  Check A1c.  Adjust medications pending results.  Encouraged low carbohydrate diet.  Follow-up 3 months

## 2025-05-14 NOTE — ASSESSMENT & PLAN NOTE
Likely due to hypertension and diabetes.  Continue SGLT2 and ACE inhibitor and avoid ibuprofen.  Drink at least 1.5 L of fluid daily.

## 2025-05-14 NOTE — ASSESSMENT & PLAN NOTE
Recommend metatarsal pads and follow-up with podiatric medicine to discuss further treatment if indicated

## 2025-05-15 LAB
EST. AVERAGE GLUCOSE BLD GHB EST-MCNC: 157 MG/DL
EST. AVERAGE GLUCOSE BLD GHB EST-SCNC: 8.7 MMOL/L
HBA1C MFR BLD: 7.1 %

## 2025-05-23 DIAGNOSIS — R05.3 CHRONIC COUGH: ICD-10-CM

## 2025-05-23 DIAGNOSIS — G62.89 OTHER POLYNEUROPATHY: ICD-10-CM

## 2025-05-23 RX ORDER — MONTELUKAST SODIUM 10 MG/1
10 TABLET ORAL NIGHTLY
Qty: 100 TABLET | Refills: 0 | Status: SHIPPED | OUTPATIENT
Start: 2025-05-23

## 2025-05-23 RX ORDER — DULOXETIN HYDROCHLORIDE 60 MG/1
60 CAPSULE, DELAYED RELEASE ORAL DAILY
Qty: 100 CAPSULE | Refills: 0 | Status: SHIPPED | OUTPATIENT
Start: 2025-05-23

## 2025-05-27 ENCOUNTER — APPOINTMENT (OUTPATIENT)
Dept: PHARMACY | Facility: HOSPITAL | Age: 75
End: 2025-05-27
Payer: COMMERCIAL

## 2025-05-27 DIAGNOSIS — E11.9 TYPE 2 DIABETES MELLITUS WITHOUT COMPLICATION, WITHOUT LONG-TERM CURRENT USE OF INSULIN: ICD-10-CM

## 2025-05-27 PROCEDURE — RXMED WILLOW AMBULATORY MEDICATION CHARGE

## 2025-05-27 NOTE — ASSESSMENT & PLAN NOTE
Patient's goal A1c is < 7%.  Is pt at goal? Patient is borderline controlled at 7.1%.  Patient's A1C has crept up from 6.1% to 7.1%. His recent average glucose per Dexcom reports are better than the estimated average glucose of 157 based on A1C of 7.1%. Encouraged patient to maintain current control and likely will see improved A1C in August. Continue Jardiance and Mounjaro with no changes. Patient reports weight of 199.2 lbs - congratulated patient on breaking below 200 lbs. Follow up in one month.    Medication Changes:  CONTINUE:  Mounjaro 15 mg weekly (Saturday)  Jardiance 25 mg daily (may still have some renal benefit if not glycemic benefit at current renal function)

## 2025-05-27 NOTE — PROGRESS NOTES
"  Clinical Pharmacy Appointment    Patient ID: Derrek Messina \"Anabelle" is a 75 y.o. male who presents for Diabetes.    Pt is here for Follow Up appointment.     Referring Provider: Jeremías Encinas DO  PCP: Jeremías Encinas DO   Last visit with PCP: 5/14/24   Next visit with PCP: 8/14/25    Subjective   HPI  DIABETES MELLITUS TYPE 2:    Known diabetic complications: CKD.  Does patient follow with Endocrinology: No  Last optometry exam: 2/27/25  Most recent visit in Podiatry: December 2024 -- patient denies sores or cuts on feet today      Current diabetic medications include:  Mounjaro 15 mg weekly (Saturday)  Jardiance 25 mg daily    Clarifications to above regimen: None  Adverse Effects: None    Past diabetic medications include:  Metformin (discontinued due to kidney function)  Glimepiride (improved glycemic control)    Glucose Readings:  Glucometer/CGM Type: Dexcom G7    Current home BG readings: 160 on the phone after breakfast. 7-day glucose average 136, 14-day average 149, 30-day average 144  Previous home BG readings: 30-day GMI 6.8%, 30-day glucose average 147    Any episodes of hypoglycemia? None recently.  Did patient treat episode of hypoglycemia appropriately? Yes, teaspoon of sugar  Does the patient have a prescription for ready-to-use Glucagon? Not on insulin  Does pt have proteinuria? Yes    Lifestyle:  Breakfast: Multigrain bread or a bagel with butter. Once in a while eggs or pancakes.  Lunch: white or brown rice, seafood  Dinner: some pasta, red meat occasionally, mostly chicken, peas, carrots, celery  Drinks: Water, tea, black coffee (decreased)  Physical Activity: Usually does yard work and treadmill. He has been on the treadmill 15-20 minutes at least 3 times a week.     Secondary Prevention:  Statin? Yes  ACE-I/ARB? Yes  Aspirin? Yes    Pertinent PMH Review:  PMH of Pancreatitis: No  PMH of Retinopathy: No  PMH of Urinary Tract Infections: No  PMH of MTC: No    Immunizations:  Influenza? " Yes  COVID? Yes  Pneumonia? Yes  Shingles? Yes  Hepatitis B? No    Drug Interactions  No relevant drug interactions were noted.    Medication System Management  Patient's preferred pharmacy: [facility] and [facility] for  PAP medications  Adherence/Organization: No issues  Affordability/Accessibility: Jardiance, Mounjaro, Vascepa, and Trelegy are covered by [facility]. Renewal due 9/24/25.    Objective   No Known Allergies  Social History     Social History Narrative    Not on file      Medication Review  Current Outpatient Medications   Medication Instructions    albuterol (ProAir HFA) 90 mcg/actuation inhaler 2 puffs, inhalation, Every 4 hours PRN    aspirin 81 mg, Daily    atenolol (TENORMIN) 50 mg, oral, Daily    DULoxetine (CYMBALTA) 60 mg, oral, Daily    empagliflozin (JARDIANCE) 25 mg, oral, Daily    ezetimibe (ZETIA) 10 mg, oral, Daily    fenofibrate (TRIGLIDE) 160 mg, oral, Daily    fluticasone-umeclidin-vilanter (Trelegy Ellipta) 200-62.5-25 mcg blister with device 1 puff, inhalation, Daily before breakfast    icosapent ethyL (VASCEPA) 2 g, oral, 2 times daily    ketoconazole (NIZOral) 2 % cream Topical, 2 times daily    montelukast (SINGULAIR) 10 mg, oral, Nightly    Mounjaro 15 mg, subcutaneous, Every 7 days    pravastatin (PRAVACHOL) 80 mg, oral, Daily      Vitals  BP Readings from Last 2 Encounters:   05/14/25 133/73   02/14/25 128/67     BMI Readings from Last 1 Encounters:   05/14/25 29.56 kg/m²      Labs  A1C  Lab Results   Component Value Date    HGBA1C 7.1 (H) 05/14/2025    HGBA1C 6.1 (H) 02/14/2025    HGBA1C 6.9 (H) 10/30/2024     BMP  Lab Results   Component Value Date    CALCIUM 9.6 02/14/2025     02/14/2025    K 4.9 02/14/2025    CO2 24 02/14/2025     02/14/2025    BUN 40 (H) 02/14/2025    CREATININE 2.36 (H) 02/14/2025    EGFR 28 (L) 02/14/2025     LFTs  Lab Results   Component Value Date    ALT 21 02/25/2023    AST 20 02/25/2023    ALKPHOS 45 02/25/2023     BILITOT 0.3 02/25/2023     FLP  Lab Results   Component Value Date    TRIG 264 (H) 02/14/2025    CHOL 146 02/14/2025    LDLF - 06/20/2023    LDLCALC 75 02/14/2025    HDL 36 (L) 02/14/2025     Urine Microalbumin  Lab Results   Component Value Date    MICROALBCREA 265 (H) 02/14/2025     Weight Management  Wt Readings from Last 3 Encounters:   05/14/25 93.4 kg (206 lb)   02/14/25 96.6 kg (213 lb)   11/14/24 94.3 kg (208 lb)      There is no height or weight on file to calculate BMI.     Assessment/Plan   Problem List Items Addressed This Visit       Diabetes mellitus (Multi)    Patient's goal A1c is < 7%.  Is pt at goal? Patient is borderline controlled at 7.1%.  Patient's A1C has crept up from 6.1% to 7.1%. His recent average glucose per Dexcom reports are better than the estimated average glucose of 157 based on A1C of 7.1%. Encouraged patient to maintain current control and likely will see improved A1C in August. Continue Jardiance and Mounjaro with no changes. Patient reports weight of 199.2 lbs - congratulated patient on breaking below 200 lbs. Follow up in one month.    Medication Changes:  CONTINUE:  Mounjaro 15 mg weekly (Saturday)  Jardiance 25 mg daily (may still have some renal benefit if not glycemic benefit at current renal function)         Relevant Orders    Referral to Clinical Pharmacy     Clinical Pharmacist follow-up: 6/24/25, Telehealth visit    Continue all meds under the continuation of care with the referring provider and clinical pharmacy team.    Thank you,  Linh Carolina, PharmD  Clinical Pharmacist  [phone]    Verbal consent to manage patient's drug therapy was obtained from the patient. They were informed they may decline to participate or withdraw from participation in pharmacy services at any time.

## 2025-05-29 ENCOUNTER — PHARMACY VISIT (OUTPATIENT)
Dept: PHARMACY | Facility: CLINIC | Age: 75
End: 2025-05-29
Payer: COMMERCIAL

## 2025-06-05 DIAGNOSIS — M19.079: Primary | ICD-10-CM

## 2025-06-07 PROCEDURE — RXMED WILLOW AMBULATORY MEDICATION CHARGE

## 2025-06-10 ENCOUNTER — TELEPHONE (OUTPATIENT)
Dept: PRIMARY CARE | Facility: CLINIC | Age: 75
End: 2025-06-10
Payer: COMMERCIAL

## 2025-06-11 ENCOUNTER — PHARMACY VISIT (OUTPATIENT)
Dept: PHARMACY | Facility: CLINIC | Age: 75
End: 2025-06-11
Payer: COMMERCIAL

## 2025-06-23 NOTE — PROGRESS NOTES
"  Clinical Pharmacy Appointment    Patient ID: Derrek Messina \"Anabelle" is a 75 y.o. male who presents for Diabetes.    Pt is here for Follow Up appointment.     Referring Provider: Jeremías Encinas DO  PCP: Jeremías Encinas DO   Last visit with PCP: 5/14/24   Next visit with PCP: 8/14/25    Subjective   HPI  DIABETES MELLITUS TYPE 2:    Known diabetic complications: CKD.  Does patient follow with Endocrinology: No  Last optometry exam: 2/27/25  Most recent visit in Podiatry: December 2024 -- patient denies sores or cuts on feet today      Current diabetic medications include:  Mounjaro 15 mg weekly (Saturday)  Jardiance 25 mg daily    Clarifications to above regimen: None  Adverse Effects: None    Past diabetic medications include:  Metformin (discontinued due to kidney function)  Glimepiride (improved glycemic control)    Glucose Readings:  Glucometer/CGM Type: Dexcom G7    Current home BG readings: 152 fasting today, 3-day average 151.  Previous home BG readings: 160 on the phone after breakfast. 7-day glucose average 136, 14-day average 149, 30-day average 144    Any episodes of hypoglycemia? None recently.  Did patient treat episode of hypoglycemia appropriately? Yes, teaspoon of sugar  Does the patient have a prescription for ready-to-use Glucagon? Not on insulin  Does pt have proteinuria? Yes    Lifestyle:  Breakfast: Multigrain bread or a bagel with butter. Once in a while eggs or pancakes.  Lunch: white or brown rice, seafood  Dinner: some pasta, red meat occasionally, mostly chicken, peas, carrots, celery  Drinks: Water, tea, black coffee (decreased)  Physical Activity: Usually does yard work and treadmill. He has been on the treadmill 15-20 minutes at least 3 times a week.     Secondary Prevention:  Statin? Yes  ACE-I/ARB? Yes  Aspirin? Yes    Pertinent PMH Review:  PMH of Pancreatitis: No  PMH of Retinopathy: No  PMH of Urinary Tract Infections: No  PMH of MTC: No    Immunizations:  Influenza? Yes  COVID? " Yes  Pneumonia? Yes  Shingles? Yes  Hepatitis B? No    Drug Interactions  No relevant drug interactions were noted.    Medication System Management  Patient's preferred pharmacy: [facility] and [facility] for  PAP medications  Adherence/Organization: No issues  Affordability/Accessibility: Jardiance, Mounjaro, Vascepa, and Trelegy are covered by  [employer]. Renewal due 9/24/25.    Objective   No Known Allergies  Social History     Social History Narrative    Not on file      Medication Review  Current Outpatient Medications   Medication Instructions    albuterol (ProAir HFA) 90 mcg/actuation inhaler 2 puffs, inhalation, Every 4 hours PRN    aspirin 81 mg, Daily    atenolol (TENORMIN) 50 mg, oral, Daily    cholecalciferol (VITAMIN D3) 50 mcg, Daily    DULoxetine (CYMBALTA) 60 mg, oral, Daily    empagliflozin (JARDIANCE) 25 mg, oral, Daily    ezetimibe (ZETIA) 10 mg, oral, Daily    fenofibrate (TRIGLIDE) 160 mg, oral, Daily    fluticasone-umeclidin-vilanter (Trelegy Ellipta) 200-62.5-25 mcg blister with device 1 puff, inhalation, Daily before breakfast    ketoconazole (NIZOral) 2 % cream Topical, 2 times daily    montelukast (SINGULAIR) 10 mg, oral, Nightly    Mounjaro 15 mg, subcutaneous, Every 7 days    pravastatin (PRAVACHOL) 80 mg, oral, Daily    sodium bicarbonate 650 mg, 2 times daily    Vascepa 2 g, oral, 2 times daily      Vitals  BP Readings from Last 2 Encounters:   05/14/25 133/73   02/14/25 128/67     BMI Readings from Last 1 Encounters:   05/14/25 29.56 kg/m²      Labs  A1C  Lab Results   Component Value Date    HGBA1C 7.1 (H) 05/14/2025    HGBA1C 6.1 (H) 02/14/2025    HGBA1C 6.9 (H) 10/30/2024     BMP  Lab Results   Component Value Date    CALCIUM 9.6 02/14/2025     02/14/2025    K 4.9 02/14/2025    CO2 24 02/14/2025     02/14/2025    BUN 40 (H) 02/14/2025    CREATININE 2.36 (H) 02/14/2025    EGFR 28 (L) 02/14/2025     LFTs  Lab Results   Component Value Date    ALT  21 02/25/2023    AST 20 02/25/2023    ALKPHOS 45 02/25/2023    BILITOT 0.3 02/25/2023     FLP  Lab Results   Component Value Date    TRIG 264 (H) 02/14/2025    CHOL 146 02/14/2025    LDLF - 06/20/2023    LDLCALC 75 02/14/2025    HDL 36 (L) 02/14/2025     Urine Microalbumin  Lab Results   Component Value Date    MICROALBCREA 265 (H) 02/14/2025     Weight Management  Wt Readings from Last 3 Encounters:   05/14/25 93.4 kg (206 lb)   02/14/25 96.6 kg (213 lb)   11/14/24 94.3 kg (208 lb)      There is no height or weight on file to calculate BMI.     Assessment/Plan   Problem List Items Addressed This Visit       Diabetes mellitus (Multi)    Patient's goal A1c is < 7%.  Is pt at goal? Patient is borderline controlled at 7.1%.  Patient's A1C has crept up from 6.1% to 7.1%. His Dexcom  broke and he had issues hearing back from office, but he was able to obtain a new  from [employer]. He has only been using the sensors for 3 days since being off for 1-2 weeks. Continue Jardiance and Mounjaro with no changes. Patient reports weight of 203.4 lbs, which is up from last visit, but down from peak a few days ago. Weather has made it difficult for patient to be as active as he would like. Follow up in one month.    Medication Changes:  CONTINUE:  Mounjaro 15 mg weekly (Saturday)  Jardiance 25 mg daily (may still have some renal benefit if not glycemic benefit at current renal function)         Relevant Orders    Referral to Clinical Pharmacy     Clinical Pharmacist follow-up: 7/24/25, Telehealth visit    Continue all meds under the continuation of care with the referring provider and clinical pharmacy team.    Thank you,  Linh Carolina, PharmD  Clinical Pharmacist  [phone]    Verbal consent to manage patient's drug therapy was obtained from the patient. They were informed they may decline to participate or withdraw from participation in pharmacy services at any time.

## 2025-06-24 ENCOUNTER — APPOINTMENT (OUTPATIENT)
Dept: PHARMACY | Facility: HOSPITAL | Age: 75
End: 2025-06-24
Payer: COMMERCIAL

## 2025-06-24 DIAGNOSIS — E11.9 TYPE 2 DIABETES MELLITUS WITHOUT COMPLICATION, WITHOUT LONG-TERM CURRENT USE OF INSULIN: ICD-10-CM

## 2025-06-24 RX ORDER — ACETAMINOPHEN 500 MG
50 TABLET ORAL DAILY
COMMUNITY

## 2025-06-24 RX ORDER — SODIUM BICARBONATE 650 MG/1
650 TABLET ORAL 2 TIMES DAILY
COMMUNITY

## 2025-07-21 PROCEDURE — RXMED WILLOW AMBULATORY MEDICATION CHARGE

## 2025-07-22 ENCOUNTER — APPOINTMENT (OUTPATIENT)
Dept: PRIMARY CARE | Facility: CLINIC | Age: 75
End: 2025-07-22
Payer: COMMERCIAL

## 2025-07-22 VITALS
BODY MASS INDEX: 30.64 KG/M2 | WEIGHT: 214 LBS | DIASTOLIC BLOOD PRESSURE: 73 MMHG | HEIGHT: 70 IN | HEART RATE: 64 BPM | SYSTOLIC BLOOD PRESSURE: 131 MMHG

## 2025-07-22 DIAGNOSIS — M19.079 ARTHRITIS OF FIRST MTP JOINT: Primary | ICD-10-CM

## 2025-07-22 PROCEDURE — 3075F SYST BP GE 130 - 139MM HG: CPT | Performed by: FAMILY MEDICINE

## 2025-07-22 PROCEDURE — 20600 DRAIN/INJ JOINT/BURSA W/O US: CPT | Performed by: FAMILY MEDICINE

## 2025-07-22 PROCEDURE — 1159F MED LIST DOCD IN RCRD: CPT | Performed by: FAMILY MEDICINE

## 2025-07-22 PROCEDURE — 99213 OFFICE O/P EST LOW 20 MIN: CPT | Performed by: FAMILY MEDICINE

## 2025-07-22 PROCEDURE — 3078F DIAST BP <80 MM HG: CPT | Performed by: FAMILY MEDICINE

## 2025-07-22 RX ORDER — LIDOCAINE HYDROCHLORIDE 20 MG/ML
1 INJECTION, SOLUTION INFILTRATION; PERINEURAL
Status: COMPLETED | OUTPATIENT
Start: 2025-07-22 | End: 2025-07-22

## 2025-07-22 RX ORDER — TRIAMCINOLONE ACETONIDE 40 MG/ML
20 INJECTION, SUSPENSION INTRA-ARTICULAR; INTRAMUSCULAR
Status: COMPLETED | OUTPATIENT
Start: 2025-07-22 | End: 2025-07-22

## 2025-07-22 RX ADMIN — TRIAMCINOLONE ACETONIDE 20 MG: 40 INJECTION, SUSPENSION INTRA-ARTICULAR; INTRAMUSCULAR at 14:13

## 2025-07-22 RX ADMIN — LIDOCAINE HYDROCHLORIDE 1 ML: 20 INJECTION, SOLUTION INFILTRATION; PERINEURAL at 14:13

## 2025-07-22 NOTE — PROGRESS NOTES
"    Chief Complaint:  Establish Care (Left foot injection)  History Of Present Illness:   Derrek Wildepper \"Anabelle" is a 75 y.o. male         Left 1st toe: throbbing/pulsing.   Yesterday it was 'on the war path'.  Can keep him up at night.   Sometimes ices his right 1st toe.  Large amount of onychomycosis left 1st toe.     Left 1st toe xrays have been done.      Agreed on 1st MTP injection today      Right bottom lateral foot  - uses OTC brace.   - hasn't had xrays  - between 3rd and 4th toes- callus is present plantar aspect.   - discussed having podiatry/wife work on callus- consider chatman's neuroma injection.        Healthcare team:  - podiatry for foot fungus         Review of Systems (BOLD if positive, delete if not asked):     Constitutional:   - fever   - chills   - night sweats  - unexpected weight change        Cardiovascular:   - chest pain         Respiratory:   - shortness of breath         Neurological:   - headache  - loss of consciousness  - tremors  - dizziness         Gastrointestinal:   - abdominal pain       Genitourinary:   - urinary incontinence  - increased urinary frequency  - painful urination  - blood in urine        Endocrine:   - excessive thirst  - feeling too hot  - feeling too cold      Hematologic/Lymphatic:   - swollen glands  - blood clotting problems  - easy bruising      Sexual:   - sexual health concerns         Psychological:   - feelings of depression  - feelings of anxiety              Psychological:   - feeling generally happy  - feeling safe at home          Last Recorded Vitals:  Vitals:    07/22/25 1230   BP: 131/73   Pulse: 64   Weight: 97.1 kg (214 lb)   Height: 1.778 m (5' 10\")        Past Medical History:  He has a past medical history of Arthritis (Unknown), Atherosclerotic heart disease of native coronary artery with unspecified angina pectoris, Atherosclerotic heart disease of native coronary artery with unspecified angina pectoris, Chronic kidney disease, COPD " (chronic obstructive pulmonary disease) (Multi), GERD (gastroesophageal reflux disease), Hypertension, Neuromuscular disorder (Multi), Other specified diabetes mellitus without complications, Other specified diabetes mellitus without complications, Personal history of diseases of the blood and blood-forming organs and certain disorders involving the immune mechanism, Personal history of other diseases of the circulatory system, Personal history of other diseases of the circulatory system, Unspecified abnormal findings in urine (12/10/2014), and Urinary tract infection.     Past Surgical History:  He has a past surgical history that includes Kidney surgery (07/23/2015); Other surgical history (02/28/2022); and Kidney stone surgery (01/17/2025).     Social History:  He reports that he quit smoking about 29 years ago. His smoking use included cigarettes, pipe, and cigars. He started smoking about 57 years ago. He has a 54.5 pack-year smoking history. He has been exposed to tobacco smoke. He has never used smokeless tobacco. He reports that he does not currently use alcohol. He reports that he does not use drugs.     Family History:  Family History[1]  Allergies:  Patient has no known allergies.     Outpatient Medications:  Current Outpatient Medications   Medication Instructions    albuterol (ProAir HFA) 90 mcg/actuation inhaler 2 puffs, inhalation, Every 4 hours PRN    aspirin 81 mg, Daily    atenolol (TENORMIN) 50 mg, oral, Daily    cholecalciferol (VITAMIN D3) 50 mcg, Daily    DULoxetine (CYMBALTA) 60 mg, oral, Daily    empagliflozin (JARDIANCE) 25 mg, oral, Daily    ezetimibe (ZETIA) 10 mg, oral, Daily    fenofibrate (TRIGLIDE) 160 mg, oral, Daily    fluticasone-umeclidin-vilanter (Trelegy Ellipta) 200-62.5-25 mcg blister with device 1 puff, inhalation, Daily before breakfast    ketoconazole (NIZOral) 2 % cream Topical, 2 times daily    montelukast (SINGULAIR) 10 mg, oral, Nightly    Mounjaro 15 mg, subcutaneous,  Every 7 days    pravastatin (PRAVACHOL) 80 mg, oral, Daily    sodium bicarbonate 650 mg, 2 times daily    Vascepa 2 g, oral, 2 times daily        Physical Exam:  GENERAL: Well developed, well nourished, alert and cooperative, and appears to be in no acute distress.     PSYCH: mood pleasant and appropriate     HEAD: normocephalic     EYES: PERRL, EOMI. vision is grossly intact.     LUNGS: Good respiratory effort.       ABD: soft         GAIT: Normal            NEUROLOGICAL:   CN II-XII grossly intact.       SKIN:   Onychomycosis and callus b/l feet        MUSCULOSKELETAL:    First MTP joint on the left was minimally tender to palpation.  Good range of motion and strength.  There was onychomycosis and skin callus noted to the left first toe distally.  No erythema or cellulitis.    Left foot: Plantar aspect of the right foot in between the 3rd and 4th metatarsal head: Callus formation and some tenderness.    NA shadowing student was in the room for the entire physical examination and disposition.      Last Labs:  CBC -  Lab Results   Component Value Date    WBC 6.0 02/14/2025    HGB 16.0 02/14/2025    HCT 49.7 02/14/2025    MCV 87.2 02/14/2025     02/14/2025        CMP -  Lab Results   Component Value Date    CALCIUM 9.6 02/14/2025    PHOS 4.2 02/14/2025    PROT 7.6 02/25/2023    ALBUMIN 4.7 02/14/2025    AST 20 02/25/2023    ALT 21 02/25/2023    ALKPHOS 45 02/25/2023    BILITOT 0.3 02/25/2023        LIPID PANEL -  Lab Results   Component Value Date    CHOL 146 02/14/2025    TRIG 264 (H) 02/14/2025    HDL 36 (L) 02/14/2025    CHHDL 4.1 02/14/2025    LDLF - 06/20/2023    VLDL  09/10/2024      Comment:      Unable to calculate VLDL.    NHDL 110 02/14/2025           Lab Results   Component Value Date    HGBA1C 7.1 (H) 05/14/2025    HGBA1C 7.5 (H) 11/07/2019          Joint Injection Small/Arthrocentesis: L great MTP on 7/22/2025 2:13 PM  Indications: pain  Details: 22 G needle, dorsal approach  Medications: 20 mg  triamcinolone acetonide 40 mg/mL; 1 mL lidocaine 20 mg/mL (2 %)  Outcome: tolerated well, no immediate complications    Pt had improvement in his left 1st toe pain after the injection.    Procedure, treatment alternatives, risks and benefits explained, specific risks discussed. Consent was given by the patient. Immediately prior to procedure a time out was called to verify the correct patient, procedure, equipment, support staff and site/side marked as required. Patient was prepped and draped in the usual sterile fashion.                  Assessment/Plan   Problem List Items Addressed This Visit    None  Visit Diagnoses         Codes      Arthritis of first MTP joint    -  Primary M19.079                 Welcome to sports medicine clinic      Nice to meet you in the office today.     Left first toe was injected today with good result.  Expectant management discussed.    Right foot possible Carrasco's neuroma, work on the callus with podiatry and potentially with your wife, consider follow-up if the callus removal does not help and we could consider an injection                No work note needed.    As we agreed to today, I do not complete disability paperwork, or act as the 'physician of record' in [facility] cases.  Referrals and advanced imaging scheduling line: [phone].  Another scheduling number is: [phone].  Another potential phone number is: [phone] ([phone]).  The number for pediatric referrals is: [phone].      Follow up as needed. Continue with your PCP or new PCP.         Magdi Lennon, DO         [1]   Family History  Problem Relation Name Age of Onset    Diabetes Father Julius Messina     Hypertension Father Julius Messina     Heart disease Father Julius Messina     COPD Father Julius Wildepper     Heart disease Brother G, Stef Wildepper

## 2025-07-23 ENCOUNTER — PHARMACY VISIT (OUTPATIENT)
Dept: PHARMACY | Facility: CLINIC | Age: 75
End: 2025-07-23
Payer: COMMERCIAL

## 2025-07-24 ENCOUNTER — APPOINTMENT (OUTPATIENT)
Dept: PHARMACY | Facility: HOSPITAL | Age: 75
End: 2025-07-24
Payer: COMMERCIAL

## 2025-07-30 ENCOUNTER — TELEPHONE (OUTPATIENT)
Dept: PRIMARY CARE | Facility: CLINIC | Age: 75
End: 2025-07-30

## 2025-08-01 ENCOUNTER — TELEMEDICINE (OUTPATIENT)
Dept: PHARMACY | Facility: HOSPITAL | Age: 75
End: 2025-08-01
Payer: COMMERCIAL

## 2025-08-01 DIAGNOSIS — R05.3 CHRONIC COUGH: ICD-10-CM

## 2025-08-01 DIAGNOSIS — E11.22 TYPE 2 DIABETES MELLITUS WITH STAGE 4 CHRONIC KIDNEY DISEASE, WITHOUT LONG-TERM CURRENT USE OF INSULIN (MULTI): Primary | ICD-10-CM

## 2025-08-01 DIAGNOSIS — N18.4 TYPE 2 DIABETES MELLITUS WITH STAGE 4 CHRONIC KIDNEY DISEASE, WITHOUT LONG-TERM CURRENT USE OF INSULIN (MULTI): Primary | ICD-10-CM

## 2025-08-01 DIAGNOSIS — G62.89 OTHER POLYNEUROPATHY: ICD-10-CM

## 2025-08-01 NOTE — PROGRESS NOTES
"  Clinical Pharmacy Appointment    Patient ID: Derrek Messina \"Anabelle" is a 75 y.o. male who presents for Diabetes.    Pt is here for Follow Up appointment.     Referring Provider: Jeremaís Encinas DO  PCP: Jonathan Johnson MD   Last visit with PCP: 5/14/25  Next visit with PCP: 8/18/25    Subjective   HPI  DIABETES MELLITUS TYPE 2:    Known diabetic complications: CKD.  Does patient follow with Endocrinology: No  Last optometry exam: 2/27/25  Most recent visit in Podiatry: next appt 8/13 -- patient denies sores or cuts on feet today      Current diabetic medications include:  Mounjaro 15 mg weekly (Saturday)  Jardiance 25 mg daily    Clarifications to above regimen: None  Adverse Effects: None    Past diabetic medications include:  Metformin (discontinued due to kidney function)  Glimepiride (improved glycemic control)    Glucose Readings:  Glucometer/CGM Type: Dexcom G7    Current home BG readings: 30-day average 148; 30-day GMI 6.8%.  Previous home BG readings: 152 fasting today, 3-day average 151.    Any episodes of hypoglycemia? None recently.  Did patient treat episode of hypoglycemia appropriately? Yes, teaspoon of sugar  Does the patient have a prescription for ready-to-use Glucagon? Not on insulin  Does pt have proteinuria? Yes    Lifestyle:  Breakfast: Multigrain bread or a bagel with butter. Once in a while eggs or pancakes.  Lunch: white or brown rice, seafood  Dinner: some pasta, red meat occasionally, mostly chicken, peas, carrots, celery  Drinks: Water, tea, black coffee (decreased)  Physical Activity: Usually does yard work and treadmill. He has been on the treadmill 15-20 minutes at least 3 times a week.     Secondary Prevention:  Statin? Yes  ACE-I/ARB? Yes  Aspirin? Yes    Pertinent PMH Review:  PMH of Pancreatitis: No  PMH of Retinopathy: No  PMH of Urinary Tract Infections: No  PMH of MTC: No    Immunizations:  Influenza? Yes  COVID? Yes  Pneumonia? Yes  Shingles? Yes  Hepatitis B? No    Drug " Interactions  No relevant drug interactions were noted.    Medication System Management  Patient's preferred pharmacy: [facility] and [facility] for  PAP medications  Adherence/Organization: No issues  Affordability/Accessibility: Jardiance, Mounjaro, Vascepa, and Trelegy are covered by  PAP. Renewal in the new year.     Objective   No Known Allergies  Social History     Social History Narrative    Not on file      Medication Review  Current Outpatient Medications   Medication Instructions    albuterol (ProAir HFA) 90 mcg/actuation inhaler 2 puffs, inhalation, Every 4 hours PRN    aspirin 81 mg, Daily    atenolol (TENORMIN) 50 mg, oral, Daily    cholecalciferol (VITAMIN D3) 50 mcg, Daily    DULoxetine (CYMBALTA) 60 mg, oral, Daily    empagliflozin (JARDIANCE) 25 mg, oral, Daily    ezetimibe (ZETIA) 10 mg, oral, Daily    fenofibrate (TRIGLIDE) 160 mg, oral, Daily    fluticasone-umeclidin-vilanter (Trelegy Ellipta) 200-62.5-25 mcg blister with device 1 puff, inhalation, Daily before breakfast    ketoconazole (NIZOral) 2 % cream Topical, 2 times daily    montelukast (SINGULAIR) 10 mg, oral, Nightly    Mounjaro 15 mg, subcutaneous, Every 7 days    pravastatin (PRAVACHOL) 80 mg, oral, Daily    sodium bicarbonate 650 mg, 2 times daily    Vascepa 2 g, oral, 2 times daily      Vitals  BP Readings from Last 2 Encounters:   07/22/25 131/73   05/14/25 133/73     BMI Readings from Last 1 Encounters:   07/22/25 30.71 kg/m²      Labs  A1C  Lab Results   Component Value Date    HGBA1C 7.1 (H) 05/14/2025    HGBA1C 6.1 (H) 02/14/2025    HGBA1C 6.9 (H) 10/30/2024     BMP  Lab Results   Component Value Date    CALCIUM 9.6 02/14/2025     02/14/2025    K 4.9 02/14/2025    CO2 24 02/14/2025     02/14/2025    BUN 40 (H) 02/14/2025    CREATININE 2.36 (H) 02/14/2025    EGFR 28 (L) 02/14/2025     LFTs  Lab Results   Component Value Date    ALT 21 02/25/2023    AST 20 02/25/2023    ALKPHOS 45  02/25/2023    BILITOT 0.3 02/25/2023     FLP  Lab Results   Component Value Date    TRIG 264 (H) 02/14/2025    CHOL 146 02/14/2025    LDLF - 06/20/2023    LDLCALC 75 02/14/2025    HDL 36 (L) 02/14/2025     Urine Microalbumin  Lab Results   Component Value Date    MICROALBCREA 265 (H) 02/14/2025     Weight Management  Wt Readings from Last 3 Encounters:   07/22/25 97.1 kg (214 lb)   05/14/25 93.4 kg (206 lb)   02/14/25 96.6 kg (213 lb)      There is no height or weight on file to calculate BMI.     Assessment/Plan   Problem List Items Addressed This Visit       Diabetes mellitus (Multi) - Primary    Patient's goal A1c is < 7%.  Is pt at goal? Patient is borderline controlled at 7.1%.  Patient's blood sugars are controlled as evidenced by 30-day average and GMI from Dexcom report. Continue Jardiance and Mounjaro with no changes. Patient reports weight of 209 lbs, which is up from last visit. Weather has made it difficult for patient to be as active as he would like. Follow up in one month.    Medication Changes:  CONTINUE:  Mounjaro 15 mg weekly (Saturday)  Jardiance 25 mg daily (may still have some renal benefit if not glycemic benefit at current renal function)         Relevant Orders    Referral to Clinical Pharmacy     Clinical Pharmacist follow-up: 9/16/25, Telehealth visit    Continue all meds under the continuation of care with the referring provider and clinical pharmacy team.    Thank you,  Linh Carolina, PharmD  Clinical Pharmacist  [phone]    Verbal consent to manage patient's drug therapy was obtained from the patient. They were informed they may decline to participate or withdraw from participation in pharmacy services at any time.

## 2025-08-01 NOTE — ASSESSMENT & PLAN NOTE
Patient's goal A1c is < 7%.  Is pt at goal? Patient is borderline controlled at 7.1%.  Patient's blood sugars are controlled as evidenced by 30-day average and GMI from Dexcom report. Continue Jardiance and Mounjaro with no changes. Patient reports weight of 209 lbs, which is up from last visit. Weather has made it difficult for patient to be as active as he would like. Follow up in one month.    Medication Changes:  CONTINUE:  Mounjaro 15 mg weekly (Saturday)  Jardiance 25 mg daily (may still have some renal benefit if not glycemic benefit at current renal function)

## 2025-08-03 RX ORDER — DULOXETIN HYDROCHLORIDE 60 MG/1
60 CAPSULE, DELAYED RELEASE ORAL DAILY
Qty: 30 CAPSULE | Refills: 0 | Status: SHIPPED | OUTPATIENT
Start: 2025-08-03

## 2025-08-03 RX ORDER — MONTELUKAST SODIUM 10 MG/1
10 TABLET ORAL NIGHTLY
Qty: 30 TABLET | Refills: 0 | Status: SHIPPED | OUTPATIENT
Start: 2025-08-03

## 2025-08-07 PROCEDURE — RXMED WILLOW AMBULATORY MEDICATION CHARGE

## 2025-08-08 ENCOUNTER — PHARMACY VISIT (OUTPATIENT)
Dept: PHARMACY | Facility: CLINIC | Age: 75
End: 2025-08-08
Payer: COMMERCIAL

## 2025-08-11 ENCOUNTER — TELEPHONE (OUTPATIENT)
Dept: PRIMARY CARE | Facility: CLINIC | Age: 75
End: 2025-08-11
Payer: COMMERCIAL

## 2025-08-14 ENCOUNTER — APPOINTMENT (OUTPATIENT)
Dept: PRIMARY CARE | Facility: CLINIC | Age: 75
End: 2025-08-14
Payer: MEDICARE

## 2025-08-18 ENCOUNTER — APPOINTMENT (OUTPATIENT)
Dept: PRIMARY CARE | Facility: CLINIC | Age: 75
End: 2025-08-18
Payer: COMMERCIAL

## 2025-09-16 ENCOUNTER — APPOINTMENT (OUTPATIENT)
Dept: PHARMACY | Facility: HOSPITAL | Age: 75
End: 2025-09-16
Payer: COMMERCIAL

## 2025-11-19 ENCOUNTER — APPOINTMENT (OUTPATIENT)
Dept: PRIMARY CARE | Facility: CLINIC | Age: 75
End: 2025-11-19
Payer: COMMERCIAL

## 2026-03-03 ENCOUNTER — APPOINTMENT (OUTPATIENT)
Dept: OPHTHALMOLOGY | Facility: CLINIC | Age: 76
End: 2026-03-03
Payer: COMMERCIAL